# Patient Record
Sex: FEMALE | Race: WHITE | NOT HISPANIC OR LATINO | Employment: OTHER | URBAN - METROPOLITAN AREA
[De-identification: names, ages, dates, MRNs, and addresses within clinical notes are randomized per-mention and may not be internally consistent; named-entity substitution may affect disease eponyms.]

---

## 2017-03-01 ENCOUNTER — MEDICAL CORRESPONDENCE (OUTPATIENT)
Dept: HEALTH INFORMATION MANAGEMENT | Facility: CLINIC | Age: 77
End: 2017-03-01

## 2017-03-01 ENCOUNTER — TRANSFERRED RECORDS (OUTPATIENT)
Dept: HEALTH INFORMATION MANAGEMENT | Facility: CLINIC | Age: 77
End: 2017-03-01

## 2017-03-06 ENCOUNTER — TELEPHONE (OUTPATIENT)
Dept: GASTROENTEROLOGY | Facility: CLINIC | Age: 77
End: 2017-03-06

## 2017-03-06 NOTE — TELEPHONE ENCOUNTER
Referral received from [facility] for patient to see Dr. Saini for C Diff.  One office note from 3/1/17 received with no C Diff test result included.  Office note states that multiple positive tests have been obtained through [facility].  Call placed to [facility] department and requested tests, office notes and any abdominal imaging reports to be faxed to me at [phone].  Will watch for these records.     Carol Westholter

## 2017-03-08 NOTE — TELEPHONE ENCOUNTER
Records received from [facility].  Positive C Diff tests from 4/1/16 and 10/10/16.  Called [facility] back to request imaging reports and office/ER notes in addition.     Carol Westholter

## 2017-03-08 NOTE — TELEPHONE ENCOUNTER
Additional records received.  Contacted patient and scheduled for 5/17/17 at 3:00pm.  Also placed on waitlist.  Records placed in file in specialty clinic.     Carol Westholter

## 2017-12-21 ENCOUNTER — NURSE TRIAGE (OUTPATIENT)
Dept: NURSING | Facility: CLINIC | Age: 77
End: 2017-12-21

## 2017-12-22 NOTE — TELEPHONE ENCOUNTER
"Daughter Farheen calling concerned about Virginia's current \"fever\" and symptoms. She reports \"she started feeling bad on Sunday and had a fever on Monday of 102 F which she has had all week\" and \" she is taking acetaminophen and Nyquil, drinking lots of fluids, is eating, and is urinating regularly.\" Farheen also reports \"her temperature tonight was 103.4 F down to 102.9 F and back up to 103 F\" and asked \"she just took a cold shower and now her temperature is 102.5 F, can she take more acetaminophen?\" Virginia reports she is taking her temperature rectally due to \"drinking lots of fluids\" and denies vomiting or diarrhea. She reports her urine is light in color and is also c/o \"mild sore throat, headache, muscle aches, and mild nasal congestion\" which she reports \"all started on Sunday and have remained the same\" since then. Farheen reports Virginia \"took 650 mg of acetaminophen (in Nyquil) a couple hours ago and also took 1000 mg of acetaminophen an hour ago.\" Farheen stated \"she called her clinic and they got her an appointment tomorrow at noon\". Educated on proper home fever cares and monitoring, appropriate use of OTC fever meds, and potential causes of fever in adults. Care advice given per guideline protocol; advised Farheen to have Virginia seen within 4 hours by a provider; at this time of day, ER only option for care. Farheen and Virginia verbalized understanding of care advice given and plan on waiting until the clinic appointment tomorrow. Encouraged Farheen and Virginia to call [facility] back or seek care right away if her symptoms worsen before her scheduled clinic appointment.     Opal Parker RN  [facility]    Reason for Disposition    [1] Fever > 101 F (38.3 C) AND [2] age > 60    Additional Information    Negative: Shock suspected (e.g., cold/pale/clammy skin, too weak to stand, low BP, rapid pulse)    Negative: Difficult to awaken or acting confused  (e.g., disoriented, slurred speech)    Negative: [1] Difficulty " "breathing AND [2] bluish lips, tongue or face    Negative: New onset rash with multiple purple (or blood-colored) spots or dots    Negative: Sounds like a life-threatening emergency to the triager    Negative: Fever in a cancer patient who is currently is receiving chemotherapy or radiation therapy, or cancer patient who has metastatic or end-stage cancer and is receiving palliative care    Negative: Pregnant    Negative: Fever onset within 24 hours of receiving vaccine    Negative: [1] Fever AND [2] within 21 days of Ebola EXPOSURE    Negative: Other symptom is present, see that guideline  (e.g., symptoms of cough, runny nose, sore throat, earache, abdominal pain, diarrhea, vomiting)    Negative: [1] Headache AND [2] stiff neck (can't touch chin to chest)    Negative: Difficulty breathing    Negative: IV drug abuse    Negative: [1] Drinking very little AND [2] dehydration suspected (e.g., no urine > 12 hours, very dry mouth, very lightheaded)    Negative: Patient sounds very sick or weak to the triager  (Exception: mild weakness and hasn't taken fever medicine)    Negative: Fever > 104 F (40 C)    Answer Assessment - Initial Assessment Questions  1. TEMPERATURE: \"What is the most recent temperature?\"  \"How was it measured?\"       102.5 F rectally  2. ONSET: \"When did the fever start?\"       Monday  3. SYMPTOMS: \"Do you have any other symptoms besides the fever?\"  (e.g., colds, headache, sore throat, earache, cough, rash, diarrhea, vomiting, abdominal pain)      Mild sore throat, mild nasal congestion, muscle aches, headache  4. CAUSE: If there are no symptoms, ask: \"What do you think is causing the fever?\"       dont know  5. CONTACTS: \"Does anyone else in the family have an infection?\"      Didn't report  6. TREATMENT: \"What have you done so far to treat this fever?\" (e.g., medications)      Cold shower and OTC meds  7. IMMUNOCOMPROMISE: \"Do you have of the following: diabetes, HIV positive, splenectomy, cancer " "chemotherapy, chronic steroid treatment, transplant patient, etc.\"      denies  8. PREGNANCY: \"Is there any chance you are pregnant?\" \"When was your last menstrual period?\"      n/a  9. TRAVEL: \"Have you traveled out of the country in the last month?\" (e.g., travel history, exposures)      denies    Protocols used: FEVER-ADULT-AH    "

## 2018-01-13 ENCOUNTER — RECORDS - HEALTHEAST (OUTPATIENT)
Dept: LAB | Facility: CLINIC | Age: 78
End: 2018-01-13

## 2018-01-14 LAB — C DIFF TOX B STL QL: NEGATIVE

## 2018-01-15 ENCOUNTER — RECORDS - HEALTHEAST (OUTPATIENT)
Dept: LAB | Facility: CLINIC | Age: 78
End: 2018-01-15

## 2018-01-15 LAB
ANION GAP SERPL CALCULATED.3IONS-SCNC: 6 MMOL/L (ref 5–18)
BUN SERPL-MCNC: 20 MG/DL (ref 8–28)
CALCIUM SERPL-MCNC: 8 MG/DL (ref 8.5–10.5)
CHLORIDE BLD-SCNC: 110 MMOL/L (ref 98–107)
CO2 SERPL-SCNC: 27 MMOL/L (ref 22–31)
CREAT SERPL-MCNC: 0.77 MG/DL (ref 0.6–1.1)
ERYTHROCYTE [DISTWIDTH] IN BLOOD BY AUTOMATED COUNT: 16.4 % (ref 11–14.5)
GFR SERPL CREATININE-BSD FRML MDRD: >60 ML/MIN/1.73M2
GLUCOSE BLD-MCNC: 70 MG/DL (ref 70–125)
HCT VFR BLD AUTO: 30.9 % (ref 35–47)
HGB BLD-MCNC: 9.6 G/DL (ref 12–16)
HGB BLD-MCNC: 9.7 G/DL (ref 12–16)
MCH RBC QN AUTO: 30.6 PG (ref 27–34)
MCHC RBC AUTO-ENTMCNC: 31.1 G/DL (ref 32–36)
MCV RBC AUTO: 98 FL (ref 80–100)
PLATELET # BLD AUTO: 334 THOU/UL (ref 140–440)
PLATELET # BLD AUTO: 338 THOU/UL (ref 140–440)
PMV BLD AUTO: 10.8 FL (ref 8.5–12.5)
POTASSIUM BLD-SCNC: 4.8 MMOL/L (ref 3.5–5)
RBC # BLD AUTO: 3.14 MILL/UL (ref 3.8–5.4)
SODIUM SERPL-SCNC: 142 MMOL/L (ref 136–145)
SODIUM SERPL-SCNC: 143 MMOL/L (ref 136–145)
WBC: 7.5 THOU/UL (ref 4–11)

## 2018-06-20 ENCOUNTER — APPOINTMENT (OUTPATIENT)
Age: 78
Setting detail: DERMATOLOGY
End: 2018-06-24

## 2018-06-20 DIAGNOSIS — L40.8 OTHER PSORIASIS: ICD-10-CM

## 2018-06-20 PROBLEM — L30.9 DERMATITIS, UNSPECIFIED: Status: ACTIVE | Noted: 2018-06-20

## 2018-06-20 PROCEDURE — OTHER MIPS QUALITY: OTHER

## 2018-06-20 PROCEDURE — 11100: CPT

## 2018-06-20 PROCEDURE — OTHER PRESCRIPTION: OTHER

## 2018-06-20 PROCEDURE — 99202 OFFICE O/P NEW SF 15 MIN: CPT | Mod: 25

## 2018-06-20 PROCEDURE — OTHER COUNSELING: OTHER

## 2018-06-20 PROCEDURE — OTHER BIOPSY BY PUNCH METHOD: OTHER

## 2018-06-20 RX ORDER — TRIAMCINOLONE ACETONIDE 1 MG/G
CREAM TOPICAL BID
Qty: 80 | Refills: 1 | Status: ERX | COMMUNITY
Start: 2018-06-20

## 2018-06-20 ASSESSMENT — LOCATION DETAILED DESCRIPTION DERM: LOCATION DETAILED: LEFT RIB CAGE

## 2018-06-20 ASSESSMENT — LOCATION SIMPLE DESCRIPTION DERM: LOCATION SIMPLE: ABDOMEN

## 2018-06-20 ASSESSMENT — LOCATION ZONE DERM: LOCATION ZONE: TRUNK

## 2018-06-20 NOTE — PROCEDURE: MIPS QUALITY
Quality 431: Preventive Care And Screening: Unhealthy Alcohol Use - Screening: Patient screened for unhealthy alcohol use using a single question and scores less than 2 times per year
Quality 226: Preventive Care And Screening: Tobacco Use: Screening And Cessation Intervention: Patient screened for tobacco and never smoked
Quality 131: Pain Assessment And Follow-Up: Pain assessment documented as positive using a standardized tool AND a follow-up plan is documented
Quality 265: Biopsy Follow-Up: Biopsy results reviewed, communicated, tracked, and documented
Detail Level: Detailed
Quality 110: Preventive Care And Screening: Influenza Immunization: Influenza Immunization Administered during Influenza season
Quality 130: Documentation Of Current Medications In The Medical Record: Current Medications Documented

## 2018-06-20 NOTE — PROCEDURE: BIOPSY BY PUNCH METHOD
Bill 67350 For Specimen Handling/Conveyance To Laboratory?: no
X Size Of Lesion In Cm (Optional): 0
Billing Type: Third-Party Bill
Home Suture Removal Text: Patient was provided a home suture removal kit and will remove their sutures at home.  If they have any questions or difficulties they will call the office.
Was A Bandage Applied: Yes
Detail Level: Detailed
Epidermal Sutures: 5-0 Polysorb
Consent: Written consent was obtained and risks were reviewed including but not limited to scarring, infection, bleeding, scabbing, incomplete removal, nerve damage and allergy to anesthesia.
Hemostasis: Pressure
Punch Size In Mm: 3
Post-Care Instructions: I reviewed with the patient in detail post-care instructions. Patient is to keep the biopsy site dry overnight, and then apply H2O2 and Vaseline daily until healed.
Notification Instructions: Patient will be notified of biopsy results. However, patient instructed to call the office if not contacted within 2 weeks.
Dressing: pressure dressing with telfa
Anesthesia Type: 1% lidocaine with epinephrine and a 1:10 solution of 8.4% sodium bicarbonate
Anesthesia Volume In Cc (Will Not Render If 0): 1.5
Biopsy Type: H and E
Body Location Override (Optional - Billing Will Still Be Based On Selected Body Map Location If Applicable): left inframammary fold
Wound Care: Vaseline

## 2022-08-21 ENCOUNTER — APPOINTMENT (OUTPATIENT)
Dept: MRI IMAGING | Facility: CLINIC | Age: 82
DRG: 068 | End: 2022-08-21
Payer: COMMERCIAL

## 2022-08-21 ENCOUNTER — APPOINTMENT (OUTPATIENT)
Dept: CT IMAGING | Facility: CLINIC | Age: 82
DRG: 068 | End: 2022-08-21
Attending: EMERGENCY MEDICINE
Payer: COMMERCIAL

## 2022-08-21 ENCOUNTER — HOSPITAL ENCOUNTER (INPATIENT)
Facility: CLINIC | Age: 82
LOS: 1 days | Discharge: HOME | DRG: 068 | End: 2022-08-24
Attending: EMERGENCY MEDICINE | Admitting: HOSPITALIST
Payer: COMMERCIAL

## 2022-08-21 DIAGNOSIS — I63.9 CEREBROVASCULAR ACCIDENT (CVA), UNSPECIFIED MECHANISM (H): Primary | ICD-10-CM

## 2022-08-21 DIAGNOSIS — R29.898 WEAKNESS OF LEFT LOWER EXTREMITY: ICD-10-CM

## 2022-08-21 DIAGNOSIS — R20.0 LEFT SIDED NUMBNESS: ICD-10-CM

## 2022-08-21 DIAGNOSIS — G45.9 TIA (TRANSIENT ISCHEMIC ATTACK): ICD-10-CM

## 2022-08-21 LAB
ANION GAP SERPL CALCULATED.3IONS-SCNC: 8 MMOL/L (ref 3–14)
APTT PPP: 30 SECONDS (ref 22–38)
ATRIAL RATE - MUSE: 66 BPM
BASOPHILS # BLD AUTO: 0.1 10E3/UL (ref 0–0.2)
BASOPHILS NFR BLD AUTO: 1 %
BUN SERPL-MCNC: 37 MG/DL (ref 7–30)
CALCIUM SERPL-MCNC: 9.2 MG/DL (ref 8.5–10.1)
CHLORIDE BLD-SCNC: 108 MMOL/L (ref 94–109)
CO2 SERPL-SCNC: 25 MMOL/L (ref 20–32)
CREAT SERPL-MCNC: 0.96 MG/DL (ref 0.52–1.04)
DIASTOLIC BLOOD PRESSURE - MUSE: NORMAL MMHG
EOSINOPHIL # BLD AUTO: 0.3 10E3/UL (ref 0–0.7)
EOSINOPHIL NFR BLD AUTO: 4 %
ERYTHROCYTE [DISTWIDTH] IN BLOOD BY AUTOMATED COUNT: 13.9 % (ref 10–15)
GFR SERPL CREATININE-BSD FRML MDRD: 58 ML/MIN/1.73M2
GLUCOSE BLD-MCNC: 101 MG/DL (ref 70–99)
HCT VFR BLD AUTO: 38.6 % (ref 35–47)
HGB BLD-MCNC: 13.2 G/DL (ref 11.7–15.7)
IMM GRANULOCYTES # BLD: 0 10E3/UL
IMM GRANULOCYTES NFR BLD: 1 %
INR PPP: 1.12 (ref 0.85–1.15)
INTERPRETATION ECG - MUSE: NORMAL
LYMPHOCYTES # BLD AUTO: 1.8 10E3/UL (ref 0.8–5.3)
LYMPHOCYTES NFR BLD AUTO: 28 %
MCH RBC QN AUTO: 31.1 PG (ref 26.5–33)
MCHC RBC AUTO-ENTMCNC: 34.2 G/DL (ref 31.5–36.5)
MCV RBC AUTO: 91 FL (ref 78–100)
MONOCYTES # BLD AUTO: 0.7 10E3/UL (ref 0–1.3)
MONOCYTES NFR BLD AUTO: 10 %
NEUTROPHILS # BLD AUTO: 3.8 10E3/UL (ref 1.6–8.3)
NEUTROPHILS NFR BLD AUTO: 56 %
NRBC # BLD AUTO: 0 10E3/UL
NRBC BLD AUTO-RTO: 0 /100
P AXIS - MUSE: 71 DEGREES
PLATELET # BLD AUTO: 217 10E3/UL (ref 150–450)
POTASSIUM BLD-SCNC: 3.8 MMOL/L (ref 3.4–5.3)
PR INTERVAL - MUSE: 164 MS
QRS DURATION - MUSE: 78 MS
QT - MUSE: 414 MS
QTC - MUSE: 434 MS
R AXIS - MUSE: 55 DEGREES
RBC # BLD AUTO: 4.24 10E6/UL (ref 3.8–5.2)
SODIUM SERPL-SCNC: 141 MMOL/L (ref 133–144)
SYSTOLIC BLOOD PRESSURE - MUSE: NORMAL MMHG
T AXIS - MUSE: 55 DEGREES
TROPONIN I SERPL HS-MCNC: 4 NG/L
VENTRICULAR RATE- MUSE: 66 BPM
WBC # BLD AUTO: 6.6 10E3/UL (ref 4–11)

## 2022-08-21 PROCEDURE — 85730 THROMBOPLASTIN TIME PARTIAL: CPT

## 2022-08-21 PROCEDURE — 85025 COMPLETE CBC W/AUTO DIFF WBC: CPT

## 2022-08-21 PROCEDURE — 83036 HEMOGLOBIN GLYCOSYLATED A1C: CPT | Performed by: INTERNAL MEDICINE

## 2022-08-21 PROCEDURE — 80048 BASIC METABOLIC PNL TOTAL CA: CPT

## 2022-08-21 PROCEDURE — 70498 CT ANGIOGRAPHY NECK: CPT

## 2022-08-21 PROCEDURE — A9585 GADOBUTROL INJECTION: HCPCS | Performed by: EMERGENCY MEDICINE

## 2022-08-21 PROCEDURE — 93005 ELECTROCARDIOGRAM TRACING: CPT

## 2022-08-21 PROCEDURE — 99285 EMERGENCY DEPT VISIT HI MDM: CPT | Mod: 25

## 2022-08-21 PROCEDURE — 70553 MRI BRAIN STEM W/O & W/DYE: CPT

## 2022-08-21 PROCEDURE — 255N000002 HC RX 255 OP 636: Performed by: EMERGENCY MEDICINE

## 2022-08-21 PROCEDURE — G0378 HOSPITAL OBSERVATION PER HR: HCPCS

## 2022-08-21 PROCEDURE — 70450 CT HEAD/BRAIN W/O DYE: CPT

## 2022-08-21 PROCEDURE — 250N000011 HC RX IP 250 OP 636: Performed by: EMERGENCY MEDICINE

## 2022-08-21 PROCEDURE — 84484 ASSAY OF TROPONIN QUANT: CPT

## 2022-08-21 PROCEDURE — 85610 PROTHROMBIN TIME: CPT

## 2022-08-21 PROCEDURE — 250N000009 HC RX 250: Performed by: EMERGENCY MEDICINE

## 2022-08-21 PROCEDURE — 70496 CT ANGIOGRAPHY HEAD: CPT

## 2022-08-21 PROCEDURE — 36415 COLL VENOUS BLD VENIPUNCTURE: CPT

## 2022-08-21 PROCEDURE — 99220 PR INITIAL OBSERVATION CARE,LEVEL III: CPT | Performed by: INTERNAL MEDICINE

## 2022-08-21 RX ORDER — ATORVASTATIN CALCIUM 20 MG/1
20 TABLET, FILM COATED ORAL DAILY
COMMUNITY

## 2022-08-21 RX ORDER — ASPIRIN 81 MG/1
81 TABLET ORAL DAILY
Status: ON HOLD | COMMUNITY
End: 2022-08-24

## 2022-08-21 RX ORDER — GADOBUTROL 604.72 MG/ML
4 INJECTION INTRAVENOUS ONCE
Status: COMPLETED | OUTPATIENT
Start: 2022-08-21 | End: 2022-08-21

## 2022-08-21 RX ORDER — CHOLECALCIFEROL (VITAMIN D3) 50 MCG
1 TABLET ORAL EVERY EVENING
COMMUNITY

## 2022-08-21 RX ORDER — LEVOTHYROXINE SODIUM 50 UG/1
50 TABLET ORAL EVERY EVENING
COMMUNITY

## 2022-08-21 RX ORDER — GABAPENTIN 100 MG/1
300 CAPSULE ORAL AT BEDTIME
COMMUNITY

## 2022-08-21 RX ORDER — MULTIPLE VITAMINS W/ MINERALS TAB 9MG-400MCG
1 TAB ORAL DAILY
COMMUNITY

## 2022-08-21 RX ORDER — IOPAMIDOL 755 MG/ML
120 INJECTION, SOLUTION INTRAVASCULAR ONCE
Status: COMPLETED | OUTPATIENT
Start: 2022-08-21 | End: 2022-08-21

## 2022-08-21 RX ORDER — POLYETHYLENE GLYCOL 400 AND PROPYLENE GLYCOL 4; 3 MG/ML; MG/ML
1 SOLUTION/ DROPS OPHTHALMIC 4 TIMES DAILY PRN
COMMUNITY

## 2022-08-21 RX ADMIN — GADOBUTROL 4 ML: 604.72 INJECTION INTRAVENOUS at 21:58

## 2022-08-21 RX ADMIN — IOPAMIDOL 75 ML: 755 INJECTION, SOLUTION INTRAVENOUS at 19:42

## 2022-08-21 RX ADMIN — SODIUM CHLORIDE 80 ML: 9 INJECTION, SOLUTION INTRAVENOUS at 19:43

## 2022-08-21 ASSESSMENT — ENCOUNTER SYMPTOMS
SHORTNESS OF BREATH: 0
WEAKNESS: 1
DYSURIA: 0
NUMBNESS: 1
FACIAL ASYMMETRY: 0
ABDOMINAL PAIN: 0
ARTHRALGIAS: 1
FEVER: 0
LIGHT-HEADEDNESS: 0
NAUSEA: 0
SPEECH DIFFICULTY: 0
HEADACHES: 0
VOMITING: 0
NECK PAIN: 1
PALPITATIONS: 0

## 2022-08-21 ASSESSMENT — ACTIVITIES OF DAILY LIVING (ADL)
ADLS_ACUITY_SCORE: 35

## 2022-08-21 NOTE — ED TRIAGE NOTES
Pt reporting intermittent loss of sensation in LLE. Pt reports 1 hour ago unable to feel LLE, currently has sensation in this leg. Pt also reporting L sided shoulder and neck pain. Hx of previous stroke.      Triage Assessment     Row Name 08/21/22 3044       Triage Assessment (Adult)    Airway WDL WDL       Respiratory WDL    Respiratory WDL WDL       Skin Circulation/Temperature WDL    Skin Circulation/Temperature WDL WDL       Cardiac WDL    Cardiac WDL WDL       Peripheral/Neurovascular WDL    Peripheral Neurovascular WDL WDL       Cognitive/Neuro/Behavioral WDL    Cognitive/Neuro/Behavioral WDL WDL

## 2022-08-22 ENCOUNTER — APPOINTMENT (OUTPATIENT)
Dept: CT IMAGING | Facility: CLINIC | Age: 82
DRG: 068 | End: 2022-08-22
Payer: COMMERCIAL

## 2022-08-22 ENCOUNTER — APPOINTMENT (OUTPATIENT)
Dept: CARDIOLOGY | Facility: CLINIC | Age: 82
DRG: 068 | End: 2022-08-22
Attending: INTERNAL MEDICINE
Payer: COMMERCIAL

## 2022-08-22 PROBLEM — G45.9 TIA (TRANSIENT ISCHEMIC ATTACK): Status: ACTIVE | Noted: 2022-08-22

## 2022-08-22 PROBLEM — R25.1 EPISODE OF SHAKING: Status: ACTIVE | Noted: 2022-08-22

## 2022-08-22 LAB
ANION GAP SERPL CALCULATED.3IONS-SCNC: 5 MMOL/L (ref 3–14)
BUN SERPL-MCNC: 27 MG/DL (ref 7–30)
CALCIUM SERPL-MCNC: 8.4 MG/DL (ref 8.5–10.1)
CHLORIDE BLD-SCNC: 111 MMOL/L (ref 94–109)
CHOLEST SERPL-MCNC: 139 MG/DL
CO2 SERPL-SCNC: 24 MMOL/L (ref 20–32)
CREAT SERPL-MCNC: 0.9 MG/DL (ref 0.52–1.04)
ERYTHROCYTE [DISTWIDTH] IN BLOOD BY AUTOMATED COUNT: 14 % (ref 10–15)
FASTING STATUS PATIENT QL REPORTED: YES
GFR SERPL CREATININE-BSD FRML MDRD: 63 ML/MIN/1.73M2
GLUCOSE BLD-MCNC: 85 MG/DL (ref 70–99)
GLUCOSE BLDC GLUCOMTR-MCNC: 86 MG/DL (ref 70–99)
GLUCOSE BLDC GLUCOMTR-MCNC: 91 MG/DL (ref 70–99)
GLUCOSE BLDC GLUCOMTR-MCNC: 92 MG/DL (ref 70–99)
GLUCOSE BLDC GLUCOMTR-MCNC: 92 MG/DL (ref 70–99)
HBA1C MFR BLD: 5.3 % (ref 0–5.6)
HCT VFR BLD AUTO: 36.8 % (ref 35–47)
HDLC SERPL-MCNC: 74 MG/DL
HGB BLD-MCNC: 12.2 G/DL (ref 11.7–15.7)
LDLC SERPL CALC-MCNC: 57 MG/DL
LVEF ECHO: NORMAL
MCH RBC QN AUTO: 30.7 PG (ref 26.5–33)
MCHC RBC AUTO-ENTMCNC: 33.2 G/DL (ref 31.5–36.5)
MCV RBC AUTO: 93 FL (ref 78–100)
NONHDLC SERPL-MCNC: 65 MG/DL
PLATELET # BLD AUTO: 210 10E3/UL (ref 150–450)
POTASSIUM BLD-SCNC: 4.1 MMOL/L (ref 3.4–5.3)
RBC # BLD AUTO: 3.98 10E6/UL (ref 3.8–5.2)
SODIUM SERPL-SCNC: 140 MMOL/L (ref 133–144)
TRIGL SERPL-MCNC: 40 MG/DL
WBC # BLD AUTO: 6.1 10E3/UL (ref 4–11)

## 2022-08-22 PROCEDURE — 999N000226 HC STATISTIC SLP IP EVAL DEFER: Performed by: SPEECH-LANGUAGE PATHOLOGIST

## 2022-08-22 PROCEDURE — G0378 HOSPITAL OBSERVATION PER HR: HCPCS

## 2022-08-22 PROCEDURE — 93306 TTE W/DOPPLER COMPLETE: CPT

## 2022-08-22 PROCEDURE — 36415 COLL VENOUS BLD VENIPUNCTURE: CPT | Performed by: INTERNAL MEDICINE

## 2022-08-22 PROCEDURE — 99225 PR SUBSEQUENT OBSERVATION CARE,LEVEL II: CPT | Performed by: HOSPITALIST

## 2022-08-22 PROCEDURE — 83718 ASSAY OF LIPOPROTEIN: CPT | Performed by: INTERNAL MEDICINE

## 2022-08-22 PROCEDURE — 93306 TTE W/DOPPLER COMPLETE: CPT | Mod: 26 | Performed by: INTERNAL MEDICINE

## 2022-08-22 PROCEDURE — 250N000013 HC RX MED GY IP 250 OP 250 PS 637: Performed by: PHYSICIAN ASSISTANT

## 2022-08-22 PROCEDURE — 250N000009 HC RX 250: Performed by: HOSPITALIST

## 2022-08-22 PROCEDURE — 250N000011 HC RX IP 250 OP 636: Performed by: HOSPITALIST

## 2022-08-22 PROCEDURE — 250N000013 HC RX MED GY IP 250 OP 250 PS 637: Performed by: INTERNAL MEDICINE

## 2022-08-22 PROCEDURE — 82962 GLUCOSE BLOOD TEST: CPT

## 2022-08-22 PROCEDURE — 99223 1ST HOSP IP/OBS HIGH 75: CPT | Mod: FS | Performed by: PSYCHIATRY & NEUROLOGY

## 2022-08-22 PROCEDURE — 0042T CT HEAD PERFUSION W CONTRAST: CPT

## 2022-08-22 PROCEDURE — 85027 COMPLETE CBC AUTOMATED: CPT | Performed by: INTERNAL MEDICINE

## 2022-08-22 PROCEDURE — 80048 BASIC METABOLIC PNL TOTAL CA: CPT | Performed by: INTERNAL MEDICINE

## 2022-08-22 RX ORDER — HYDRALAZINE HYDROCHLORIDE 20 MG/ML
10-20 INJECTION INTRAMUSCULAR; INTRAVENOUS
Status: DISCONTINUED | OUTPATIENT
Start: 2022-08-22 | End: 2022-08-24 | Stop reason: HOSPADM

## 2022-08-22 RX ORDER — LEVOTHYROXINE SODIUM 50 UG/1
50 TABLET ORAL EVERY EVENING
Status: DISCONTINUED | OUTPATIENT
Start: 2022-08-22 | End: 2022-08-24 | Stop reason: HOSPADM

## 2022-08-22 RX ORDER — ASPIRIN 81 MG/1
81 TABLET ORAL EVERY EVENING
Status: DISCONTINUED | OUTPATIENT
Start: 2022-08-22 | End: 2022-08-24

## 2022-08-22 RX ORDER — ATORVASTATIN CALCIUM 20 MG/1
20 TABLET, FILM COATED ORAL DAILY
Status: DISCONTINUED | OUTPATIENT
Start: 2022-08-22 | End: 2022-08-24 | Stop reason: HOSPADM

## 2022-08-22 RX ORDER — PROCHLORPERAZINE MALEATE 5 MG
5 TABLET ORAL EVERY 6 HOURS PRN
Status: DISCONTINUED | OUTPATIENT
Start: 2022-08-22 | End: 2022-08-24 | Stop reason: HOSPADM

## 2022-08-22 RX ORDER — ACETAMINOPHEN 325 MG/1
650 TABLET ORAL EVERY 4 HOURS PRN
Status: DISCONTINUED | OUTPATIENT
Start: 2022-08-22 | End: 2022-08-24 | Stop reason: HOSPADM

## 2022-08-22 RX ORDER — LIDOCAINE 40 MG/G
CREAM TOPICAL
Status: DISCONTINUED | OUTPATIENT
Start: 2022-08-22 | End: 2022-08-24 | Stop reason: HOSPADM

## 2022-08-22 RX ORDER — LABETALOL HYDROCHLORIDE 5 MG/ML
10-20 INJECTION, SOLUTION INTRAVENOUS EVERY 10 MIN PRN
Status: DISCONTINUED | OUTPATIENT
Start: 2022-08-22 | End: 2022-08-24 | Stop reason: HOSPADM

## 2022-08-22 RX ORDER — PROCHLORPERAZINE 25 MG
12.5 SUPPOSITORY, RECTAL RECTAL EVERY 12 HOURS PRN
Status: DISCONTINUED | OUTPATIENT
Start: 2022-08-22 | End: 2022-08-24 | Stop reason: HOSPADM

## 2022-08-22 RX ORDER — ONDANSETRON 4 MG/1
4 TABLET, ORALLY DISINTEGRATING ORAL EVERY 6 HOURS PRN
Status: DISCONTINUED | OUTPATIENT
Start: 2022-08-22 | End: 2022-08-24 | Stop reason: HOSPADM

## 2022-08-22 RX ORDER — IOPAMIDOL 755 MG/ML
50 INJECTION, SOLUTION INTRAVASCULAR ONCE
Status: COMPLETED | OUTPATIENT
Start: 2022-08-22 | End: 2022-08-22

## 2022-08-22 RX ORDER — LANOLIN ALCOHOL/MO/W.PET/CERES
3 CREAM (GRAM) TOPICAL
Status: DISCONTINUED | OUTPATIENT
Start: 2022-08-22 | End: 2022-08-24 | Stop reason: HOSPADM

## 2022-08-22 RX ORDER — ONDANSETRON 2 MG/ML
4 INJECTION INTRAMUSCULAR; INTRAVENOUS EVERY 6 HOURS PRN
Status: DISCONTINUED | OUTPATIENT
Start: 2022-08-22 | End: 2022-08-24 | Stop reason: HOSPADM

## 2022-08-22 RX ORDER — GABAPENTIN 300 MG/1
300 CAPSULE ORAL AT BEDTIME
Status: DISCONTINUED | OUTPATIENT
Start: 2022-08-22 | End: 2022-08-24 | Stop reason: HOSPADM

## 2022-08-22 RX ADMIN — MELATONIN TAB 3 MG 3 MG: 3 TAB at 21:22

## 2022-08-22 RX ADMIN — APIXABAN 2.5 MG: 2.5 TABLET, FILM COATED ORAL at 20:44

## 2022-08-22 RX ADMIN — LEVOTHYROXINE SODIUM 50 MCG: 50 TABLET ORAL at 20:44

## 2022-08-22 RX ADMIN — ASPIRIN 81 MG: 81 TABLET, COATED ORAL at 20:43

## 2022-08-22 RX ADMIN — GABAPENTIN 300 MG: 300 CAPSULE ORAL at 21:22

## 2022-08-22 RX ADMIN — SODIUM CHLORIDE 100 ML: 900 INJECTION INTRAVENOUS at 21:31

## 2022-08-22 RX ADMIN — ACETAMINOPHEN 650 MG: 325 TABLET ORAL at 13:29

## 2022-08-22 RX ADMIN — FLUOXETINE 40 MG: 20 CAPSULE ORAL at 20:45

## 2022-08-22 RX ADMIN — ATORVASTATIN CALCIUM 20 MG: 20 TABLET, FILM COATED ORAL at 08:07

## 2022-08-22 RX ADMIN — IOPAMIDOL 50 ML: 755 INJECTION, SOLUTION INTRAVENOUS at 21:31

## 2022-08-22 ASSESSMENT — ACTIVITIES OF DAILY LIVING (ADL)
ADLS_ACUITY_SCORE: 36
ADLS_ACUITY_SCORE: 38
ADLS_ACUITY_SCORE: 36
ADLS_ACUITY_SCORE: 36
ADLS_ACUITY_SCORE: 33
ADLS_ACUITY_SCORE: 38
ADLS_ACUITY_SCORE: 35
ADLS_ACUITY_SCORE: 38
ADLS_ACUITY_SCORE: 36
ADLS_ACUITY_SCORE: 36
DEPENDENT_IADLS:: TRANSPORTATION
ADLS_ACUITY_SCORE: 36
ADLS_ACUITY_SCORE: 38

## 2022-08-22 NOTE — CONSULTS
"    [facility]    Stroke Telephone Note    I was called by Hang Farias on 08/21/22 regarding patient Leesa Jacinto. The patient is a 82 year old female presented with LLE weakness and numbness. Symptom started at 1630 08/21/22 when she notice LLE numbness and it buckled. On her way into hospital she also noticed LUE weakness and numbness. On ED provider exam there is L LUE weakness only. She has a hx of Afib on eliquis    Stroke Code Data (for stroke code without tele)  Stroke code activated 08/21/22 1918   Stroke provider first response  08/21/22 1919 08/21/22 1919   Last known normal 08/21/22   1630        Time of discovery   (or onset of symptoms) 08/21/22 1630   Head CT read by Stroke Neuro Dr/Provider 08/21/22 1945   Was stroke code de-escalated? Yes 08/21/22 1958          Imaging Findings   Ct head neg for acute abnormalities. Show evidence of old infarct in L PRISCA territory  CTA shows R  Distal PRISCA occlusion / stenosis. L PRISCA A2 segment shows stenosis as well.     Intravenous Thrombolysis  Not given due to:   - DOAC dose within 48 hours or INR > 1.7    Endovascular Treatment  Not initiated due to absence of proximal vessel occlusion    Impression  Acute ischemic stroke    Recommendations   - Use orderset: \"Ischemic Stroke Routine Admission\" or \"Ischemic Stroke No Thrombolytics/No Thrombectomy ICU Admission\"  - Neurochecks and Vital Signs every 4 hours   - Permissive HTN; goal SBP < 220 mmHg  - Statin: PTA statin  - MRI Brain with and without contrast  - TTE (with Bubble Study if age 60 yrs or less)  - Telemetry, EKG  - Bedside Glucose Monitoring  - A1c, Lipid Panel, Troponin x 3  - PT/OT/SLP  - Stroke Education  - Euthermia, Euglycemia   - Hold anticoagulation for now. Obtain MRI brain to determine ischemic stroke burden  - Timing of AC based on ischemic stroke burden.     My recommendations are based on the information provided over the phone by [place] " "M Ophelia's in-person providers. They are not intended to replace the clinical judgment of her in-person providers. I was not requested to personally see or examine the patient at this time.    Marie Del Cid MD  Vascular Neurology  To page me or covering stroke neurology team member, click here: AMCOM   Choose \"On Call\" tab at top, then search dropdown box for \"Neurology Adult\", select location, press Enter, then look for stroke/neuro ICU/telestroke.         "

## 2022-08-22 NOTE — PROGRESS NOTES
[facility]    Medicine Progress Note - Hospitalist Service    Date of Admission:  8/21/2022    Assessment & Plan        Ms. Jacinto is an 82-year-old female with a past medical history significant for paroxysmal atrial fibrillation, stroke, hypothyroidism, who presents to the Emergency Department with concerns for left sided arm and leg weakness.    Left-sided arm pain and leg weakness  The patient does have a history of stroke and states her symptoms previously were also on the left side and her symptoms today are very reminiscent of her stroke prior.  CT does show left frontal lobe, age indeterminate infarcts, but she does have an MRI from prior that shows left sided infarcts as well.  This would not necessarily fit with her symptoms today.  Her symptoms now have improved, though she still has some left leg weakness.  - Neurology following, awaiting final recs  - PT/OT awaited  - Await neuro official plan with Apixaban and aspirin - note is pended as of now  - A1c 5.3%  - LDL 57  - echo with normal EF, fairly unremarkable  -  Final dispo plans after neuro stroke completes eval and confirms recs, as well as therapy evaluations    Paroxysmal atrial fibrillation  She is currently in sinus rhythm.  Defer plans for anticoagulation to Neurology.    Depression  Continue with Prozac.    Idiopathic peripheral neuropathy  Continue with Neurontin.    Hypothyroidism  Continue with levothyroxine.        Diet: Combination Diet Regular Diet    DVT Prophylaxis: defer to neuro - previously on apixaban PTA  Limon Catheter: Not present  Central Lines: None  Cardiac Monitoring: ACTIVE order. Indication: Stroke, acute (48 hours)  Code Status: No CPR- Do NOT Intubate      Disposition Plan    pending therapies and neuro recs     The patient's care was discussed with the Bedside Nurse, Care Coordinator/ and Patient.    Michele Santos MD  Hospitalist Service  [facility]  Hospital  Securely message with the Vocera Web Console (learn more here)  Text page via AMCSmashChart Paging/Directory         Clinically Significant Risk Factors Present on Admission               # Coagulation Defect: home medication list includes an anticoagulant medication            ______________________________________________________________________    Interval History   Care assumed today, seen and examined midday. Continued intermittent  L arm pain, circumferential, no chest pain, no new sob.  L leg remains weak.      Data reviewed today: I reviewed all medications, new labs and imaging results over the last 24 hours. I personally reviewed no images or EKG's today.    Physical Exam   Vital Signs: Temp: 97.7  F (36.5  C) Temp src: Oral BP: 132/71 Pulse: 77   Resp: 16 SpO2: 98 % O2 Device: None (Room air)    Weight: 101 lbs 12.8 oz    Gen: NAD, pleasant  HEENT: EOMI, MMM  Resp: no crackles,  no wheezes, no increased work of resp  CV: S1S2 heard, reg rhythm, reg rate  Abdo: soft, nontender, nondistended, bowel sounds present  Ext: calves nontender, well perfused  Neuro: aa, conversing unremarkably, BUE rom and strength fairly sym, L leg weakness noted, sensory grossly intact, CN grossly intact, no facial asymmetry      Data   Recent Labs   Lab 08/22/22  1155 08/22/22  0737 08/22/22  0722 08/22/22  0200 08/21/22  1929   WBC  --   --  6.1  --  6.6   HGB  --   --  12.2  --  13.2   MCV  --   --  93  --  91   PLT  --   --  210  --  217   INR  --   --   --   --  1.12   NA  --   --  140  --  141   POTASSIUM  --   --  4.1  --  3.8   CHLORIDE  --   --  111*  --  108   CO2  --   --  24  --  25   BUN  --   --  27  --  37*   CR  --   --  0.90  --  0.96   ANIONGAP  --   --  5  --  8   DEBI  --   --  8.4*  --  9.2   GLC 92 92 85   < > 101*    < > = values in this interval not displayed.     Recent Results (from the past 24 hour(s))   CT Head w/o Contrast    Narrative    EXAM: CT HEAD W/O CONTRAST  LOCATION: [facility]  [facility]  DATE/TIME: 8/21/2022 7:47 PM    INDICATION: code stroke, confusion, altered mental status, numbness  COMPARISON: None.  TECHNIQUE: Routine CT Head without IV contrast. Multiplanar reformats. Dose reduction techniques were used.    FINDINGS:  INTRACRANIAL CONTENTS: No intracranial hemorrhage, extraaxial collection, or mass effect.  Left frontal lobe white matter demonstrates multiple small patchy age-indeterminate infarcts. Otherwise, no CT evidence of acute infarct. Mild presumed chronic   small vessel ischemic changes. Mild generalized volume loss. No hydrocephalus.     VISUALIZED ORBITS/SINUSES/MASTOIDS: No intraorbital abnormality. No paranasal sinus mucosal disease. Right mastoid cells essentially clear. Left mastoid cells mild to moderate patchy opacification.    BONES/SOFT TISSUES: No acute abnormality. Left greater then right TMJ degenerative changes. Anterior to the left maxillary alveolar ridge is a partially visualized punctate radiopaque foreign body.      Impression    IMPRESSION:  1.  No acute intracranial hemorrhage.  2.  Left frontal lobe white matter demonstrates multiple small patchy age-indeterminate infarcts.   3.  Otherwise, no CT evidence of acute infarct.   4.  Age related changes described above.  5.  Anterior to the left maxillary alveolar ridge is a partially visualized punctate radiopaque foreign body.      Dr Hang Farias was notified by Dr Tor Ramírez at  8:40 PM 08/21/2022.    There was a delay in notifying radiology regarding the code stroke.   CTA Head Neck w Contrast    Narrative    EXAM: CTA HEAD NECK W CONTRAST  LOCATION: [facility]  DATE/TIME: 8/21/2022 7:48 PM    INDICATION: Altered mental status/confusion. Possible stroke. Numbness.  COMPARISON: None.  CONTRAST: 75mL Isovue 370  TECHNIQUE: Head and neck CT angiogram with IV contrast. Axial helical CT images of the head and neck vessels obtained during the arterial phase of  intravenous contrast administration. Axial 2D reconstructed images and multiplanar 3D MIP reconstructed   images of the head and neck vessels were performed by the technologist. Dose reduction techniques were used. All stenosis measurements made according to NASCET criteria unless otherwise specified.    FINDINGS:   HEAD CTA:  Bilateral carotid siphons demonstrate mild stenoses secondary to calcified plaque.    Right M2 segments demonstrate multiple stenoses ranging from mild to severe.    Bilateral A2/A3 segments with history multiple stenoses ranging from mild to severe.    Left M2 segments demonstrate multiple stenoses ranging from mild to severe.    No additional significant stenosis/occlusion.      Right supraclinoid ICA demonstrates a small 2 mm inferior outpouching may reflect infundibulum with poorly visualized apical artery or small aneurysm.     Otherwise, no brain aneurysm. No AVM/AVF.    Standard [place] of [place] anatomy.     Dominant right and smaller left vertebral artery contribute to a normal basilar artery.     DURAL VENOUS SINUSES: Not well evaluated on a technical basis.      NECK CTA:  RIGHT CAROTID: No significant stenosis/occlusion. No dissection.    LEFT CAROTID: No significant stenosis/occlusion. No dissection.    VERTEBRAL ARTERIES:  No significant stenosis/occlusion. No dissection.      Balanced vertebral arteries.    AORTIC ARCH: Classic aortic arch anatomy with no significant stenosis at the origin of the great vessels.    ARTERIAL PLAQUE: Calcified/noncalcified plaque aorta, left subclavian artery, bilateral carotid bifurcations/bulbs, bilateral carotid siphons, bilateral V4 segments.    NONVASCULAR STRUCTURES:   Scattered foci of air within the venous system nonspecific likely iatrogenic. Thyroid nodules largest measuring 0.8 cm. Diffuse bony demineralization. Thoracic spine noted multiple mild compression deformities. Degenerative changes noted within the   spine.        Impression     IMPRESSION:   CTA BRAIN:  1.  No intracranial arterial large vessel occlusion.    2.  Bilateral carotid siphons demonstrate mild stenoses secondary to calcified plaque.    3.  Bilateral M2 segments demonstrate multiple stenoses ranging from mild to severe.    4.  Bilateral A2/A3 segments with history multiple stenoses ranging from mild to severe.    5.  Right supraclinoid ICA demonstrates a small 2 mm inferior outpouching may reflect infundibulum with poorly visualized apical artery or small aneurysm.     CTA NECK:  No significant stenosis, occlusion, dissection.      Dr Hang Farias was notified by Dr Tor Ramírez at  9:00 PM 08/21/2022.   MR Brain w/o & w Contrast    Narrative    EXAM: MR BRAIN W/O and W CONTRAST  LOCATION: [facility]  DATE/TIME: 8/21/2022 10:59 PM    INDICATION: Stroke, negative CT, left leg and arm weakness.  COMPARISON: CT same day.  CONTRAST: 4 mL Gadavist   TECHNIQUE: Routine multiplanar multisequence head MRI without and with intravenous contrast.    FINDINGS:  INTRACRANIAL CONTENTS: No acute or subacute infarct. No mass, acute hemorrhage, or extra-axial fluid collections. Patchy nonspecific T2/FLAIR hyperintensities within the cerebral white matter most consistent with mild to moderate chronic microvascular   ischemic change. Mild to moderate generalized cerebral atrophy. No hydrocephalus. Multiple chronic small white matter infarcts in the left frontal lobe. No pathologic contrast enhancement.    SELLA: No abnormality accounting for technique.    OSSEOUS STRUCTURES/SOFT TISSUES: 10 mm sclerotic focus within the C2 vertebral body. Remainder of the bone marrow pattern is normal. The major intracranial vascular flow voids are maintained.     ORBITS: No abnormality accounting for technique.     SINUSES/MASTOIDS: No paranasal sinus mucosal disease. Scattered fluid/membrane thickening in the left mastoid air cells. No apparent mass in the posterior nasopharynx or  skull base.       Impression    IMPRESSION:  1.  No recent infarct, intracranial mass, abnormal enhancement or evidence of intracranial hemorrhage.  2.  Mild to moderate presumed chronic small vessel ischemic changes and generalized volume loss.  3.  Small chronic white matter infarcts in the left frontal lobe.  4.  Indeterminate 10 mm sclerotic focus within the C2 vertebral body.   Echocardiogram Complete - For age > 60 yrs   Result Value    LVEF  55%    Narrative    [id number]  [id number]  [id number]  [id number]^MIQUEL^AMOS     [facility]  Echocardiography Laboratory  [facility] [id number]     Name: PILAR MANDEL  MRN: [id number]  : 1940  Study Date: 2022 10:28 AM  Age: 82 yrs  Gender: Female  Patient Location: [facility]  Reason For Study: Cerebrovascular Incident  Ordering Physician: AMOS LAFLEUR  Referring Physician: AMOS LAFLEUR  Performed By: Micah Lamb RDCS     BSA: 1.3 m2  Height: 56 in  Weight: 101 lb  HR: 77  BP: 138/56 mmHg  ______________________________________________________________________________  Procedure  Complete Portable Echo Adult.  ______________________________________________________________________________  Interpretation Summary     1. The left ventricle is normal in structure, function and size. The visual  ejection fraction is estimated at 55%.  2. The right ventricle is normal in structure, function and size.  3. No valve disease.     No previous echo for comparison.  ______________________________________________________________________________  Left Ventricle  The left ventricle is normal in structure, function and size. There is normal  left ventricular wall thickness. The visual ejection fraction is estimated at  55%. Grade I or early diastolic dysfunction. Normal left ventricular wall  motion.     Right Ventricle  The right ventricle is normal in structure, function and size.     Atria  The left atrium is mildly dilated.  Right atrial size is normal. There is no  atrial shunt seen.     Mitral Valve  The mitral valve is normal in structure and function.     Tricuspid Valve  There is mild (1+) tricuspid regurgitation. The right ventricular systolic  pressure is approximated at 28.9 mmHg plus the right atrial pressure.     Aortic Valve  There is mild trileaflet aortic sclerosis.     Pulmonic Valve  The pulmonic valve is normal in structure and function.     Vessels  Normal ascending, transverse (arch), and descending aorta. The inferior vena  cava was normal in size with preserved respiratory variability.     Pericardium  There is no pericardial effusion.     Rhythm  Sinus rhythm was noted.  ______________________________________________________________________________  MMode/2D Measurements & Calculations  IVSd: 1.00 cm     LVIDd: 4.1 cm  LVIDs: 2.9 cm  LVPWd: 0.92 cm  FS: 29.9 %  LV mass(C)d: 124.3 grams  LV mass(C)dI: 93.6 grams/m2  Ao root diam: 3.0 cm  LA dimension: 3.1 cm  LA/Ao: 1.0  LVOT diam: 1.8 cm  LVOT area: 2.5 cm2  LA Volume (BP): 57.0 ml  LA Volume Index (BP): 42.9 ml/m2  RWT: 0.45     Doppler Measurements & Calculations  MV E max leeanne: 64.5 cm/sec  MV A max leeanne: 46.8 cm/sec  MV E/A: 1.4  MV dec time: 0.23 sec  PA acc time: 0.11 sec  TR max leeanne: 268.8 cm/sec  TR max P.9 mmHg  E/E' avg: 10.3  Lateral E/e': 8.6  Medial E/e': 12.1     ______________________________________________________________________________  Report approved by: Mini Ortiz 2022 02:26 PM

## 2022-08-22 NOTE — PROGRESS NOTES
PRIMARY DIAGNOSIS: SYNCOPE/TIA  OUTPATIENT/OBSERVATION GOALS TO BE MET BEFORE DISCHARGE:  1. Orthostatic performed: N/A    2. Diagnostic testing complete & at baseline neurologic testing: No    3. Cleared by consultants (if involved): No    4. Interpretation of cardiac rhythm per telemetry tech: NSR    5. Tolerating adequate PO diet and medications: Yes    6. Return to near baseline physical activity or neurologic status: No    Discharge Planner Nurse   Safe discharge environment identified: No  Barriers to discharge: Yes       Entered by: Liza Veras RN 08/22/2022 9:38 AM     Please review provider order for any additional goals.   Nurse to notify provider when observation goals have been met and patient is ready for discharge.

## 2022-08-22 NOTE — PLAN OF CARE
"Orientation/Cognitive: A&Ox4  Observation Goals (Met/ Not Met): Not Met  Mobility Level/Assist Equipment: Assist of 1 w/ gait belt and walker  Fall Risk (Y/N): Y  Behavior Concerns: none  Pain Management: C/o pain in L arm similar to yesterday, given tylenol, resolved  Tele/VS/O2: VSS, NSR on tele  ABNL Lab/BG: WDL  Diet: tolerating a regular diet, passed dysphagia screen  Bowel/Bladder: WDL, small BM today  Skin Concerns: none  Drains/Devices: PIV SL  Tests/Procedures for next shift: tx to [address] for 24 hr EEG, CT scan  Anticipated DC date & active delays: 1-2 days  Patient Stated Goal for Today: get some rest    /63 (BP Location: Right arm)   Pulse 70   Temp 98.1  F (36.7  C) (Oral)   Resp 16   Ht 1.422 m (4' 8\")   Wt 46.2 kg (101 lb 12.8 oz)   SpO2 94%   BMI 22.82 kg/m          "

## 2022-08-22 NOTE — PROGRESS NOTES
PRIMARY DIAGNOSIS: SYNCOPE/TIA  OUTPATIENT/OBSERVATION GOALS TO BE MET BEFORE DISCHARGE:  1. Orthostatic performed: No    2. Diagnostic testing complete & at baseline neurologic testing: No    3. Cleared by consultants (if involved): No    4. Interpretation of cardiac rhythm per telemetry tech: NSR    5. Tolerating adequate PO diet and medications: Yes    6. Return to near baseline physical activity or neurologic status: Yes    Discharge Planner Nurse   Safe discharge environment identified: No  Barriers to discharge: Yes       Entered by: Liza Veras RN 08/22/2022 4:06 PM     Please review provider order for any additional goals.   Nurse to notify provider when observation goals have been met and patient is ready for discharge.

## 2022-08-22 NOTE — PROGRESS NOTES
PRIMARY DIAGNOSIS: SYNCOPE/TIA  OUTPATIENT/OBSERVATION GOALS TO BE MET BEFORE DISCHARGE:  1. Orthostatic performed: N/A    2. Diagnostic testing complete & at baseline neurologic testing: No    3. Cleared by consultants (if involved): No    4. Interpretation of cardiac rhythm per telemetry tech: NSR    5. Tolerating adequate PO diet and medications: Yes    6. Return to near baseline physical activity or neurologic status: Yes    Discharge Planner Nurse   Safe discharge environment identified: No  Barriers to discharge: Yes       Entered by: Liza Veras RN 08/22/2022 2:06 PM     Please review provider order for any additional goals.   Nurse to notify provider when observation goals have been met and patient is ready for discharge.

## 2022-08-22 NOTE — PHARMACY-ADMISSION MEDICATION HISTORY
Pharmacy Medication History  Admission medication history interview status for the 8/21/2022  admission is complete. See EPIC admission navigator for prior to admission medications     Location of Interview: Patient room  Medication history sources: Patient    Significant changes made to the medication list:  All medications were added    In the past week, patient estimated taking medication this percent of the time: greater than 90%    Additional medication history information:   [place] had all of her evening medications today.  She took them prior to leaving for the hospital.    Medication reconciliation completed by provider prior to medication history? No    Time spent in this activity: 20 minutes    Prior to Admission medications    Medication Sig Last Dose Taking? Auth Provider Long Term End Date   apixaban ANTICOAGULANT (ELIQUIS) 2.5 MG tablet Take 2.5 mg by mouth 2 times daily 8/21/2022 at pm Yes Unknown, Entered By History     aspirin 81 MG EC tablet Take 81 mg by mouth daily 8/21/2022 at pm Yes Unknown, Entered By History     atorvastatin (LIPITOR) 20 MG tablet Take 20 mg by mouth daily 8/21/2022 at am Yes Unknown, Entered By History Yes    calcium carbonate 600 mg-vitamin D 400 units (CALTRATE) 600-400 MG-UNIT per tablet Take 1 tablet by mouth 2 times daily 8/21/2022 at pm Yes Unknown, Entered By History     FLUoxetine (PROZAC) 20 MG capsule Take 40 mg by mouth every evening 8/21/2022 at pm Yes Unknown, Entered By History Yes    gabapentin (NEURONTIN) 100 MG capsule Take 300 mg by mouth At Bedtime 8/21/2022 at pm Yes Unknown, Entered By History Yes    levothyroxine (SYNTHROID/LEVOTHROID) 50 MCG tablet Take 50 mcg by mouth every evening 8/21/2022 at pm Yes Unknown, Entered By History Yes    multivitamin w/minerals (THERA-VIT-M) tablet Take 1 tablet by mouth daily 8/21/2022 at Unknown time Yes Unknown, Entered By History     polyethylene glycol-propylene glycol PF (SYSTANE ULTRA PF) 0.4-0.3 % SOLN  opthalmic solution Place 1 drop into both eyes 4 times daily as needed for dry eyes  at prn Yes Unknown, Entered By History     vitamin D3 (CHOLECALCIFEROL) 50 mcg (2000 units) tablet Take 1 tablet by mouth every evening 8/21/2022 at pm Yes Unknown, Entered By History         The information provided in this note is only as accurate as the sources available at the time of update(s)

## 2022-08-22 NOTE — ED NOTES
Ed   [facility]  ED Nurse Handoff Report    ED Chief complaint: Numbness      ED Diagnosis: numbness weakness, R/o stroke  Final diagnoses:   Weakness of left lower extremity   Left sided numbness       Code Status: discuss with admit md    Allergies:   Allergies   Allergen Reactions     Sodium Hypochlorite Shortness Of Breath     Alendronic Acid Diarrhea     Raloxifene Other (See Comments)     RIND on 11/8/2019     Sulfa Drugs Rash     Sulfasalazine Rash       Patient Story: Pt has Pt having new L weakness On L arm L leg.  uneven. Pt speech clear. DONI 3. No drift, no droop. GCS 15. Denies Cp no blurred vision, no dizziness. 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  Focused Assessment:  GCS 15 no headache dizziness or blurred vision. Doni L arm L leg weaker. No drift/ droop. Speech clear.  NO cp on eloquis for afib.  abdo soft non tender no NVD. Has digestive issues for 30 years and gets gas pain if npo.  Walker to ambulate. Independent.    Treatments and/or interventions provided: cta neg stroke, at MRI  Results for orders placed or performed during the hospital encounter of 08/21/22   CTA Head Neck w Contrast     Status: None    Narrative    EXAM: CTA HEAD NECK W CONTRAST  LOCATION: [facility]  DATE/TIME: 8/21/2022 7:48 PM    INDICATION: Altered mental status/confusion. Possible stroke. Numbness.  COMPARISON: None.  CONTRAST: 75mL Isovue 370  TECHNIQUE: Head and neck CT angiogram with IV contrast. Axial helical CT images of the head and neck vessels obtained during the arterial phase of intravenous contrast administration. Axial 2D reconstructed images and multiplanar 3D MIP reconstructed   images of the head and neck vessels were performed by the technologist. Dose reduction techniques were used. All stenosis measurements made according to NASCET criteria unless otherwise specified.    FINDINGS:   HEAD CTA:  Bilateral carotid siphons demonstrate mild  stenoses secondary to calcified plaque.    Right M2 segments demonstrate multiple stenoses ranging from mild to severe.    Bilateral A2/A3 segments with history multiple stenoses ranging from mild to severe.    Left M2 segments demonstrate multiple stenoses ranging from mild to severe.    No additional significant stenosis/occlusion.      Right supraclinoid ICA demonstrates a small 2 mm inferior outpouching may reflect infundibulum with poorly visualized apical artery or small aneurysm.     Otherwise, no brain aneurysm. No AVM/AVF.    Standard [place] anatomy.     Dominant right and smaller left vertebral artery contribute to a normal basilar artery.     DURAL VENOUS SINUSES: Not well evaluated on a technical basis.      NECK CTA:  RIGHT CAROTID: No significant stenosis/occlusion. No dissection.    LEFT CAROTID: No significant stenosis/occlusion. No dissection.    VERTEBRAL ARTERIES:  No significant stenosis/occlusion. No dissection.      Balanced vertebral arteries.    AORTIC ARCH: Classic aortic arch anatomy with no significant stenosis at the origin of the great vessels.    ARTERIAL PLAQUE: Calcified/noncalcified plaque aorta, left subclavian artery, bilateral carotid bifurcations/bulbs, bilateral carotid siphons, bilateral V4 segments.    NONVASCULAR STRUCTURES:   Scattered foci of air within the venous system nonspecific likely iatrogenic. Thyroid nodules largest measuring 0.8 cm. Diffuse bony demineralization. Thoracic spine noted multiple mild compression deformities. Degenerative changes noted within the   spine.        Impression    IMPRESSION:   CTA BRAIN:  1.  No intracranial arterial large vessel occlusion.    2.  Bilateral carotid siphons demonstrate mild stenoses secondary to calcified plaque.    3.  Bilateral M2 segments demonstrate multiple stenoses ranging from mild to severe.    4.  Bilateral A2/A3 segments with history multiple stenoses ranging from mild to severe.    5.  Right  supraclinoid ICA demonstrates a small 2 mm inferior outpouching may reflect infundibulum with poorly visualized apical artery or small aneurysm.     CTA NECK:  No significant stenosis, occlusion, dissection.      Dr Hang Farias was notified by Dr Tor Ramírez at  9:00 PM 08/21/2022.   CT Head w/o Contrast     Status: None    Narrative    EXAM: CT HEAD W/O CONTRAST  LOCATION: [facility]  DATE/TIME: 8/21/2022 7:47 PM    INDICATION: code stroke, confusion, altered mental status, numbness  COMPARISON: None.  TECHNIQUE: Routine CT Head without IV contrast. Multiplanar reformats. Dose reduction techniques were used.    FINDINGS:  INTRACRANIAL CONTENTS: No intracranial hemorrhage, extraaxial collection, or mass effect.  Left frontal lobe white matter demonstrates multiple small patchy age-indeterminate infarcts. Otherwise, no CT evidence of acute infarct. Mild presumed chronic   small vessel ischemic changes. Mild generalized volume loss. No hydrocephalus.     VISUALIZED ORBITS/SINUSES/MASTOIDS: No intraorbital abnormality. No paranasal sinus mucosal disease. Right mastoid cells essentially clear. Left mastoid cells mild to moderate patchy opacification.    BONES/SOFT TISSUES: No acute abnormality. Left greater then right TMJ degenerative changes. Anterior to the left maxillary alveolar ridge is a partially visualized punctate radiopaque foreign body.      Impression    IMPRESSION:  1.  No acute intracranial hemorrhage.  2.  Left frontal lobe white matter demonstrates multiple small patchy age-indeterminate infarcts.   3.  Otherwise, no CT evidence of acute infarct.   4.  Age related changes described above.  5.  Anterior to the left maxillary alveolar ridge is a partially visualized punctate radiopaque foreign body.      Dr Hang Farias was notified by Dr Tor Ramírez at  8:40 PM 08/21/2022.    There was a delay in notifying radiology regarding the code stroke.   PTT     Status: Normal    Result Value Ref Range    aPTT 30 22 - 38 Seconds   INR     Status: Normal   Result Value Ref Range    INR 1.12 0.85 - 1.15   Basic metabolic panel     Status: Abnormal   Result Value Ref Range    Sodium 141 133 - 144 mmol/L    Potassium 3.8 3.4 - 5.3 mmol/L    Chloride 108 94 - 109 mmol/L    Carbon Dioxide (CO2) 25 20 - 32 mmol/L    Anion Gap 8 3 - 14 mmol/L    Urea Nitrogen 37 (H) 7 - 30 mg/dL    Creatinine 0.96 0.52 - 1.04 mg/dL    Calcium 9.2 8.5 - 10.1 mg/dL    Glucose 101 (H) 70 - 99 mg/dL    GFR Estimate 58 (L) >60 mL/min/1.73m2   Troponin I (now)     Status: Normal   Result Value Ref Range    Troponin I High Sensitivity 4 <54 ng/L   CBC with platelets and differential     Status: None   Result Value Ref Range    WBC Count 6.6 4.0 - 11.0 10e3/uL    RBC Count 4.24 3.80 - 5.20 10e6/uL    Hemoglobin 13.2 11.7 - 15.7 g/dL    Hematocrit 38.6 35.0 - 47.0 %    MCV 91 78 - 100 fL    MCH 31.1 26.5 - 33.0 pg    MCHC 34.2 31.5 - 36.5 g/dL    RDW 13.9 10.0 - 15.0 %    Platelet Count 217 150 - 450 10e3/uL    % Neutrophils 56 %    % Lymphocytes 28 %    % Monocytes 10 %    % Eosinophils 4 %    % Basophils 1 %    % Immature Granulocytes 1 %    NRBCs per 100 WBC 0 <1 /100    Absolute Neutrophils 3.8 1.6 - 8.3 10e3/uL    Absolute Lymphocytes 1.8 0.8 - 5.3 10e3/uL    Absolute Monocytes 0.7 0.0 - 1.3 10e3/uL    Absolute Eosinophils 0.3 0.0 - 0.7 10e3/uL    Absolute Basophils 0.1 0.0 - 0.2 10e3/uL    Absolute Immature Granulocytes 0.0 <=0.4 10e3/uL    Absolute NRBCs 0.0 10e3/uL   EKG 12 lead     Status: None   Result Value Ref Range    Systolic Blood Pressure  mmHg    Diastolic Blood Pressure  mmHg    Ventricular Rate 66 BPM    Atrial Rate 66 BPM    MD Interval 164 ms    QRS Duration 78 ms     ms    QTc 434 ms    P Axis 71 degrees    R AXIS 55 degrees    T Axis 55 degrees    Interpretation ECG       Sinus rhythm  Nonspecific ST abnormality  Abnormal ECG  No previous ECGs available  Confirmed by GENERATED REPORT, COMPUTER  "([id number]),  Keshia Heath ([id number]) on 8/21/2022 8:09:24 PM     CBC with platelets + differential     Status: None    Narrative    The following orders were created for panel order CBC with platelets + differential.  Procedure                               Abnormality         Status                     ---------                               -----------         ------                     CBC with platelets and d...[[id number]]                      Final result                 Please view results for these tests on the individual orders.     Patient's response to treatments and/or interventions: Monitoring stroke assessment.     To be done/followed up on inpatient unit:  stroke/ NVS assessment    Does this patient have any cognitive concerns?: none    Activity level - Baseline/Home:  Independent  Activity Level - Current:   Walker    Patient's Preferred language: English   Needed?: No    Isolation: None  Infection: Not Applicable  Patient tested for COVID 19 prior to admission: YES  Bariatric?: No    Vital Signs:   Vitals:    08/21/22 1853   BP: (!) 162/59   Pulse: 86   Resp: 18   Temp: 97.6  F (36.4  C)   TempSrc: Oral   SpO2: 99%   Weight: 45.8 kg (101 lb)   Height: 1.422 m (4' 8\")       Cardiac Rhythm:     Was the PSS-3 completed:   Yes  What interventions are required if any?  none             Family Comments: none  OBS brochure/video discussed/provided to patient/family: Yes              Name of person given brochure if not patient: nonke              Relationship to patient: none    For the majority of the shift this patient's behavior was Green.   Behavioral interventions performed were none.    ED NURSE PHONE NUMBER: *[phone]       "

## 2022-08-22 NOTE — CONSULTS
Care Management Initial Consult    General Information  Assessment completed with: Patient, Quiana  Type of CM/SW Visit: Initial Assessment    Primary Care Provider verified and updated as needed: Yes   Readmission within the last 30 days: no previous admission in last 30 days      Reason for Consult: discharge planning  Advance Care Planning:            Communication Assessment  Patient's communication style: spoken language (English or Bilingual)    Hearing Difficulty or Deaf: yes        Cognitive  Cognitive/Neuro/Behavioral: WDL  Level of Consciousness: alert  Arousal Level: opens eyes spontaneously  Orientation: oriented x 4  Mood/Behavior: calm, cooperative  Best Language: 0 - No aphasia  Speech: clear, spontaneous, logical    Living Environment:   People in home: facility resident     Current living Arrangements: independent living facility ([facility])      Able to return to prior arrangements: yes       Family/Social Support:  Care provided by: self  Provides care for: no one  Marital Status: Single             Description of Support System:           Current Resources:   Patient receiving home care services: No     Community Resources: Senior Exercise (for balance)  Equipment currently used at home: cane, straight, walker, standard  Supplies currently used at home: None    Employment/Financial:  Employment Status: retired        Financial Concerns: No concerns identified           Lifestyle & Psychosocial Needs:  Social Determinants of Health     Tobacco Use: Not on file   Alcohol Use: Not on file   Financial Resource Strain: Not on file   Food Insecurity: Not on file   Transportation Needs: Not on file   Physical Activity: Not on file   Stress: Not on file   Social Connections: Not on file   Intimate Partner Violence: Not on file   Depression: Not on file   Housing Stability: Not on file       Functional Status:  Prior to admission patient needed assistance:   Dependent ADLs:: Independent  Dependent  IADLs:: Transportation       Mental Health Status:  Mental Health Status: No Current Concerns       Chemical Dependency Status:                Values/Beliefs:  Spiritual, Cultural Beliefs, Caodaism Practices, Values that affect care: no               Additional Information:  Met with patient. She has had [facility] previous if needed. Will follow for discharge planning.    Mamie Bedolla RN

## 2022-08-22 NOTE — H&P
[facility]    History and Physical - Hospitalist Service       Date of Admission:  8/21/2022  Dictation #: [id number]  Brief Summary (see dictation for more details): 82F hx parox afib, previous CVA, hypothyroid who presents with left leg and arm weakness and concern for recurrent stroke.  Head CT shows patchy left frontal age indetermanent infarcts but also noted previous MRI with left sided infarcts.  Symptoms improved though leg still weak with raising off bed.  MRI brain pending.  Admit for further neuro work up via stroke pathway.      Clinically Significant Risk Factors Present on Admission               # Coagulation Defect: home medication list includes an anticoagulant medication              Michele Vargas, DO  Hospitalist Service  [facility]  Securely message with the Vocera Web Console (learn more here)  Text page via [employer] Paging/Directory

## 2022-08-22 NOTE — ED PROVIDER NOTES
History   Chief Complaint:  Left-sided weakness and numbness    HPI   Leesa Jacinto is a 82 year old female with history of atrial fibrillation on Eliquis, hyperlipidemia , CVA who presents to the emergency department for evaluation of unilateral weakness and numbness.  Patient states that at 4:30 PM, she noticed her left leg suddenly felt weak and buckled from under her.  She did not fall.  She complains of some numbness and tingling in that leg at the time which has resolved.  She does state the left leg still feels weak compared to the right.  On her way here, daughter noted that the patient complained of left upper extremity heaviness and neck discomfort.  The patient states she has had a stroke in the past and is concerned she is having another one.  She denies any confusion, headache, visual disturbance, speech changes, facial droop.  Patient states she is compliant with her Eliquis.    Review of Systems   Constitutional: Negative for fever.   HENT: Negative for congestion.    Respiratory: Negative for shortness of breath.    Cardiovascular: Negative for chest pain and palpitations.   Gastrointestinal: Negative for abdominal pain, nausea and vomiting.   Genitourinary: Negative for dysuria.   Musculoskeletal: Positive for arthralgias and neck pain.   Neurological: Positive for weakness and numbness. Negative for syncope, facial asymmetry, speech difficulty, light-headedness and headaches.   All other systems reviewed and are negative.    Allergies:  Sodium Hypochlorite  Alendronic Acid  Raloxifene  Sulfa Drugs  Sulfasalazine    Medications:  Eliquis  Gabapentin  Aspirin  Atorvastatin  Prozac  Synthroid    Past Medical History:     Atrial fibrillation  Depression  Osteoporosis   Hyperlipidemia  Hypothyroidism    Past Surgical History:    Cholecystectomy  Cataract surgery  JG tube  Colonoscopy    Family History:    Mother-dementia    Social History:  Presents to the ED with her daughter  Lives  "alone    Physical Exam     Patient Vitals for the past 24 hrs:   BP Temp Temp src Pulse Resp SpO2 Height Weight   08/21/22 1853 (!) 162/59 97.6  F (36.4  C) Oral 86 18 99 % 1.422 m (4' 8\") 45.8 kg (101 lb)       Physical Exam  General: Pleasant elderly female sitting up on [facility] with daughter at bedside.  HENT: Patient wearing face mask. When taken off, mucous membranes appear moist.  Eyes: Conjunctive and sclera clear.  PERRLA.  EOMs intact.  CV: Regular rate and rhythm. Normal S1, S2. No appreciable murmurs, gallops or rubs.  Resp: Lungs clear to auscultation bilaterally. Normal respiratory effort. Speaks in full sentences. No stridor or cough observed.  GI: Abdomen soft, non distended and nontender. No rebound or guarding.  MSK: No lower extremity edema.  Skin: Warm and dry.  No rashes.  Neuro: Awake, alert, oriented x3.  GCS 15.  Cranial nerves 2 through 12 intact.  No facial asymmetry.  Normal and fluid speech.  Normal and symmetric finger .  No drift.  Left leg considerably more weak than the right leg on straight leg raise.  Sensation intact in all extremities.  Psych: Cooperative. Normal affect.      Emergency Department Course   ECG  ECG results from 08/21/22   EKG 12 lead     Value    Systolic Blood Pressure     Diastolic Blood Pressure     Ventricular Rate 66    Atrial Rate 66    DC Interval 164    QRS Duration 78        QTc 434    P Axis 71    R AXIS 55    T Axis 55    Interpretation ECG      Sinus rhythm  Nonspecific ST abnormality  Abnormal ECG  No previous ECGs available  Confirmed by GENERATED REPORT, COMPUTER ([id number]),  Keshia Heath ([id number]) on 8/21/2022 8:09:24 PM         Imaging:  CTA Head Neck w Contrast    (Results Pending)   CT Head w/o Contrast    (Results Pending)   MR Brain w/o & w Contrast    (Results Pending)     Report per radiology    Laboratory:  Labs Ordered and Resulted from Time of ED Arrival to Time of ED Departure   BASIC METABOLIC PANEL - Abnormal       Result " Value    Sodium 141      Potassium 3.8      Chloride 108      Carbon Dioxide (CO2) 25      Anion Gap 8      Urea Nitrogen 37 (*)     Creatinine 0.96      Calcium 9.2      Glucose 101 (*)     GFR Estimate 58 (*)    PARTIAL THROMBOPLASTIN TIME - Normal    aPTT 30     INR - Normal    INR 1.12     TROPONIN I - Normal    Troponin I High Sensitivity 4     CBC WITH PLATELETS AND DIFFERENTIAL    WBC Count 6.6      RBC Count 4.24      Hemoglobin 13.2      Hematocrit 38.6      MCV 91      MCH 31.1      MCHC 34.2      RDW 13.9      Platelet Count 217      % Neutrophils 56      % Lymphocytes 28      % Monocytes 10      % Eosinophils 4      % Basophils 1      % Immature Granulocytes 1      NRBCs per 100 WBC 0      Absolute Neutrophils 3.8      Absolute Lymphocytes 1.8      Absolute Monocytes 0.7      Absolute Eosinophils 0.3      Absolute Basophils 0.1      Absolute Immature Granulocytes 0.0      Absolute NRBCs 0.0          Emergency Department Course:  Reviewed:  I reviewed nursing notes, vitals and past medical history    Assessments:  1912    I obtained history and examined the patient as noted above.     1916   I alerted Dr. Farias and called a Tier 1 code stroke.    2010  I rechecked the patient and explained findings.     Consults:  1920  I spoke to Dr. Del Cid in Stroke Neurology.    1959 Dr. Farias spoke to Stroke Neuro. Stroke code was de-escalated. Patient will need MRI imaging and admission for stroke rule out.    2116 I spoke with the hospitalist service Dr. Vargas.    Interventions:  Medications   Saline (80 mLs Intravenous Given 8/21/22 1943)   iopamidol (ISOVUE-370) solution 120 mL (75 mLs Intravenous Given 8/21/22 1942)       Disposition:  The patient was admitted to the hospital under the care of Dr. Vargas    Impression & Plan     Medical Decision Making:  Virginia is a pleasant 82-year-old female with a history of hyperlipidemia, A. fib on Eliquis and prior CVA who presented to the emergency department  this afternoon in the care of her daughter for evaluation of sudden onset left-sided weakness and numbness and tingling at 4:30 PM per detailed HPI above.  Immediately on examination a tier 1 code stroke was called.  CT CTA imaging was obtained per above.  Stroke neurology then de-escalated the stroke and advised us to get an MRI and admit the patient for further consultation.  They did mention some narrowing in the anterior communicating artery that looked suspicious.  MRI was pending at the time of admission.  Additionally, ECG and lab work out was obtained.  ECG showed normal sinus rhythm and troponin was not elevated.  CBC showed no leukocytosis concerning for infection.  Also, no anemia.  Coag studies were normal.  Metabolic panel showed no hypoglycemia or electrolyte derangement.  The patient and her daughter were comfortable with the plan for admission and further evaluation by stroke neurology.  I spoke to the hospitalist Dr. Vargas about the patient's case and he excepted her for observation admission and stroke rule out.    Diagnosis:    ICD-10-CM    1. Weakness of left lower extremity  R29.898    2. Left sided numbness  R20.0          Felicia CALHOUN APC-T  I saw and evaluated this patient under attending provider Dr. Farias.             Felicia Bravo PA-C  08/21/22 3957

## 2022-08-22 NOTE — ED NOTES
Pt passed dysphagia screen, MD ok with her eating. No coughing post,  Awaits MRI. No new stroke deficits. Speech clear

## 2022-08-22 NOTE — CONSULTS
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     She presented to [facility] ED 8/21/22 with L arm and leg weakness and numbness beginning that day. Improved to LUE weakness only in ED. /59. CT/CTA showed old L frontal infarcts and multiple areas of intracranial atherosclerotic stenoses mild to severe (specifically b/l MCA and PRISCA). Her Anticoagulation was held pending MRI for evaluation of stroke size and she was admitted for stroke work-up.     MRI was negative for acute stroke, showed indeterminate 10 mm sclerotic focus C2 vertebral body. She is still having symptoms today although improved. On exam there is significant weakness to LLE, she says L leg has been \"buckling\" for about a week. There is no sensory change anymore and no speech change. She has slight preference to R arm with arm roll. No recent injury or associated pain reported this morning on first visit.     Later in the day I received a page from RN stating that patient had recurrent sharp pain to her L arm and some worsening of the weakness. It resolved after a few minutes and patient reported it was \"the same as yesterday\". She was examined at bedside by this provider and attending Dr. Rojo.  We had her get up to stand and as she stood up she had recurrent event of left lower extremity \"buckling\" but shaking. Lasted for few seconds and then resolved.   No associated pain and pain to her left upper extremity remained resolved. This was felt " suspicious for limb shaking TIA possibly due to hypoperfusion in setting of severe intracranial stenosis.  Discussed other alternative etiologies and plan as described below.      TIA Evaluation Summarized    MRI and/or Head CT  CT perfusion: Pending  MRI: negative for acute stroke, showed indeterminate 10 mm sclerotic focus C2 vertebral body, mild chronic SVID, chronic L frontal lobe infarcts   CTH: L frontal lobe multiple patchy age-indeterminate infarcts, L maxillary alveolar ridge partially visualized punctate radiopaque foreign body   Intracranial Vasculature CTA brain: b/l carotid siphons mild stenosis from calcified plaque, b/l M2 multiple stenoses ranging from mild to severe, b/l A2/3 multiple stenosises mild to severe, R supraclinoid ICA 2 mm infundibulum versus small aneurysm   Cervical Vasculature CTA neck: unremarkable     Echocardiogram TTE: EF 55%. Grade I or early diastolic dysfunction. no wma,LA mildly dilated. Right atrial normal. no  atrial shunt seen, SR   EKG/Telemetry SR, nonspecific ST abnormality   Other Testing  cEEG: Pending     LDL 8/22/22: 57   A1C 8/21/2022: 5.3 %       ABCD2 Patients Score   Age ? 60 years 1 point 1   Blood Pressure     -SBP ? 140 or DBP ?  90    1 point 1   Clinical Features    -Unilateral weakness   -Speech disturbance w/o weakness    -Other    2 points  1 point    0 points 2   Duration of symptoms    ?60 minutes    10-59 minutes    <10 minutes   2 points  1 point  0 points 2   Diabetes  1 point 0   Patient s ABCD2 Score (0-7) = 6         Impression  L arm/leg weakness/numbness/shaking, persistent although improved (denies history of Left sided weakness prior to last week), recurrent LLE buckling/shaking, has significant intracranial atherosclerosis which includes b/l MCA/PRISCA mild to severe stenoses, however also has atrial fibrillation but therapeutic anticoagulation with Eliquis, possible MRI negative stroke or Limb shaking TIA (transient ischemic attack), ABCD2  score 6 versus possible focal seizure versus other movement disorder    L arm recurrent pain of unclear etiology reported as far back as 2019, history of painful idiopathic peripheral neuropathy    Indeterminate 10 mm sclerotic focus C2 vertebral body    B/l carotid siphons mild stenosis from calcified plaque    R supraclinoid ICA 2 mm infundibulum versus small aneurysm    Recommendations  -Discussed with vascular neurology attending, Dr. Rojo  -Neuro checks and vitals every 4 hours  -Recommend work-up for left arm recurrent pain reported from painful peripheral neuropathy, consider vascular imaging to rule out alternative etiology  - CT perfusion ordered and pending  - Recommend orthostatic blood pressures  - needs continuous EEG x 24 hours, should transfer to the 7th floor if bed available as unable to be performed on 5th floor, once EEG on she should be challenged with physical activity/PT to see if it can provoke her left lower extremity shaking to rule out seizures as etiology  - BP goal 120-180, avoid hypotension, if recurrent symptoms then she should lay flat and check blood pressure  -PTA Eliquis held on admission, takes 2.5 mg BID due to age and weight, okay to restart given MRI negative for stroke, continue PTA ASA 81 mg daily given intracranial atherosclerosis  -LDL 57, continue PTA Lipitor 20 mg daily, follow-up with PCP for titration to goal LDL 40-70, <40 increases risk of Intracranial hemorrhage  - Okay to continue gabapentin for her peripheral neuropathy  -Blood glucose monitoring, Hgb A1c 5.3%, (goal <7% for secondary stroke prevention), follow-up with PCP  -Mediterranean diet can be beneficial for overall decreased cardiovascular risk, please print hand out for patient at discharge  -telemetry, no need for heart monitor at discharge  -PT/OT/SPT   -Smoking screen: Never  -Sleep Apnea screen: Denies snoring or excessive daytime sleepiness  -Euthermia, euglycemia, eunatremia   -Stroke  "Education  -Stroke Class per [facility] ([facility])    Patient Follow-up    -Final recommendation pending work-up  -f/u with PCP in 1-2 weeks  -f/u with neurosurgery regarding small 2mm infundibulum v aneurysm to determine need for ongoing monitoring (ordered)    Thank you for this consult. We will continue to follow.     Allyssa Peralta PA-C  Vascular Neurology  To page me or covering stroke neurology team member, click here: [facility]   Choose \"On Call\" tab at top, then search dropdown box for \"Neurology Adult\", select location, press Enter, then look for stroke/neuro ICU/telestroke.    _____________________________________________________    Clinically Significant Risk Factors Present on Admission              # Coagulation Defect: home medication list includes an anticoagulant medication        Past Medical History   No past medical history on file.  Past Surgical History   No past surgical history on file.  Medications   Home Meds  Prior to Admission medications    Medication Sig Start Date End Date Taking? Authorizing Provider   apixaban ANTICOAGULANT (ELIQUIS) 2.5 MG tablet Take 2.5 mg by mouth 2 times daily   Yes Unknown, Entered By History   aspirin 81 MG EC tablet Take 81 mg by mouth daily   Yes Unknown, Entered By History   atorvastatin (LIPITOR) 20 MG tablet Take 20 mg by mouth daily   Yes Unknown, Entered By History   calcium carbonate 600 mg-vitamin D 400 units (CALTRATE) 600-400 MG-UNIT per tablet Take 1 tablet by mouth 2 times daily   Yes Unknown, Entered By History   FLUoxetine (PROZAC) 20 MG capsule Take 40 mg by mouth every evening   Yes Unknown, Entered By History   gabapentin (NEURONTIN) 100 MG capsule Take 300 mg by mouth At Bedtime   Yes Unknown, Entered By History   levothyroxine (SYNTHROID/LEVOTHROID) 50 MCG tablet Take 50 mcg by mouth every evening   Yes Unknown, Entered By History   multivitamin w/minerals (THERA-VIT-M) tablet Take 1 tablet by mouth daily   Yes Unknown, Entered By " History   polyethylene glycol-propylene glycol PF (SYSTANE ULTRA PF) 0.4-0.3 % SOLN opthalmic solution Place 1 drop into both eyes 4 times daily as needed for dry eyes   Yes Unknown, Entered By History   vitamin D3 (CHOLECALCIFEROL) 50 mcg (2000 units) tablet Take 1 tablet by mouth every evening   Yes Unknown, Entered By History       Scheduled Meds    aspirin  81 mg Oral QPM     atorvastatin  20 mg Oral Daily     FLUoxetine  40 mg Oral QPM     gabapentin  300 mg Oral At Bedtime     levothyroxine  50 mcg Oral QPM     sodium chloride (PF)  3 mL Intracatheter Q8H       Infusion Meds    - MEDICATION INSTRUCTIONS -       - MEDICATION INSTRUCTIONS -       - MEDICATION INSTRUCTIONS -         PRN Meds  acetaminophen, labetalol **OR** hydrALAZINE, lidocaine 4%, lidocaine (buffered or not buffered), - MEDICATION INSTRUCTIONS -, - MEDICATION INSTRUCTIONS -, melatonin, ondansetron **OR** ondansetron, - MEDICATION INSTRUCTIONS -, polyethylene glycol-propylene glycol PF, prochlorperazine **OR** prochlorperazine **OR** prochlorperazine, sodium chloride (PF)    Allergies   Allergies   Allergen Reactions     Sodium Hypochlorite Shortness Of Breath     Alendronic Acid Diarrhea     Raloxifene Other (See Comments)     RIND on 11/8/2019     Sulfa Drugs Rash     Sulfasalazine Rash     Family History   No family history on file.  Social History        Review of Systems   The 10 point Review of Systems is negative other than noted in the HPI or here.        PHYSICAL EXAMINATION   Temp:  [97.6  F (36.4  C)-98.1  F (36.7  C)] 97.6  F (36.4  C)  Pulse:  [62-86] 68  Resp:  [10-25] 16  BP: (103-162)/() 103/56  SpO2:  [96 %-99 %] 98 %    General Exam  General:  patient lying in bed without any acute distress    HEENT:  normocephalic/atraumatic  Pulmonary:  no respiratory distress    Neuro Exam  Mental Status:  alert, oriented x 3, follows commands, speech clear and fluent, naming and repetition normal  Cranial Nerves:  visual fields  intact, PERRL, EOMI with normal smooth pursuit, facial sensation intact and symmetric, facial movements symmetric, hearing not formally tested but intact to conversation, no dysarthria, tongue protrusion midline  Motor:  no drift to R arm or leg, there is slight preference to R arm with arm roll, symmetric finger tapping, and slight decrease in  strength on L, L leg with significant weakness nearly hitting bed within 5 seconds, no associated pain or injury  Reflexes:  toes down-going  Sensory:  light touch sensation intact and symmetric throughout upper and lower extremities, no extinction on double simultaneous stimulation   Coordination:  rapid alternating movements symmetric, normal finger to nose b/l, no LE dysmetria out of proportion to weakness  Station/Gait:  deferred    Dysphagia Screen  Per RN    Stroke Scales    NIHSS  1a. Level of Consciousness 0-->Alert, keenly responsive   1b. LOC Questions 0-->Answers both questions correctly   1c. LOC Commands 0-->Performs both tasks correctly   2.   Best Gaze 0-->Normal   3.   Visual 0-->No visual loss   4.   Facial Palsy 0-->Normal symmetrical movements   5a. Motor Arm, Left (S) 0-->No drift, limb holds 90 (or 45) degrees for full 10 secs (but slight preference to R with arm roll)   5b. Motor Arm, Right 0-->No drift, limb holds 90 (or 45) degrees for full 10 secs   6a. Motor Leg, Left 1-->Drift, leg falls by the end of the 5-sec period but does not hit bed   6b. Motor Leg, right 0-->No drift, leg holds 30 degree position for full 5 secs   7.   Limb Ataxia (S) 0-->Absent (not out of proportion to weakness)   8.   Sensory 0-->Normal, no sensory loss   9.   Best Language 0-->No aphasia, normal   10. Dysarthria 0-->Normal   11. Extinction and Inattention  0-->No abnormality   Total 1 (08/22/22 0927)       Modified Premier Score (Pre-morbid)  (S) 3 (3-4, uses walker outside the home, no cane within the home) - (S) Moderate disability.  Requires some help, but able to  walk unassisted. (3-4, uses walker outside the home, no cane within the home)    Imaging  I personally reviewed all imaging; relevant findings per HPI.    Labs Data   CBC  Recent Labs   Lab 08/22/22  0722 08/21/22 1929   WBC 6.1 6.6   RBC 3.98 4.24   HGB 12.2 13.2   HCT 36.8 38.6    217     Basic Metabolic Panel   Recent Labs   Lab 08/22/22  0200 08/21/22 1929   NA  --  141   POTASSIUM  --  3.8   CHLORIDE  --  108   CO2  --  25   BUN  --  37*   CR  --  0.96   GLC 91 101*   DEBI  --  9.2     Liver Panel  No results for input(s): PROTTOTAL, ALBUMIN, BILITOTAL, ALKPHOS, AST, ALT, BILIDIRECT in the last 168 hours.  INR    Recent Labs   Lab Test 08/21/22 1929   INR 1.12      Lipid Profile  No lab results found.  A1C    Recent Labs   Lab Test 08/21/22 1929   A1C 5.3     Troponin I    Recent Labs   Lab 08/21/22 1929   TROPONINIS 4          Stroke Consult Data Data   This was a non-emergent, non-telestroke consult.  Billing: I have personally spent a total of 70 minutes providing care today, time spent in reviewing medical records and reviewing tests, examining the patient and obtaining history, coordination of care, and discussion with the patient and/or family regarding diagnostic results, prognosis, symptom management, risks and benefits of management options, and development of plan of care. Greater than 50% was spent in counseling and coordination of care.

## 2022-08-22 NOTE — ED NOTES
Pt back from MRI was still hungry, eating sandwich again. Required IV US IV at MRI. Awaits results sitting up in bed. Independently eating.No new stroke findings Same L side arm and L leg. Speech clear. SR up x 2

## 2022-08-22 NOTE — ED NOTES
Pt ate dinner tray with no concerns, walked to br well with  1p standby. Ready for MRI, pt being seen by hospitalist at this time

## 2022-08-22 NOTE — PLAN OF CARE
Goal Outcome Evaluation:    Orientation/Cognitive: WDL; neuro's intact except pt continues to have left lower leg weakness (3/5) and weak dorsi/plantar flextion; unable to complete left heel to shin neuro  Observation Goals (Met/ Not Met): Not met  Mobility Level/Assist Equipment: Assist x 1 with walker/gait belt  Fall Risk (Y/N): Yes  Behavior Concerns: None  Pain Management: Denies pain  Tele/VS/O2: Tele SR; AVSS; RA  ABNL Lab/BG: None  Diet: Regular; pt passed dysphagia swallow exam  Bowel/Bladder: Voiding; no BM overnight  Skin Concerns: None  Drains/Devices: None  Tests/Procedures for next shift: Neurology,  PT,  Speech,  and CM/SW consults ordered; Echo  Anticipated DC date & active delays: To be decided  Patient Stated Goal for Today: Get some sleep

## 2022-08-22 NOTE — ED NOTES
"Received pot into care. NO IV access. @0G Iv statred. MD at bedside. Pt having new L weakness On L arm L leg.  uneven. Pt speech clear. DONI 3. No drift, no droop. GCS 15. Denies Cp no blurred vision, no dizziness. Cahrge at bedside attempting US IV. Ready for CT. Pt supine on cart, awaits tests. Pt also reports\" buckling\" on L side for 2 weeks, today went weak at 1600.  "

## 2022-08-22 NOTE — PROGRESS NOTES
PRIMARY DIAGNOSIS: TIA  OUTPATIENT/OBSERVATION GOALS TO BE MET BEFORE DISCHARGE:  1. Orthostatic performed: N/A    2. Diagnostic testing complete & at baseline neurologic testing: No    3. Cleared by consultants (if involved): No    4. Interpretation of cardiac rhythm per telemetry tech: SR    5. Tolerating adequate PO diet and medications: Yes    6. Return to near baseline physical activity or neurologic status: No    Discharge Planner Nurse   Safe discharge environment identified: No  Barriers to discharge: Yes       Entered by: Valeri Curry RN 08/22/2022 5:09 AM     Please review provider order for any additional goals.   Nurse to notify provider when observation goals have been met and patient is ready for discharge.

## 2022-08-22 NOTE — PROGRESS NOTES
RECEIVING UNIT ED HANDOFF REVIEW    ED Nurse Handoff Report was reviewed by: Valeri Curry RN on August 22, 2022 at 12:53 AM

## 2022-08-22 NOTE — PROGRESS NOTES
PRIMARY DIAGNOSIS: TIA  OUTPATIENT/OBSERVATION GOALS TO BE MET BEFORE DISCHARGE:  1. Orthostatic performed: N/A    2. Diagnostic testing complete & at baseline neurologic testing: No    3. Cleared by consultants (if involved): No    4. Interpretation of cardiac rhythm per telemetry tech: SR    5. Tolerating adequate PO diet and medications: Yes    6. Return to near baseline physical activity or neurologic status: No    Discharge Planner Nurse   Safe discharge environment identified: No  Barriers to discharge: Yes       Entered by: Valeri Curry RN 08/22/2022 3:17 AM     Please review provider order for any additional goals.   Nurse to notify provider when observation goals have been met and patient is ready for discharge.

## 2022-08-22 NOTE — H&P
Admitted: 08/21/2022    PRIMARY CARE PHYSICIAN:  Dr. Carlos Kim    CODE STATUS:  DNR/DNI, discussed with the patient and her daughter at bedside.    CHIEF COMPLAINT:  Left leg weakness and left arm weakness.    HISTORY OF PRESENT ILLNESS:  Ms. Jacinto is an 82-year-old female with a past medical history significant for paroxysmal atrial fibrillation, maintained on apixaban, previous stroke, hypothyroidism, peripheral neuropathy, who presents to the Emergency Department with the above concerns.  History is obtained through discussion with the ED physician, the patient and her daughter at bedside.  The patient states that she had just gone for a walk earlier today and this afternoon was having feelings like her left leg was buckling.  She did not actually fall, but she had weakness in her left leg associated with tingling and numbness.  The tingling and numbness has now resolved, but when she got here, she had a lot of difficulty raising her leg up off the bed.  She subsequently has been able to get up with her daughter and use a walker to walk to the bathroom and feels like her left leg is stronger.  About an hour after the left leg issue, she started developing left arm, achiness, which she describes going from her wrist up to her shoulder.  She had a little bit of tingling there, but this has now subsequently resolved.  She did not necessarily have any discoordination of the left arm and was able to use it without too much difficulty.  The daughter is at bedside and notes that all of these symptoms are very similar to when she had her prior stroke.  The daughter was in tune and with her speaking and thought that there might have been some subtle abnormalities, but they are no longer present if they were there.  No facial droop.  The patient denies any headache, vision changes or any feelings like her speech was off herself.    In the Emergency Department, the patient had a head CT showing evidence of a  left frontal lobe, age indeterminate infarcts, but in reviewing her MRI in the past she has had left-sided strokes in the past.  The patient denies any right sided symptoms.  She denies any chest pain, shortness of breath, abdominal pain, nausea, recent fevers or chills.  She has been walking regularly and has not had any symptoms leading up into today.  She is diligent about taking all of her medications including apixaban and has not missed any doses.  A code stroke was initiated and MRI has been ordered.  The patient left for the MRI shortly after I saw her in the Emergency Department.    The patient does tell me that this left sided leg buckling is not necessarily new.  She states that after she has been sitting for a while and gets up, she occasionally feels like her left leg was going to buckle over the past couple of weeks.  Nothing on the right side.  She denies any trauma to the leg and has not fallen.  She does not have any pain in her hip or knee.    PAST MEDICAL HISTORY:    1.  Paroxysmal atrial fibrillation, maintained on apixaban.  2.  Stroke.  3.  Hypothyroidism.  4.  Idiopathic peripheral neuropathy.  5.  Clostridium difficile colitis, recurrent.  6.  Stress cardiomyopathy.  7.  GERD.  8.  Osteoarthritis.  9.  Depression.  10.  BOOP.    MEDICATIONS:    Medications Prior to Admission   Medication Sig Dispense Refill Last Dose     apixaban ANTICOAGULANT (ELIQUIS) 2.5 MG tablet Take 2.5 mg by mouth 2 times daily   8/21/2022 at pm     aspirin 81 MG EC tablet Take 81 mg by mouth daily   8/21/2022 at pm     atorvastatin (LIPITOR) 20 MG tablet Take 20 mg by mouth daily   8/21/2022 at am     calcium carbonate 600 mg-vitamin D 400 units (CALTRATE) 600-400 MG-UNIT per tablet Take 1 tablet by mouth 2 times daily   8/21/2022 at pm     FLUoxetine (PROZAC) 20 MG capsule Take 40 mg by mouth every evening   8/21/2022 at pm     gabapentin (NEURONTIN) 100 MG capsule Take 300 mg by mouth At Bedtime   8/21/2022 at pm      levothyroxine (SYNTHROID/LEVOTHROID) 50 MCG tablet Take 50 mcg by mouth every evening   8/21/2022 at pm     multivitamin w/minerals (THERA-VIT-M) tablet Take 1 tablet by mouth daily   8/21/2022 at Unknown time     polyethylene glycol-propylene glycol PF (SYSTANE ULTRA PF) 0.4-0.3 % SOLN opthalmic solution Place 1 drop into both eyes 4 times daily as needed for dry eyes    at prn     vitamin D3 (CHOLECALCIFEROL) 50 mcg (2000 units) tablet Take 1 tablet by mouth every evening   8/21/2022 at pm         SOCIAL HISTORY:  The patient denies any use of tobacco or alcohol.    FAMILY HISTORY:  Mother had dementia.    ALLERGIES:       Allergies   Allergen Reactions     Sodium Hypochlorite Shortness Of Breath     Alendronic Acid Diarrhea     Raloxifene Other (See Comments)     RIND on 11/8/2019     Sulfa Drugs Rash     Sulfasalazine Rash         REVIEW OF SYSTEMS:  A complete review of systems reviewed and negative, save for the pertinent positives recorded in HPI.    PHYSICAL EXAMINATION:    VITAL SIGNS:  Show blood pressure of 162/59, heart rate 86, respirations 18, satting 99% on room air, temperature 97.6 degrees Fahrenheit.  GENERAL:  The patient is lying in bed and resting comfortably.  HEENT:  Pupils equal, round, reactive to light.  Extraocular muscle function intact.  No scleral icterus.  Oropharynx is clear.  NECK:  No lymphadenopathy or thyromegaly.  CARDIOVASCULAR:  Heart is regular rate and rhythm without any murmur, rub or gallop.  PULMONARY:  Lungs are clear to auscultation bilaterally without wheeze or rhonchi.  GASTROINTESTINAL:  Positive bowel sounds, soft, nontender and nondistended.  SKIN:  No new rashes or lesions.  LYMPHATICS:  No peripheral edema.  PSYCHIATRIC:  Alert and oriented x 3, normal affect.  NEUROLOGIC:  Cranial nerves II through XII are grossly intact.  Muscle strength is intact and symmetric in the upper extremities.  She has a good hand  strength bilaterally asymmetrically, though it  is limited by osteoarthritis.  She does have difficulty lifting her left leg up off the bed and it is definitely weaker as compared to the right.  This also persists with dorsi plantar flexion, which is weaker in the left.  Sensation is intact bilaterally.    LABORATORY DATA:  CBC, BMP unremarkable.  Glucose 101.  A CTA of the head and neck shows multiple areas of stenosis, ranging from mild to severe in the bilateral M2 segments, bilateral A2, A3 segment and there is a right supraclinoid ICA 2 mm outpouching that could represent a small aneurysm.  Head CT shows no acute intracranial current hemorrhage, but left frontal lobe white matter demonstrates multiple patchy age indeterminate infarcts.    EKG shows normal sinus rhythm.    IMPRESSION AND PLAN:  Ms. Jacinto is an 82-year-old female with a past medical history significant for paroxysmal atrial fibrillation, stroke, hypothyroidism, who presents to the Emergency Department with concerns for left sided arm and leg weakness.  1.  Left-sided arm and leg weakness:  The patient does have a history of stroke and states her symptoms previously were also on the left side and her symptoms today are very reminiscent of her stroke prior.  CT does show left frontal lobe, age indeterminate infarcts, but she does have an MRI from prior that shows left sided infarcts as well.  This would not necessarily fit with her symptoms today.  Her symptoms now have improved, though she still has some left leg weakness.  Neurology has recommended MRI, which is being done currently.  We will admit via the stroke pathway for further workup including therapies, echo, telemetry and a stroke consult.  We will continue with aspirin and defer to Neurology in the morning about resuming her PTA apixaban as she has already taken her evening doses and there is a hold to full anticoagulation for now.  Of note, her next scheduled dose of aspirin as tomorrow evening.  2.  Paroxysmal atrial  fibrillation:  She is currently in sinus rhythm.  Defer plans for anticoagulation to Neurology.  3.  Depression:  Continue with Prozac.  4.  Idiopathic peripheral neuropathy:  Continue with Neurontin.  5.  Hypothyroidism:  Continue with levothyroxine.  6.  Deep venous thrombosis prophylaxis:  She took her last dose of apixaban this evening, so is still fully anticoagulated.  7.  Disposition:  Needs additional stroke workup, so admitted via observation for now with low threshold to advance to inpatient if she is unable to go home tomorrow if ongoing.    Michele Vargas DO        D: 2022   T: 2022   MT: LJ    Name:     PILAR MANDEL  MRN:      [id number]        Account:     [id number]   :      1940           Admitted:    2022       Document: [id number]

## 2022-08-22 NOTE — PLAN OF CARE
SLP: Orders received. Chart reviewed and discussed with care team.  SLP not indicated due to passing the swallow screen and tolerating a regula diet and thin liquids per nurse and patient. Her speech/language is intact during a short conversation and MRI was negative. Defer discharge recommendations to medical team. Will complete orders.

## 2022-08-22 NOTE — ED PROVIDER NOTES
Emergency Department Attending Supervision Note  8/21/2022  7:25 PM      I evaluated this patient in conjunction with JEANINE Yusuf      Briefly, the patient presented with weakness in her left arm and left leg that started about 4:30 PM today.  Initially the weakness was not identified in triage, patient reported A tingly pain sensation that came and went.  However when we assessed her in person she was still having some focal weakness of particularly of her left leg which seemed much more weak than her right leg with hip flexion.  At this point tier 1 code stroke was called.      On my exam,   Gen: well appearing, in no acute distress  Oral : Mucous membranes moist,   Nose: No rhinorhea  Ears: External near normal, without drainage  Eyes: periorbital tissues and sclera normal   Neck: supple, no abnormal swelling  Lungs: Clear bilaterally, no tachypnea or distress, speaks full sentences  CV: Regular rate, regular rhythm  Abd: soft, nontender, nondistended, no rebound/guarding  Ext: no lower extremity edema  Skin: warm, dry, well perfused, no rashes/bruising/lesions on exposed skin  Neuro: Weakness in the left thigh with hip flexion compared to right, subjective weakness of left arm compared to right, no aphasia, no dysarthria.   Psych: pleasant mood, normal affect        ED course:    Tier 1 code stroke called out.  Patient routine CT CTA.  Questionable occlusion versus stenosis of the right PRISCA, spoke to neurology no intervention recommended at this time.  Not a systemic tPA candidate given her Eliquis use.  She had 2 doses of that today.  Neurology recommended MRI and inpatient management for acute stroke.  Contacted hospitalist is in agreement.    My impression is acute ischemic stroke        Diagnosis    ICD-10-CM    1. Weakness of left lower extremity  R29.898    2. Left sided numbness  R20.0          Hang Farias,*          Hang Farias MD  08/21/22 2040

## 2022-08-23 ENCOUNTER — APPOINTMENT (OUTPATIENT)
Dept: PHYSICAL THERAPY | Facility: CLINIC | Age: 82
DRG: 068 | End: 2022-08-23
Attending: INTERNAL MEDICINE
Payer: COMMERCIAL

## 2022-08-23 PROBLEM — I63.9 CEREBROVASCULAR ACCIDENT (CVA), UNSPECIFIED MECHANISM (H): Status: ACTIVE | Noted: 2022-08-23

## 2022-08-23 PROBLEM — G45.9 TIA (TRANSIENT ISCHEMIC ATTACK): Status: ACTIVE | Noted: 2022-08-23

## 2022-08-23 LAB
GLUCOSE BLDC GLUCOMTR-MCNC: 96 MG/DL (ref 70–99)
SARS-COV-2 RNA RESP QL NAA+PROBE: NEGATIVE

## 2022-08-23 PROCEDURE — 250N000013 HC RX MED GY IP 250 OP 250 PS 637: Performed by: PHYSICIAN ASSISTANT

## 2022-08-23 PROCEDURE — 120N000001 HC R&B MED SURG/OB

## 2022-08-23 PROCEDURE — 99233 SBSQ HOSP IP/OBS HIGH 50: CPT | Mod: FS | Performed by: PSYCHIATRY & NEUROLOGY

## 2022-08-23 PROCEDURE — 82962 GLUCOSE BLOOD TEST: CPT

## 2022-08-23 PROCEDURE — 250N000013 HC RX MED GY IP 250 OP 250 PS 637: Performed by: INTERNAL MEDICINE

## 2022-08-23 PROCEDURE — 99356 PR PROLONGED SERV,INPATIENT,1ST HR: CPT | Mod: FS | Performed by: PSYCHIATRY & NEUROLOGY

## 2022-08-23 PROCEDURE — U0005 INFEC AGEN DETEC AMPLI PROBE: HCPCS | Performed by: HOSPITALIST

## 2022-08-23 PROCEDURE — G0378 HOSPITAL OBSERVATION PER HR: HCPCS

## 2022-08-23 PROCEDURE — 97161 PT EVAL LOW COMPLEX 20 MIN: CPT | Mod: GP

## 2022-08-23 PROCEDURE — 99232 SBSQ HOSP IP/OBS MODERATE 35: CPT | Performed by: HOSPITALIST

## 2022-08-23 RX ADMIN — APIXABAN 2.5 MG: 2.5 TABLET, FILM COATED ORAL at 19:50

## 2022-08-23 RX ADMIN — LEVOTHYROXINE SODIUM 50 MCG: 50 TABLET ORAL at 19:50

## 2022-08-23 RX ADMIN — FLUOXETINE 40 MG: 20 CAPSULE ORAL at 19:50

## 2022-08-23 RX ADMIN — GABAPENTIN 300 MG: 300 CAPSULE ORAL at 23:09

## 2022-08-23 RX ADMIN — ATORVASTATIN CALCIUM 20 MG: 20 TABLET, FILM COATED ORAL at 08:52

## 2022-08-23 RX ADMIN — ACETAMINOPHEN 650 MG: 325 TABLET ORAL at 12:15

## 2022-08-23 RX ADMIN — ASPIRIN 81 MG: 81 TABLET, COATED ORAL at 19:50

## 2022-08-23 RX ADMIN — APIXABAN 2.5 MG: 2.5 TABLET, FILM COATED ORAL at 08:52

## 2022-08-23 RX ADMIN — MELATONIN TAB 3 MG 3 MG: 3 TAB at 23:09

## 2022-08-23 ASSESSMENT — ACTIVITIES OF DAILY LIVING (ADL)
WALKING_OR_CLIMBING_STAIRS_DIFFICULTY: NO
ADLS_ACUITY_SCORE: 33
FALL_HISTORY_WITHIN_LAST_SIX_MONTHS: NO
DOING_ERRANDS_INDEPENDENTLY_DIFFICULTY: NO
TOILETING_ISSUES: NO
ADLS_ACUITY_SCORE: 33
ADLS_ACUITY_SCORE: 36
ADLS_ACUITY_SCORE: 35
CHANGE_IN_FUNCTIONAL_STATUS_SINCE_ONSET_OF_CURRENT_ILLNESS/INJURY: NO
ADLS_ACUITY_SCORE: 24
ADLS_ACUITY_SCORE: 35
DRESSING/BATHING_DIFFICULTY: NO
WEAR_GLASSES_OR_BLIND: YES
ADLS_ACUITY_SCORE: 35
VISION_MANAGEMENT: GLASSES
ADLS_ACUITY_SCORE: 35
DIFFICULTY_COMMUNICATING: NO
DIFFICULTY_EATING/SWALLOWING: NO
CONCENTRATING,_REMEMBERING_OR_MAKING_DECISIONS_DIFFICULTY: NO
ADLS_ACUITY_SCORE: 35

## 2022-08-23 NOTE — PROGRESS NOTES
"      [facility]    Stroke Progress Note    Interval EventsDenies recurrent episodes of left leg shaking or \"giving out\" overnight. Reports that these episodes happened approximately 1.5 years ago, prior to her stroke and did not happen again until this past week. She continues to have mild left leg weakness this morning.     HPI Summary  82 year old female with hx of atrial fibrillation on Eliquis, osteoarthritis, elevated BP without diagnosis of hypertension, idiopathic peripheral neuropathy takes gabapentin, episodic transient tic-type left upper extremity movements, prior PRISCA territory stroke, hypothyroidism. She is also on PTA ASA 81 mg daily and Lipitor 20 mg daily.      She presented to [facility] ED 8/21/22 with L arm and leg weakness and numbness. She reports intermittent leg symptoms symptoms for approximately 1 week prior to arrival, the arm symptoms were new. CT/CTA showed old L frontal infarcts and multiple areas of intracranial atherosclerotic stenoses mild to severe (specifically b/l MCA and PRISCA). Her MRI was negative for acute stroke. On exam there is continued weakness to LLE, she says L leg has been \"buckling\" for about a week. While inpatient, she had an episode of recurrent sharp pain to her L arm and some worsening of the weakness. It resolved after a few minutes. We had her get up to stand and as she stood up she had recurrent event of left lower extremity \"buckling\"/shaking.    TIA Evaluation Summarized     MRI and/or Head CT  CT perfusion: CBF/CBV symmetric, elevated MTT and TTD in the R PRISCA and MCA distributions.   MRI: negative for acute stroke, showed indeterminate 10 mm sclerotic focus C2 vertebral body, mild chronic SVID, chronic L frontal lobe infarcts   CTH: L frontal lobe multiple patchy age-indeterminate infarcts, L maxillary alveolar ridge partially visualized punctate radiopaque foreign body   Intracranial Vasculature CTA brain: b/l carotid siphons mild stenosis " "from calcified plaque, b/l M2 multiple stenoses ranging from mild to severe, b/l A2/3 multiple stenosises mild to severe, R supraclinoid ICA 2 mm infundibulum versus small aneurysm   Cervical Vasculature CTA neck: unremarkable      Echocardiogram TTE: EF 55%. Grade I or early diastolic dysfunction. no wma,LA mildly dilated. Right atrial normal. no  atrial shunt seen, SR   EKG/Telemetry SR, nonspecific ST abnormality   Other Testing  cEEG: Pending      LDL 8/22/22: 57   A1C 8/21/2022: 5.3 %      Impression   Episodic MILLY/LLE weakness/numbness/shaking - Noted to have has significant intracranial atherosclerosis which includes b/l MCA/PRISCA mild to severe stenoses, as well as atrial fibrillation on therapeutic anticoagulation. Concern for possible MRI negative stroke vs limb shaking TIA vs possible focal seizure versus other movement disorder     L arm recurrent pain of unclear etiology reported as far back as 2019, history of painful idiopathic peripheral neuropathy     Indeterminate 10 mm sclerotic focus C2 vertebral body     B/l carotid siphons mild stenosis from calcified plaque     Plan  - Q4 hour neuro checks   - Continue PTA Eliquis + ASA 81 mg daily now, consideration for alternate antiplatelet agents pending remainder of workup  - Continue PTA Lipitor 20 mg daily with goal LDL 40-70, LDL <40 increases risk of intracranial hemorrhage  - BP goal 120-180, avoid hypotension, if recurrent symptoms then she should lay flat and check blood pressure  - Blood glucose monitoring, Hgb A1c 5.3%, (goal <7% for secondary stroke prevention)  - Therapies   - Awaiting EEG     Patient Follow-up    - final recommendation pending work-up    We will continue to follow.     TANYA Jones, CNP  Neurology  To page me or covering stroke neurology team member, click here: [facility]   Choose \"On Call\" tab at top, then search dropdown box for \"Neurology Adult\", select location, press Enter, then look for stroke/neuro " ICU/telestroke.    ______________________________________________________    Clinically Significant Risk Factors Present on Admission              # Coagulation Defect: home medication list includes an anticoagulant medication       Medications   Scheduled Meds    apixaban ANTICOAGULANT  2.5 mg Oral BID     aspirin  81 mg Oral QPM     atorvastatin  20 mg Oral Daily     FLUoxetine  40 mg Oral QPM     gabapentin  300 mg Oral At Bedtime     levothyroxine  50 mcg Oral QPM     sodium chloride (PF)  3 mL Intracatheter Q8H       Infusion Meds    - MEDICATION INSTRUCTIONS -       - MEDICATION INSTRUCTIONS -       - MEDICATION INSTRUCTIONS -         PRN Meds  acetaminophen, labetalol **OR** hydrALAZINE, lidocaine 4%, lidocaine (buffered or not buffered), - MEDICATION INSTRUCTIONS -, - MEDICATION INSTRUCTIONS -, melatonin, ondansetron **OR** ondansetron, - MEDICATION INSTRUCTIONS -, polyethylene glycol-propylene glycol PF, prochlorperazine **OR** prochlorperazine **OR** prochlorperazine, sodium chloride (PF)       PHYSICAL EXAMINATION  Temp:  [97.5  F (36.4  C)-98.3  F (36.8  C)] 98.3  F (36.8  C)  Pulse:  [70-87] 87  Resp:  [16] 16  BP: (116-139)/(55-66) 132/66  SpO2:  [94 %-99 %] 97 %      Neurologic  Mental Status:  alert, oriented x 3, follows commands, speech clear and fluent, naming and repetition normal  Cranial Nerves:  visual fields intact, PERRL, EOMI with normal smooth pursuit, facial movements symmetric, hearing not formally tested but intact to conversation, no dysarthria, shoulder shrug strong bilaterally, tongue protrusion midline  Motor:  normal muscle tone and bulk, no abnormal movements, able to move all limbs spontaneously, no pronator drift, LLE drift (doesn't hit bed)  Reflexes:  toes down-going  Sensory:  light touch sensation intact and symmetric throughout upper and lower extremities, no extinction on double simultaneous stimulation   Coordination:  FTN without dysmetria  Station/Gait:   deferred    Stroke Scales    NIHSS  1a. Level of Consciousness 0-->Alert, keenly responsive   1b. LOC Questions 0-->Answers both questions correctly   1c. LOC Commands 0-->Performs both tasks correctly   2.   Best Gaze 0-->Normal   3.   Visual 0-->No visual loss   4.   Facial Palsy 0-->Normal symmetrical movements   5a. Motor Arm, Left 0-->No drift, limb holds 90 (or 45) degrees for full 10 secs   5b. Motor Arm, Right 0-->No drift, limb holds 90 (or 45) degrees for full 10 secs   6a. Motor Leg, Left 1-->Drift, leg falls by the end of the 5-sec period but does not hit bed   6b. Motor Leg, right 0-->No drift, leg holds 30 degree position for full 5 secs   7.   Limb Ataxia 0-->Absent   8.   Sensory 0-->Normal, no sensory loss   9.   Best Language 0-->No aphasia, normal   10. Dysarthria 0-->Normal   11. Extinction and Inattention  0-->No abnormality   Total 1 (08/23/22 1550)       Modified Summit Score (Pre-morbid)  0 - No symptoms.    Imaging  I personally reviewed all imaging; relevant findings per HPI.     Lab Results Data   CBC  Recent Labs   Lab 08/22/22  0722 08/21/22 1929   WBC 6.1 6.6   RBC 3.98 4.24   HGB 12.2 13.2   HCT 36.8 38.6    217     Basic Metabolic Panel    Recent Labs   Lab 08/23/22  0817 08/22/22  2213 08/22/22  1155 08/22/22  0737 08/22/22  0722 08/22/22  0200 08/21/22 1929   NA  --   --   --   --  140  --  141   POTASSIUM  --   --   --   --  4.1  --  3.8   CHLORIDE  --   --   --   --  111*  --  108   CO2  --   --   --   --  24  --  25   BUN  --   --   --   --  27  --  37*   CR  --   --   --   --  0.90  --  0.96   GLC 96 86 92   < > 85   < > 101*   DEBI  --   --   --   --  8.4*  --  9.2    < > = values in this interval not displayed.     Liver Panel  No results for input(s): PROTTOTAL, ALBUMIN, BILITOTAL, ALKPHOS, AST, ALT, BILIDIRECT in the last 168 hours.  INR    Recent Labs   Lab Test 08/21/22  1929   INR 1.12      Lipid Profile    Recent Labs   Lab Test 08/22/22  0722   CHOL 139   HDL 74    LDL 57   TRIG 40     A1C    Recent Labs   Lab Test 08/21/22 1929   A1C 5.3     Troponin I    Recent Labs   Lab 08/21/22 1929   TROPONINIS 4        Billing: I have personally spent a total of 30 minutes providing care today, time spent in reviewing medical records and reviewing tests, examining the patient and obtaining history, coordination of care, and discussion with the patient and/or family regarding diagnostic results, prognosis, symptom management, risks and benefits of management options, and development of plan of care. Greater than 50% was spent in counseling and coordination of care.

## 2022-08-23 NOTE — PLAN OF CARE
Reason for Admission: TIA    Cognitive/Mentation: A/Ox 4  Neuros/CMS: Intact ex LLE slightly weaer  VS: stable.   Tele: NSR.  GI: BS active x4, passing flatus, last BM 8/22/22. Continent.  : Voiding adequate amounts. Continent.  Pulmonary: LS clar throughout.  Pain: reported 6/10, PRN tylenol given, improvement in pain.     Drains/Lines: PIV SL  Skin: Intact  Activity: Assist x 1 with GB/W.  Diet: Regular with thin liquids. Takes pills whole with water.     Therapies recs: pending  Discharge: pending, getting 24 hour EEG    Aggression Stoplight Tool: Green    End of shift summary: PT getting 24 hour EEG. Pt weakness appears to be improving. Pt has not had any L arm pain or LLE spasm during shift.

## 2022-08-23 NOTE — UTILIZATION REVIEW
Admission Status; Secondary Review Determination    Under the authority of the Utilization Management Committee, the utilization review process indicated a secondary review on the above patient. The review outcome is based on review of the medical records, discussions with staff, and applying clinical experience noted on the date of the review.    (x) Inpatient Status Appropriate - This patient's medical care is consistent with medical management for inpatient care and reasonable inpatient medical practice.    RATIONALE FOR DETERMINATION: Complex 82-year-old female with known paroxysmal atrial fibrillation on anticoagulation, previous stroke, peripheral neuropathy who presented with new onset of left leg buckling after doing a successful walk.  Along with this left leg weakness patient had paresthesias and numbness.  CT of the brain revealed old left frontal lobe infarcts MRI of the brain did not reveal any acute infarcts and CT angiogram revealed significant distal PRISCA occlusion/stenosis along with significant left PRISCA A2 segment stenosis.  Patient continued to have significant recurrent left lower extremity weakness concerning for a MRI negative stroke versus significant symptomatic TIA with patient's severe intracranial stenosis.  Patient needed significant work-up over several days including EEG, maintain blood pressure greater than 120 systolic and serial neurological exams appropriate for inpatient care.    At the time of admission with the information available to the attending physician more than 2 nights Hospital complex care was anticipated, based on patient risk of adverse outcome if treated as outpatient and complex care required. Inpatient admission is appropriate based on the Medicare guidelines.    This document was produced using voice recognition software    The information on this document is developed by the utilization review team in order for the business office to ensure compliance. This  only denotes the appropriateness of proper admission status and does not reflect the quality of care rendered.    The definitions of Inpatient Status and Observation Status used in making the determination above are those provided in the CMS Coverage Manual, Chapter 1 and Chapter 6, section 70.4.    Sincerely,    Franco Jolly MD  Utilization Review  Physician Advisor  [facility].

## 2022-08-23 NOTE — PROGRESS NOTES
PRIMARY DIAGNOSIS: SYNCOPE/TIA  OUTPATIENT/OBSERVATION GOALS TO BE MET BEFORE DISCHARGE:  1. Orthostatic performed: Yes:          Lying Orthostatic BP: 138/58         Sitting Orthostatic BP: 120/66     2. Diagnostic testing complete & at baseline neurologic testing: No    3. Cleared by consultants (if involved): No    4. Interpretation of cardiac rhythm per telemetry tech: Sinus bradycardia    5. Tolerating adequate PO diet and medications: Yes    6. Return to near baseline physical activity or neurologic status: Yes    Discharge Planner Nurse   Safe discharge environment identified: No  Barriers to discharge: PT/OT CONSULT, 24 HR EEG       Entered by: Saba Melton RN 08/23/2022 5:34 AM     Please review provider order for any additional goals.   Nurse to notify provider when observation goals have been met and patient is ready for discharge.

## 2022-08-23 NOTE — PROGRESS NOTES
PRIMARY DIAGNOSIS: SYNCOPE/TIA  OUTPATIENT/OBSERVATION GOALS TO BE MET BEFORE DISCHARGE:  1. Orthostatic performed: Yes:          Lying Orthostatic BP: 122/67         Sitting Orthostatic BP: 136/66         Standing Orthostatic BP: 129/80     2. Diagnostic testing complete & at baseline neurologic testing: No    3. Cleared by consultants (if involved): No    4. Interpretation of cardiac rhythm per telemetry tech: Sinus bradycardia    5. Tolerating adequate PO diet and medications: Yes    6. Return to near baseline physical activity or neurologic status: No    Discharge Planner Nurse   Safe discharge environment identified: No  Barriers to discharge: Diagnostic testing pending       Entered by: Saba Melton RN 08/22/2022 10:23 PM     Please review provider order for any additional goals.   Nurse to notify provider when observation goals have been met and patient is ready for discharge.

## 2022-08-23 NOTE — CONSULTS
Care Management Initial Consult    General Information  Assessment completed with: PatientQuiana  Type of CM/SW Visit: Initial Assessment    Primary Care Provider verified and updated as needed: No   Readmission within the last 30 days: no previous admission in last 30 days      Reason for Consult: discharge planning  Advance Care Planning: Advance Care Planning Reviewed: no concerns identified          Communication Assessment  Patient's communication style: spoken language (English or Bilingual)    Hearing Difficulty or Deaf: yes        Cognitive  Cognitive/Neuro/Behavioral: WDL  Level of Consciousness: alert  Arousal Level: opens eyes spontaneously  Orientation: oriented x 4  Mood/Behavior: calm, cooperative  Best Language: 0 - No aphasia  Speech: clear, spontaneous, logical    Living Environment:   People in home: alone     Current living Arrangements: independent living facility  Name of Facility: [facility]   Able to return to prior arrangements: yes       Family/Social Support:  Care provided by: self  Provides care for: no one  Marital Status: Single  Children          Description of Support System:      Support Assessment: Adequate family and caregiver support    Current Resources:   Patient receiving home care services: No     Community Resources: Senior Exercise (for balance)  Equipment currently used at home: walker, rolling  Supplies currently used at home: None    Employment/Financial:  Employment Status: retired        Financial Concerns: No concerns identified           Lifestyle & Psychosocial Needs:  Social Determinants of Health     Tobacco Use: Not on file   Alcohol Use: Not on file   Financial Resource Strain: Not on file   Food Insecurity: Not on file   Transportation Needs: Not on file   Physical Activity: Not on file   Stress: Not on file   Social Connections: Not on file   Intimate Partner Violence: Not on file   Depression: Not on file   Housing Stability: Not on file       Functional  Status:  Prior to admission patient needed assistance:   Dependent ADLs:: Independent  Dependent IADLs:: Transportation       Mental Health Status:  Mental Health Status: No Current Concerns       Chemical Dependency Status:                Values/Beliefs:  Spiritual, Cultural Beliefs, Judaism Practices, Values that affect care: no               Additional Information:  CM consult for home care/discharge planning.  Met with patient.  Explained outpatient observation and provided her with [facility].  Patient lives alone in [place].  She does have a cane and walker she uses when outside of her home.  She states she has 3 children that live in the [place] area.  Discussed C and she is in agreement.  She would like referral sent to [facility].  She has no other concerns about discharge at this time.    Referral faxed to [facility] for PT and OT (fax [phone]).    Noa Fuentes RN, BSN, PHN  Inpatient Care Coordination  [facility]  Phone: [phone]

## 2022-08-23 NOTE — PLAN OF CARE
OT: Orders received. Chart reviewed and discussed with care team.  Per PT, OT not indicated due to at baseline with ADLS. PT to address mobility while inpatient.  Defer discharge recommendations to PT.  Will complete orders.

## 2022-08-23 NOTE — PLAN OF CARE
Reason for Admission: Left lower numbness/ weakness    Cognitive/Mentation: A/Ox 4  Neuros/CMS: Intact ex left lower weakness  VS: stable.   Tele: SR.  GI: BS active, Continent.  : Continent.  Pulmonary: LS clear.  Pain: denies.   Skin: intact  Activity: Assist x 1 with GB and walker.  Diet: regular with thin liquids. Takes pills whole.     Therapies recs: home with home care  Discharge: pending 24 hour EEG.    Aggression Stoplight Tool: green    End of shift summary: Patient is on 24 hour EEG for left leg uncontrolled twitching episodes.  Please time mary any leg shaking episodes in EEG.

## 2022-08-23 NOTE — PROGRESS NOTES
[facility]    Medicine Progress Note - Hospitalist Service    Date of Admission:  8/21/2022    Assessment & Plan        Ms. Jacinto is an 82-year-old female with a past medical history significant for paroxysmal atrial fibrillation, stroke, hypothyroidism, who presents to the Emergency Department with concerns for left sided arm and leg weakness.     Left-sided arm pain and leg weakness  The patient does have a history of stroke and states her symptoms previously were also on the left side and her symptoms today are very reminiscent of her stroke prior.  CT does show left frontal lobe, age indeterminate infarcts, but she does have an MRI from prior that shows left sided infarcts as well.  This would not necessarily fit with her symptoms today.  Her symptoms now have improved, though she still has some left leg weakness.  - Neurology following, awaiting final recs - appreciate assistance  - PT/OT   - Neuro rec continuing apixaban and aspirin  - A1c 5.3%  - LDL 57, atorvastatin 20 mg daily continued  - echo with normal EF, fairly unremarkable  - 8/22 - CT perfusion showed delayed perfusion of R PRISCA and R MCA distributions  -  Final dispo plans after neuro stroke completes eval and confirms recs, as well as therapy evaluations  --- 24 hr eeg today, plans per neuro subsequently.  Defer neuro checks to neurology as well  - 8/23 stable/slightly improved LLE weakness during my encounter midday     Paroxysmal atrial fibrillation  She is currently in sinus rhythm.  Defer plans for anticoagulation to Neurology.     Depression  Continue with Prozac.     Idiopathic peripheral neuropathy  Continue with Neurontin.     Hypothyroidism  Continue with levothyroxine.      Diet: Regular Diet Adult    DVT Prophylaxis: Pneumatic Compression Devices  Limon Catheter: Not present  Central Lines: None  Cardiac Monitoring: ACTIVE order. Indication: Stroke, acute (48 hours)  Code Status: No CPR- Do NOT Intubate   "    Disposition Plan      Expected Discharge Date: 08/25/2022                The patient's care was discussed with the Bedside Nurse and Patient.    Michele Santos MD  Hospitalist Service  [facility]  Securely message with the Vocera Web Console (learn more here)  Text page via Reflex Paging/Directory         Clinically Significant Risk Factors Present on Admission               # Coagulation Defect: home medication list includes an anticoagulant medication            ______________________________________________________________________    Interval History   Seen and examined midday. EEG leads in place. L arm pain/weakness improved - \"it hasn't happened today.\"  No sob, no chest pain, no vision changes. L leg now able to raise off bed, which is better than yesterday.  Now on neuro specialties floor.     Data reviewed today: I reviewed all medications, new labs and imaging results over the last 24 hours. I personally reviewed no images or EKG's today.    Physical Exam   Vital Signs: Temp: 98.3  F (36.8  C) Temp src: Oral BP: 128/64 Pulse: 85   Resp: 14 SpO2: 94 % O2 Device: None (Room air)    Weight: 101 lbs 12.8 oz    Gen: NAD, very pleasant  HEENT: EOMI, MMM, EEG leads in place  Resp: no crackles,  no wheezes, no increased work of resp  CV: S1S2 heard, reg rhythm, reg rate  Abdo: soft, nontender, nondistended, bowel sounds present  Ext: calves nontender, well perfused  Neuro: aa, conversant, BUE strength sym likely appropriate for age, LLE now able to raise off bed, weaker than R still, CN grossly intact, no facial asymmetry      Data   Recent Labs   Lab 08/23/22  0817 08/22/22  2213 08/22/22  1155 08/22/22  0737 08/22/22  0722 08/22/22  0200 08/21/22  1929   WBC  --   --   --   --  6.1  --  6.6   HGB  --   --   --   --  12.2  --  13.2   MCV  --   --   --   --  93  --  91   PLT  --   --   --   --  210  --  217   INR  --   --   --   --   --   --  1.12   NA  --   --   --   --  140  --  " 141   POTASSIUM  --   --   --   --  4.1  --  3.8   CHLORIDE  --   --   --   --  111*  --  108   CO2  --   --   --   --  24  --  25   BUN  --   --   --   --  27  --  37*   CR  --   --   --   --  0.90  --  0.96   ANIONGAP  --   --   --   --  5  --  8   DEBI  --   --   --   --  8.4*  --  9.2   GLC 96 86 92   < > 85   < > 101*    < > = values in this interval not displayed.     Recent Results (from the past 24 hour(s))   CT Head Perfusion w Contrast    Narrative    EXAM: CT HEAD PERFUSION W CONTRAST  LOCATION: [facility]  DATE/TIME: 8/22/2022 9:47 PM    INDICATION: Recurrent left-sided weakness, shaking, buckling; evaluate for hypoperfusion given intracranial stenoses.  COMPARISON: None.  TECHNIQUE: CT cerebral perfusion was performed. Perfusion data were post processed with generation of standard perfusion maps and estimation of ischemic/infarcted volumes utilizing standard threshold values. Dose reduction techniques were used.   CONTRAST: 50 mL Isovue 370  COMPARISON: None.    FINDINGS:     CT PERFUSION:  PERFUSION MAPS: Cerebral blood volume and cerebral blood flow maps are symmetric and unremarkable. Multifocal areas of increased mean transit time (MTT) and time-to-delay (TTD) are noted, most evident along the parasagittal posterior right frontal and   ventral parasagittal right parietal lobe with corresponding elevated time to maximum (Tmax). Elevated MTT and TTD are also seen along the superior lateral aspects of the right frontal lobe. No corresponding acute ischemia was seen in these locations on   prior MRI brain.      Impression    IMPRESSION:   1. Cerebral blood flow and cerebral blood volume maps are symmetric and otherwise unremarkable.  2. Multifocal areas of elevated TTD and TTD are seen. These areas of elevation are most evident along the parasagittal posterior right frontal and ventral right parietal lobes where there is also Tmax. These findings are compatible with delayed  perfusion   of the posterior right PRISCA distribution. Elevated TTD and MTT are also seen along the superior lateral aspect of the right frontal and parietal lobes (right MCA distribution).    Findings discussed with Nurse Practioner Ran Quiñones at approximately 10:10 PM.

## 2022-08-23 NOTE — PHARMACY-CONSULT NOTE
Pharmacy Consult to evaluate for medication related stroke core measures    Leesa Jacinto, 82 year old female admitted for L frontal infarcts and multiple areas of intracranial atherosclerotic stenoses mild to severe (specifically b/l MCA and PRISCA) on 8/21/2022.    Thrombolytic was not given because of Time from onset contraindications, DOAC use     VTE Prophylaxis SCDs /PCDs placed on 8/22, as appropriate prior to end of hospital day 2.    Antithrombotic: Aspirin from prior to admission and continued as appropriate by end of hospital day 2. Continue antithrombotic therapy on discharge to meet quality measures, unless contraindicated.    Anticoagulation if history of A-fib/flutter: Patient on apixaban (Eliquis); continue anticoagulation on discharge to meet quality measures, unless contraindicated.    LDL Cholesterol Calculated   Date Value Ref Range Status   08/22/2022 57 <=100 mg/dL Final       Patient currently receiving Lipitor (atorvastatin) continue statin on discharge to meet quality measures, unless contraindicated.    Recommendations: None at this time    Thank you for the consult.    Rachel Weller RPH 8/23/2022 12:42 PM

## 2022-08-23 NOTE — CONSULTS
Stroke Education Note    The following information has been reviewed with the patient and family:    1. Warning signs of stroke    2. Calling 911 if having warning signs of stroke    3. All modifiable risk factors: hypertension, CAD, atrial fib, diabetes, hypercholesterolemia, smoking, substance abuse, diet, physical inactivity, obesity, sleep apnea.    4. Patient's risk factors for stroke which include: Atrial fibrillation    5. Follow-up plan for after discharge    6. Discharge medications which include: apixaban, ASA, Lipitor    In addition, the above information was given to the patient and family in writing as a part of the [facility] Stroke Class Handout.    Learner's response to risk factors / lifestyle modification education: Taking steps     Susana Kolb RN

## 2022-08-23 NOTE — PROGRESS NOTES
08/23/22 1411   Quick Adds   Quick Adds Certification   Type of Visit Initial PT Evaluation   Living Environment   People in Home alone   Current Living Arrangements independent living facility   Home Accessibility no concerns   Living Environment Comments Handicap accessible, grab bar by toilet.   Self-Care   Usual Activity Tolerance fair   Current Activity Tolerance poor   Equipment Currently Used at Home walker, rolling   Activity/Exercise/Self-Care Comment Kayli no AD, 4WW longer distances. Has a cane too.   General Information   Onset of Illness/Injury or Date of Surgery 08/21/22   Referring Physician Michele Vargas, DO   Patient/Family Therapy Goals Statement (PT) To be better.   Pertinent History of Current Problem (include personal factors and/or comorbidities that impact the POC) TIA & orthostasis syncope (MRI negative, EEG in progress) with acute on chronic L numbness & weakness, shaking (buckling the last week or two). 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   Existing Precautions/Restrictions fall   General Observations SBPs all >= 120 today.   Cognition   Affect/Mental Status (Cognition) WFL   Follows Commands (Cognition) WFL   Pain Assessment   Patient Currently in Pain No   Posture    Posture Comments Impaired controlled mobility   Range of Motion (ROM)   ROM Comment WFL   Strength (Manual Muscle Testing)   Strength Comments MMT: shoulder abd/IR/ER 4+ to 5B, hip flx 4+L with cogwheel motor recruitment 5R, hip abd 4+B, add 5B, knee flx 5B, knee ext 4+L cogwheel engagement 5R, DF 5B.   Bed Mobility   Comment, (Bed Mobility) Kayli   Transfers   Comment, (Transfers) CGA no AD, Kayli RW - stands, pivots, toilet tx with grab bar.   Gait/Stairs (Locomotion)   Distance in Feet (Required for LE Total Joints) 100' in-room: CGA no AD high guard, unsteady, discontinuous, poor self-effiacy (good judgment).    Kayli RW.   Balance   Balance Comments Kayli sit balance, falls risk well managed by RW without signs of knee instabiltiy (UE WB)   Coordination   Coordination Comments Symmetrical/intact alt toe taps, alt heel taps, alt foot taps (wtih impvoement motor engagement with task).   Clinical Impression   Criteria for Skilled Therapeutic Intervention Yes, treatment indicated   PT Diagnosis (PT) Impaired mobility   Influenced by the following impairments Impaired strength, balance, self-efficacy, activity tolerance   Functional limitations due to impairments Impaired independent mobility without AD.   Clinical Presentation (PT Evaluation Complexity) Stable/Uncomplicated   Clinical Decision Making (Complexity) low complexity   Planned Therapy Interventions (PT) balance training;bed mobility training;gait training;home exercise program;motor coordination training;neuromuscular re-education;patient/family education;postural re-education;stair training;strengthening;transfer training;progressive activity/exercise;risk factor education;home program guidelines   Anticipated Equipment Needs at Discharge (PT) walker, rolling  (pt has AD)   Risk & Benefits of therapy have been explained evaluation/treatment results reviewed;risks/benefits reviewed;participants included;participants voiced agreement with care plan;current/potential barriers reviewed;care plan/treatment goals reviewed;patient   PT Discharge Planning   PT Discharge Recommendation (DC Rec) home;home with home care physical therapy   PT Rationale for DC Rec Pt & PT agree home with home PT is appropriate if she uses her walker. Rehab recommended given 4+ L MMT with motor dyscontrol with knee instability functionally.   PT Brief overview of current status CGA no AD but Kayli with RW.   Therapy Certification   Start of care date 08/23/22   Certification date from 08/23/22   Certification date to 08/30/22   Medical Diagnosis TIA, syncope   Total Evaluation Time   Total  Evaluation Time (Minutes) 15   Physical Therapy Goals   PT Frequency 5x/week   PT Predicted Duration/Target Date for Goal Attainment 08/30/22   PT Goals Gait;PT Goal 1   PT: Gait Modified independent;Greater than 200 feet  (least restrictive device without signs of uncontrolled knee buckling.)   PT: Goal 1 Pt will score >= 20 on the Tinetti to be a low falls risk, OR have AD/safe mobility recs for safe DC home.

## 2022-08-23 NOTE — PROGRESS NOTES
PRIMARY DIAGNOSIS: SYNCOPE/TIA  OUTPATIENT/OBSERVATION GOALS TO BE MET BEFORE DISCHARGE:  1. Orthostatic performed: Yes:          Lying Orthostatic BP: 138/58         Sitting Orthostatic BP: 120/66     2. Diagnostic testing complete & at baseline neurologic testing: No    3. Cleared by consultants (if involved): No    4. Interpretation of cardiac rhythm per telemetry tech: Sinus bradycardia    5. Tolerating adequate PO diet and medications: Yes    6. Return to near baseline physical activity or neurologic status: Yes    Discharge Planner Nurse   Safe discharge environment identified: No  Barriers to discharge: 24 hr EEG, CT, PT/OT consult       Entered by: Saba Melton RN 08/23/2022 1:51 AM     Please review provider order for any additional goals.   Nurse to notify provider when observation goals have been met and patient is ready for discharge.

## 2022-08-23 NOTE — PROGRESS NOTES
EEG CLINICAL NEUROPHYSIOLOGY PRELIMINARY REPORT    First four hours of video-EEG reviewed. Ten Hz posterior dominant rhythm with eyes closed. Desynchronized with active lambda with eyes open. No clear focal slowing. Thus far no epileptiform discharges or seizures, no spells marked.    Normal study thus far. No epileptiform discharges or seizures. Full report to follow.    Prakash Davila MD  Contact information for physicians covering Epilepsy and EEG is available on [facility].  Click search, enter neurology adult/[facility] in group name box, click on neurology adult/[facility], then click Staff Epilepsy and EEG.

## 2022-08-23 NOTE — PROGRESS NOTES
[facility]      OUTPATIENT PHYSICAL THERAPY EVALUATION  PLAN OF TREATMENT FOR OUTPATIENT REHABILITATION  (COMPLETE FOR INITIAL CLAIMS ONLY)  Patient's Last Name, First Name, M.I.  YOB: 1940  Leesa Jacinto                        Provider's Name  [facility] Medical Record No.  [id number]                               Onset Date:  08/21/22   Start of Care Date:  08/23/22      Type:     _X_PT   ___OT   ___SLP Medical Diagnosis:  TIA, syncope                        PT Diagnosis:  Impaired mobility   Visits from SOC:  1   _________________________________________________________________________________  Plan of Treatment/Functional Goals    Planned Interventions: balance training, bed mobility training, gait training, home exercise program, motor coordination training, neuromuscular re-education, patient/family education, postural re-education, stair training, strengthening, transfer training, progressive activity/exercise, risk factor education, home program guidelines     Goals: See Physical Therapy Goals on Care Plan in [facility] electronic health record.    Therapy Frequency: 5x/week  Predicted Duration of Therapy Intervention: 08/30/22  _________________________________________________________________________________    I CERTIFY THE NEED FOR THESE SERVICES FURNISHED UNDER        THIS PLAN OF TREATMENT AND WHILE UNDER MY CARE     (Physician co-signature of this document indicates review and certification of the therapy plan).              Certification date from: 08/23/22, Certification date to: 08/30/22    Referring Physician: Michele Vargas DO            Initial Assessment        See Physical Therapy evaluation dated 08/23/22 in Epic electronic health record.

## 2022-08-23 NOTE — PLAN OF CARE
Pt here with TIA as an observation patient. A&O. Neuros intact ex generalized weakness. Reports that L leg candace & shakes occasionally. No episodes of this tonight. VSS on RA. Positive for orthostatic hypotension from sit to stand. Tele sinus bradycardia. Regular diet, thin liquids. Takes pills whole. Up with Ax1 GB/W. Denies pain. Given melatonin for sleep. CT completed. Pt scoring green on the Aggression Stop Light Tool. Plan for PT/OT consults & initiating 24 hr EEG. Discharge pending workup.

## 2022-08-24 VITALS
WEIGHT: 101.8 LBS | DIASTOLIC BLOOD PRESSURE: 52 MMHG | HEART RATE: 74 BPM | HEIGHT: 56 IN | OXYGEN SATURATION: 94 % | TEMPERATURE: 97.6 F | SYSTOLIC BLOOD PRESSURE: 127 MMHG | BODY MASS INDEX: 22.9 KG/M2 | RESPIRATION RATE: 16 BRPM

## 2022-08-24 PROCEDURE — 250N000013 HC RX MED GY IP 250 OP 250 PS 637: Performed by: PHYSICIAN ASSISTANT

## 2022-08-24 PROCEDURE — 99233 SBSQ HOSP IP/OBS HIGH 50: CPT | Mod: FS | Performed by: PSYCHIATRY & NEUROLOGY

## 2022-08-24 PROCEDURE — 99356 PR PROLONGED SERV,INPATIENT,1ST HR: CPT | Mod: FS | Performed by: PSYCHIATRY & NEUROLOGY

## 2022-08-24 PROCEDURE — 99239 HOSP IP/OBS DSCHRG MGMT >30: CPT | Performed by: HOSPITALIST

## 2022-08-24 PROCEDURE — 250N000013 HC RX MED GY IP 250 OP 250 PS 637: Performed by: INTERNAL MEDICINE

## 2022-08-24 RX ORDER — CILOSTAZOL 50 MG/1
50 TABLET ORAL 2 TIMES DAILY
Qty: 60 TABLET | Refills: 0 | Status: SHIPPED | OUTPATIENT
Start: 2022-08-24 | End: 2022-08-24

## 2022-08-24 RX ORDER — CILOSTAZOL 100 MG/1
100 TABLET ORAL 2 TIMES DAILY
Status: DISCONTINUED | OUTPATIENT
Start: 2022-08-24 | End: 2022-08-24 | Stop reason: HOSPADM

## 2022-08-24 RX ORDER — CILOSTAZOL 100 MG/1
100 TABLET ORAL 2 TIMES DAILY
Qty: 60 TABLET | Refills: 0 | Status: SHIPPED | OUTPATIENT
Start: 2022-08-24 | End: 2023-07-14

## 2022-08-24 RX ORDER — CILOSTAZOL 50 MG/1
50 TABLET ORAL 2 TIMES DAILY
Status: DISCONTINUED | OUTPATIENT
Start: 2022-08-24 | End: 2022-08-24

## 2022-08-24 RX ADMIN — ATORVASTATIN CALCIUM 20 MG: 20 TABLET, FILM COATED ORAL at 10:41

## 2022-08-24 RX ADMIN — APIXABAN 2.5 MG: 2.5 TABLET, FILM COATED ORAL at 10:41

## 2022-08-24 ASSESSMENT — ACTIVITIES OF DAILY LIVING (ADL)
ADLS_ACUITY_SCORE: 24
ADLS_ACUITY_SCORE: 30
ADLS_ACUITY_SCORE: 24
ADLS_ACUITY_SCORE: 30
ADLS_ACUITY_SCORE: 30
ADLS_ACUITY_SCORE: 24
ADLS_ACUITY_SCORE: 30
ADLS_ACUITY_SCORE: 30
ADLS_ACUITY_SCORE: 24

## 2022-08-24 NOTE — PROGRESS NOTES
Care Management Follow Up    Length of Stay (days): 1    Expected Discharge Date: 08/25/2022     Concerns to be Addressed:       Patient plan of care discussed at interdisciplinary rounds: Yes    Anticipated Discharge Disposition: Home, Home Care     Anticipated Discharge Services: None  Anticipated Discharge DME: None    Patient/family educated on Medicare website which has current facility and service quality ratings:    Education Provided on the Discharge Plan:    Patient/Family in Agreement with the Plan: yes    Referrals Placed by CM/SW: Homecare  Private pay costs discussed: Not applicable    Additional Information:  Spoke to Amelia with [facility].  Referral was received and they accept patient for PT and OT.  Orders to be faxed at discharge.    Addendum @ 0318:  After discussion with RN, patient would benefit from HHC RN.  Contacted [facility] and they do not have nursing available.  Contacted [facility].  They have availability.  Referral faxed to [phone] for review of insurance.  Care Management Discharge Note    Discharge Date: 08/24/2022       Discharge Disposition: Home, Home Care    Discharge Services: None    Discharge DME: None    Discharge Transportation: family or friend will provide    Private pay costs discussed: no    PAS Confirmation Code:    Patient/family educated on Medicare website which has current facility and service quality ratings:      Education Provided on the Discharge Plan:    Persons Notified of Discharge Plans: RN, patient  Patient/Family in Agreement with the Plan: yes    Handoff Referral Completed: No    Additional Information:    Addendum @ 5731:  Had discussed if there was a need for C RN with Dr. Santos and that had C available for PT and OT.  Only PT and OT were ordered.  Spoke to patient.  She did not feel she will need HHC RN.  She even doubted if OT needed, but would consider after PT sees her for start of care.  Recommended she have her son help her set up meds  if anything new and she agreed to this.    [facility] orders faxed to [facility].    Noa Fuentes RN, BSN, PHN  Inpatient Care Coordination  [facility]  Phone: [phone]

## 2022-08-24 NOTE — PLAN OF CARE
Goal Outcome Evaluation:      Pt here with L side weakness and numbness. A&O x4. Neuros intact. VSS. Tele NS. reg diet, thin liquids. Takes pills whole with water. Up with A1 GB W. Denies pain. Pt scoring green on the Aggression Stop Light Tool. Discharge to home possibly later today after home care referral is in place.

## 2022-08-24 NOTE — PROGRESS NOTES
"      [facility]    Stroke Progress Note    Interval EventsDenies recurrent episodes of left leg shaking. or \"giving out\" overnight. EEG without evidence of seizure.     HPI Summary  82 year old female with hx of atrial fibrillation on Eliquis, osteoarthritis, elevated BP without diagnosis of hypertension, idiopathic peripheral neuropathy takes gabapentin, episodic transient tic-type left upper extremity movements, prior PRISCA territory stroke, hypothyroidism. She is also on PTA ASA 81 mg daily and Lipitor 20 mg daily.      She presented to [facility] ED 8/21/22 with L arm and leg weakness and numbness. She reports intermittent leg symptoms symptoms for approximately 1 week prior to arrival, the arm symptoms were new. CT/CTA showed old L frontal infarcts and multiple areas of intracranial atherosclerotic stenoses mild to severe (specifically b/l MCA and PRISCA). Her MRI was negative for acute stroke. On exam there is continued weakness to LLE, she says L leg has been \"buckling\" for about a week. While inpatient, she had an episode of recurrent sharp pain to her L arm and some worsening of the weakness. It resolved after a few minutes. We had her get up to stand and as she stood up she had recurrent event of left lower extremity \"buckling\"/shaking.    TIA Evaluation Summarized     MRI and/or Head CT  CT perfusion: CBF/CBV symmetric, elevated MTT and TTD in the R PRISCA and MCA distributions.   MRI: negative for acute stroke, showed indeterminate 10 mm sclerotic focus C2 vertebral body, mild chronic SVID, chronic L frontal lobe infarcts   CTH: L frontal lobe multiple patchy age-indeterminate infarcts, L maxillary alveolar ridge partially visualized punctate radiopaque foreign body   Intracranial Vasculature CTA brain: b/l carotid siphons mild stenosis from calcified plaque, b/l M2 multiple stenoses ranging from mild to severe, b/l A2/3 multiple stenosises mild to severe, R supraclinoid ICA 2 mm infundibulum " "versus small aneurysm   Cervical Vasculature CTA neck: unremarkable      Echocardiogram TTE: EF 55%. Grade I or early diastolic dysfunction. no wma,LA mildly dilated. Right atrial normal. no  atrial shunt seen, SR   EKG/Telemetry SR, nonspecific ST abnormality   Other Testing  cEEG: Pending      LDL 8/22/22: 57   A1C 8/21/2022: 5.3 %      Impression   Episodic MILLY/LLE weakness/numbness/shaking - Noted to have has significant intracranial atherosclerosis which includes b/l MCA/PRISCA mild to severe stenoses, as well as atrial fibrillation on therapeutic anticoagulation. Concern for possible MRI negative stroke vs limb shaking TIA      L arm recurrent pain of unclear etiology reported as far back as 2019, history of painful idiopathic peripheral neuropathy     Indeterminate 10 mm sclerotic focus C2 vertebral body     B/l carotid siphons mild stenosis from calcified plaque     Plan  - Continue PTA Eliquis. Recommend discontinuation of aspirin and initiation of cilastazol 100 mg BID in addition to Eliquis. Discussed that she continues to be at a slightly increased risk of bleeding on these two agents and she acknowledges understanding.  - Continue PTA Lipitor 20 mg daily with goal LDL 40-70, LDL <40 increases risk of intracranial hemorrhage  - BP goal 120-180, avoid hypotension, if recurrent symptoms then she should lay flat and check blood pressure. Discussed that she should take AM and PM home BP and keep a log for PCP  - Blood glucose monitoring, Hgb A1c 5.3%, (goal <7% for secondary stroke prevention)  - Therapies     Patient Follow-up    - in the next 1-2 week(s) with PCP  - in 6-8 weeks with [facility] (Dr. Branham, neurology referral order placed)     No further stroke evaluation is recommended, so we will sign off. Please contact us with any additional questions.    TANYA Jones, CNP  Neurology  To page me or covering stroke neurology team member, click here: [facility]   Choose \"On Call\" tab at " "top, then search dropdown box for \"Neurology Adult\", select location, press Enter, then look for stroke/neuro ICU/telestroke.    ______________________________________________________    Clinically Significant Risk Factors Present on Admission              # Coagulation Defect: home medication list includes an anticoagulant medication       Medications   Scheduled Meds    apixaban ANTICOAGULANT  2.5 mg Oral BID     atorvastatin  20 mg Oral Daily     cilostazol  100 mg Oral BID     FLUoxetine  40 mg Oral QPM     gabapentin  300 mg Oral At Bedtime     levothyroxine  50 mcg Oral QPM     sodium chloride (PF)  3 mL Intracatheter Q8H       Infusion Meds    - MEDICATION INSTRUCTIONS -       - MEDICATION INSTRUCTIONS -       - MEDICATION INSTRUCTIONS -         PRN Meds  acetaminophen, labetalol **OR** hydrALAZINE, lidocaine 4%, lidocaine (buffered or not buffered), - MEDICATION INSTRUCTIONS -, - MEDICATION INSTRUCTIONS -, melatonin, ondansetron **OR** ondansetron, - MEDICATION INSTRUCTIONS -, polyethylene glycol-propylene glycol PF, prochlorperazine **OR** prochlorperazine **OR** prochlorperazine, sodium chloride (PF)       PHYSICAL EXAMINATION  Temp:  [97.6  F (36.4  C)-98.2  F (36.8  C)] 97.6  F (36.4  C)  Pulse:  [64-74] 74  Resp:  [14-16] 16  BP: (127-142)/(52-71) 127/52  SpO2:  [94 %-96 %] 94 %      Neurologic  Mental Status:  alert, oriented x 3, follows commands, speech clear and fluent, naming and repetition normal  Cranial Nerves:  visual fields intact, PERRL, EOMI with normal smooth pursuit, facial movements symmetric, hearing not formally tested but intact to conversation, no dysarthria, shoulder shrug strong bilaterally, tongue protrusion midline  Motor:  normal muscle tone and bulk, no abnormal movements, able to move all limbs spontaneously, no pronator drift, mild LLE drift (doesn't hit bed)  Reflexes:  toes down-going  Sensory:  light touch sensation intact and symmetric throughout upper and lower " extremities, no extinction on double simultaneous stimulation   Coordination:  FTN without dysmetria  Station/Gait:  deferred    Stroke Scales    NIHSS  1a. Level of Consciousness 0-->Alert, keenly responsive   1b. LOC Questions 0-->Answers both questions correctly   1c. LOC Commands 0-->Performs both tasks correctly   2.   Best Gaze 0-->Normal   3.   Visual 0-->No visual loss   4.   Facial Palsy 0-->Normal symmetrical movements   5a. Motor Arm, Left 0-->No drift, limb holds 90 (or 45) degrees for full 10 secs   5b. Motor Arm, Right 0-->No drift, limb holds 90 (or 45) degrees for full 10 secs   6a. Motor Leg, Left 1-->Drift, leg falls by the end of the 5-sec period but does not hit bed   6b. Motor Leg, right 0-->No drift, leg holds 30 degree position for full 5 secs   7.   Limb Ataxia 0-->Absent   8.   Sensory 0-->Normal, no sensory loss   9.   Best Language 0-->No aphasia, normal   10. Dysarthria 0-->Normal   11. Extinction and Inattention  0-->No abnormality   Total 1 (08/23/22 1550)       Modified Birdie Score (Pre-morbid)  0 - No symptoms.    Imaging  I personally reviewed all imaging; relevant findings per HPI.     Lab Results Data   CBC  Recent Labs   Lab 08/22/22 0722 08/21/22 1929   WBC 6.1 6.6   RBC 3.98 4.24   HGB 12.2 13.2   HCT 36.8 38.6    217     Basic Metabolic Panel    Recent Labs   Lab 08/23/22  0817 08/22/22  2213 08/22/22  1155 08/22/22  0737 08/22/22  0722 08/22/22  0200 08/21/22 1929   NA  --   --   --   --  140  --  141   POTASSIUM  --   --   --   --  4.1  --  3.8   CHLORIDE  --   --   --   --  111*  --  108   CO2  --   --   --   --  24  --  25   BUN  --   --   --   --  27  --  37*   CR  --   --   --   --  0.90  --  0.96   GLC 96 86 92   < > 85   < > 101*   DEBI  --   --   --   --  8.4*  --  9.2    < > = values in this interval not displayed.     Liver Panel  No results for input(s): PROTTOTAL, ALBUMIN, BILITOTAL, ALKPHOS, AST, ALT, BILIDIRECT in the last 168 hours.  INR    Recent Labs    Lab Test 08/21/22 1929   INR 1.12      Lipid Profile    Recent Labs   Lab Test 08/22/22  0722   CHOL 139   HDL 74   LDL 57   TRIG 40     A1C    Recent Labs   Lab Test 08/21/22 1929   A1C 5.3     Troponin I    Recent Labs   Lab 08/21/22 1929   TROPONINIS 4        Billing: I have personally spent a total of 50 minutes providing care today, time spent in reviewing medical records and reviewing tests, examining the patient and obtaining history, coordination of care, and discussion with the patient and/or family regarding diagnostic results, prognosis, symptom management, risks and benefits of management options, and development of plan of care. Greater than 50% was spent in counseling and coordination of care.

## 2022-08-24 NOTE — PROGRESS NOTES
Pt is ready for discharge . Discharge education and follow-ups appointments explained to the patient. All patient belongings and medications are sent with the patient. Pt is leaving with the family. John Singer RN on 8/24/2022 at 5:03 PM

## 2022-08-24 NOTE — DISCHARGE SUMMARY
[facility]  Hospitalist Discharge Summary      Date of Admission:  8/21/2022  Date of Discharge:  8/24/2022  Discharging Provider: Micheel Santos MD  Discharge Service: Hospitalist Service    Discharge Diagnoses   Left-sided arm pain and leg weakness-improving  Intracranial atherosclerosis which includes b/l MCA/PRISCA mild to severe stenoses  Paroxysmal atrial fibrillation  Depression  Idiopathic peripheral neuropathy  Hypothyroidism  2 mm R ICA infundibulum v aneurysm   Indeterminate 10 mm sclerotic focus within the C2 vertebral body      Follow-ups Needed After Discharge   Follow-up Appointments     Follow-up and recommended labs and tests       PCP in 1-2 weeks for hospitalization follow up   Stroke Neurology  Neurosurgery             Unresulted Labs Ordered in the Past 30 Days of this Admission     No orders found from 7/22/2022 to 8/22/2022.      These results will be followed up by NA    Discharge Disposition   Discharged to home with home health care and family support  Condition at discharge: Stable      Hospital Course   Ms. Jacinto is an 82-year-old female with a past medical history significant for paroxysmal atrial fibrillation, stroke, hypothyroidism, who presents to the Emergency Department with concerns for left sided arm and leg weakness.     Left-sided arm pain and leg weakness  Intracranial atherosclerosis which includes b/l MCA/PRISCA mild to severe stenoses  Prior (nonacute) PRISCA territory stroke  Bilateral carotid siphons mild stenosis from calcified plaque  The patient does have a history of stroke and states her symptoms previously were also on the left side and her symptoms today are very reminiscent of her stroke prior.  CT does show left frontal lobe, age indeterminate infarcts, but she does have an MRI from prior that shows left sided infarcts as well.  This would not necessarily fit with her symptoms today.  Her symptoms now have improved, though she still has  some left leg weakness.  - Neurology following, awaiting final recs - appreciate assistance  - PT/OT   - Neuro rec continuing apixaban and aspirin (discharging with aspirin replaced with cilostazol   - A1c 5.3%  - LDL 57, atorvastatin 20 mg daily continued  - echo with normal EF, fairly unremarkable  - 8/22 - CT perfusion showed delayed perfusion of R PRISCA and R MCA distributions, neuro reports b/l MCA/PRISCA atherosclerosis -  Mild to severe stenoses. There is concern of possible MRI negative stroke vs limb shaking TIA per neuro notes. Neuro follow up as below.  --- 24 hr eeg yield no epileptiform waves, formal report awaited  - 8/23 stable/slightly improved LLE weakness during my encounter midday  - 8/24 continued improvement, neuro ok with discharge home with HH PT OT and family/friends support. EEG completed, neuro and neurosurgery follow up as outpatient as below. Patient feeling well and improved enough to discharge home with family and home health therapy.      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    Paroxysmal atrial fibrillation  She is currently in sinus rhythm.       Depression  Continue with Prozac.     Idiopathic peripheral neuropathy  Continue with Neurontin.     Hypothyroidism  Continue with levothyroxine.         Consultations This Hospital Stay   PATIENT LEARNING CENTER IP CONSULT  NEUROLOGY IP STROKE CONSULT  SPEECH LANGUAGE PATH ADULT IP CONSULT  PHARMACY IP CONSULT  PHARMACY IP CONSULT  PHARMACY IP CONSULT  PHYSICAL THERAPY ADULT IP CONSULT  OCCUPATIONAL THERAPY ADULT IP CONSULT  REHAB ADMISSIONS LIAISON IP CONSULT  CARE MANAGEMENT / SOCIAL WORK IP CONSULT  CARE MANAGEMENT / SOCIAL WORK IP CONSULT  SMOKING CESSATION PROGRAM IP CONSULT    Code Status   No CPR- Do NOT Intubate    Time Spent on this  Encounter   I, Michele Santos MD, personally saw the patient today and spent greater than 30 minutes discharging this patient.       Michele Santos MD  [facility] NEUROSCIENCE UNIT  [address] [place] [address]  Phone: [phone]  ______________________________________________________________________    Physical Exam   Vital Signs: Temp: 97.6  F (36.4  C) Temp src: Oral BP: 127/52 Pulse: 74   Resp: 16 SpO2: 94 % O2 Device: None (Room air)    Weight: 101 lbs 12.8 oz    Gen: NAD, pleasant  HEENT: EOMI, MMM  Resp: no crackles,  no wheezes, no increased work of resp  CV: S1S2 heard, reg rhythm, reg rate  Abdo: soft, nontender, nondistended, bowel sounds present  Ext: calves nontender, well perfused  Neuro: aa, conversing WNL, BUE strength/ROM WNL for age and arthritis, LLE still weak but improved daily since adm, now able to lift all the way off bed, CN grossly intact, no facial asymmetry         Primary Care Physician   Carlos Kim    Discharge Orders      Home Care Referral      Neurosurgery Referral      Follow-up and recommended labs and tests     PCP in 1-2 weeks for hospitalization follow up   Stroke Neurology  Neurosurgery     Activity    Your activity upon discharge: activity as tolerated with assist device     Reason for your hospital stay    Left side weakness/numbness     Diet    Follow this diet upon discharge: Orders Placed This Encounter      Regular Diet Adult     Stroke Hospital Follow Up    Schedule with Dr. Branham in resident clinic  [facility] will call you to coordinate care as prescribed by your provider. If you don t hear from a representative within 2 business days, please call [phone].         Significant Results and Procedures   Most Recent 3 CBC's:Recent Labs   Lab Test 08/22/22  0722 08/21/22  1929 01/15/18  1110   WBC 6.1 6.6 7.5   HGB 12.2 13.2 9.6*   MCV 93 91 98    217 338     Most Recent 3 BMP's:Recent Labs   Lab Test  08/23/22  0817 08/22/22  2213 08/22/22  1155 08/22/22  0737 08/22/22  0722 08/22/22  0200 08/21/22  1929 01/15/18  1110   NA  --   --   --   --  140  --  141 143   POTASSIUM  --   --   --   --  4.1  --  3.8 4.8   CHLORIDE  --   --   --   --  111*  --  108 110*   CO2  --   --   --   --  24  --  25 27   BUN  --   --   --   --  27  --  37* 20   CR  --   --   --   --  0.90  --  0.96 0.77   ANIONGAP  --   --   --   --  5  --  8 6   DEBI  --   --   --   --  8.4*  --  9.2 8.0*   GLC 96 86 92   < > 85   < > 101* 70    < > = values in this interval not displayed.     Most Recent 2 LFT's:No lab results found.  Most Recent 3 INR's:Recent Labs   Lab Test 08/21/22 1929   INR 1.12     Most Recent 3 Troponin's:No lab results found.  Most Recent 3 BNP's:No lab results found.  Most Recent Cholesterol Panel:Recent Labs   Lab Test 08/22/22 0722   CHOL 139   LDL 57   HDL 74   TRIG 40     Most Recent TSH and T4:No lab results found.  Most Recent Hemoglobin A1c:Recent Labs   Lab Test 08/21/22 1929   A1C 5.3     Most Recent Urinalysis:No lab results found.  Most Recent ESR & CRP:No lab results found.,   Results for orders placed or performed during the hospital encounter of 08/21/22   CTA Head Neck w Contrast    Narrative    EXAM: CTA HEAD NECK W CONTRAST  LOCATION: [facility]  DATE/TIME: 8/21/2022 7:48 PM    INDICATION: Altered mental status/confusion. Possible stroke. Numbness.  COMPARISON: None.  CONTRAST: 75mL Isovue 370  TECHNIQUE: Head and neck CT angiogram with IV contrast. Axial helical CT images of the head and neck vessels obtained during the arterial phase of intravenous contrast administration. Axial 2D reconstructed images and multiplanar 3D MIP reconstructed   images of the head and neck vessels were performed by the technologist. Dose reduction techniques were used. All stenosis measurements made according to NASCET criteria unless otherwise specified.    FINDINGS:   HEAD CTA:  Bilateral carotid  siphons demonstrate mild stenoses secondary to calcified plaque.    Right M2 segments demonstrate multiple stenoses ranging from mild to severe.    Bilateral A2/A3 segments with history multiple stenoses ranging from mild to severe.    Left M2 segments demonstrate multiple stenoses ranging from mild to severe.    No additional significant stenosis/occlusion.      Right supraclinoid ICA demonstrates a small 2 mm inferior outpouching may reflect infundibulum with poorly visualized apical artery or small aneurysm.     Otherwise, no brain aneurysm. No AVM/AVF.    Standard [place] of [place] anatomy.     Dominant right and smaller left vertebral artery contribute to a normal basilar artery.     DURAL VENOUS SINUSES: Not well evaluated on a technical basis.      NECK CTA:  RIGHT CAROTID: No significant stenosis/occlusion. No dissection.    LEFT CAROTID: No significant stenosis/occlusion. No dissection.    VERTEBRAL ARTERIES:  No significant stenosis/occlusion. No dissection.      Balanced vertebral arteries.    AORTIC ARCH: Classic aortic arch anatomy with no significant stenosis at the origin of the great vessels.    ARTERIAL PLAQUE: Calcified/noncalcified plaque aorta, left subclavian artery, bilateral carotid bifurcations/bulbs, bilateral carotid siphons, bilateral V4 segments.    NONVASCULAR STRUCTURES:   Scattered foci of air within the venous system nonspecific likely iatrogenic. Thyroid nodules largest measuring 0.8 cm. Diffuse bony demineralization. Thoracic spine noted multiple mild compression deformities. Degenerative changes noted within the   spine.        Impression    IMPRESSION:   CTA BRAIN:  1.  No intracranial arterial large vessel occlusion.    2.  Bilateral carotid siphons demonstrate mild stenoses secondary to calcified plaque.    3.  Bilateral M2 segments demonstrate multiple stenoses ranging from mild to severe.    4.  Bilateral A2/A3 segments with history multiple stenoses ranging from mild to  severe.    5.  Right supraclinoid ICA demonstrates a small 2 mm inferior outpouching may reflect infundibulum with poorly visualized apical artery or small aneurysm.     CTA NECK:  No significant stenosis, occlusion, dissection.      Dr Hang Farias was notified by Dr Tor Ramírez at  9:00 PM 08/21/2022.   CT Head w/o Contrast    Narrative    EXAM: CT HEAD W/O CONTRAST  LOCATION: [facility]  DATE/TIME: 8/21/2022 7:47 PM    INDICATION: code stroke, confusion, altered mental status, numbness  COMPARISON: None.  TECHNIQUE: Routine CT Head without IV contrast. Multiplanar reformats. Dose reduction techniques were used.    FINDINGS:  INTRACRANIAL CONTENTS: No intracranial hemorrhage, extraaxial collection, or mass effect.  Left frontal lobe white matter demonstrates multiple small patchy age-indeterminate infarcts. Otherwise, no CT evidence of acute infarct. Mild presumed chronic   small vessel ischemic changes. Mild generalized volume loss. No hydrocephalus.     VISUALIZED ORBITS/SINUSES/MASTOIDS: No intraorbital abnormality. No paranasal sinus mucosal disease. Right mastoid cells essentially clear. Left mastoid cells mild to moderate patchy opacification.    BONES/SOFT TISSUES: No acute abnormality. Left greater then right TMJ degenerative changes. Anterior to the left maxillary alveolar ridge is a partially visualized punctate radiopaque foreign body.      Impression    IMPRESSION:  1.  No acute intracranial hemorrhage.  2.  Left frontal lobe white matter demonstrates multiple small patchy age-indeterminate infarcts.   3.  Otherwise, no CT evidence of acute infarct.   4.  Age related changes described above.  5.  Anterior to the left maxillary alveolar ridge is a partially visualized punctate radiopaque foreign body.      Dr Hang Farias was notified by Dr Tor Ramírez at  8:40 PM 08/21/2022.    There was a delay in notifying radiology regarding the code stroke.   MR Brain w/o & w  Contrast    Narrative    EXAM: MR BRAIN W/O and W CONTRAST  LOCATION: [facility]  DATE/TIME: 8/21/2022 10:59 PM    INDICATION: Stroke, negative CT, left leg and arm weakness.  COMPARISON: CT same day.  CONTRAST: 4 mL Gadavist   TECHNIQUE: Routine multiplanar multisequence head MRI without and with intravenous contrast.    FINDINGS:  INTRACRANIAL CONTENTS: No acute or subacute infarct. No mass, acute hemorrhage, or extra-axial fluid collections. Patchy nonspecific T2/FLAIR hyperintensities within the cerebral white matter most consistent with mild to moderate chronic microvascular   ischemic change. Mild to moderate generalized cerebral atrophy. No hydrocephalus. Multiple chronic small white matter infarcts in the left frontal lobe. No pathologic contrast enhancement.    SELLA: No abnormality accounting for technique.    OSSEOUS STRUCTURES/SOFT TISSUES: 10 mm sclerotic focus within the C2 vertebral body. Remainder of the bone marrow pattern is normal. The major intracranial vascular flow voids are maintained.     ORBITS: No abnormality accounting for technique.     SINUSES/MASTOIDS: No paranasal sinus mucosal disease. Scattered fluid/membrane thickening in the left mastoid air cells. No apparent mass in the posterior nasopharynx or skull base.       Impression    IMPRESSION:  1.  No recent infarct, intracranial mass, abnormal enhancement or evidence of intracranial hemorrhage.  2.  Mild to moderate presumed chronic small vessel ischemic changes and generalized volume loss.  3.  Small chronic white matter infarcts in the left frontal lobe.  4.  Indeterminate 10 mm sclerotic focus within the C2 vertebral body.   CT Head Perfusion w Contrast    Narrative    EXAM: CT HEAD PERFUSION W CONTRAST  LOCATION: [facility]  DATE/TIME: 8/22/2022 9:47 PM    INDICATION: Recurrent left-sided weakness, shaking, buckling; evaluate for hypoperfusion given intracranial  stenoses.  COMPARISON: None.  TECHNIQUE: CT cerebral perfusion was performed. Perfusion data were post processed with generation of standard perfusion maps and estimation of ischemic/infarcted volumes utilizing standard threshold values. Dose reduction techniques were used.   CONTRAST: 50 mL Isovue 370  COMPARISON: None.    FINDINGS:     CT PERFUSION:  PERFUSION MAPS: Cerebral blood volume and cerebral blood flow maps are symmetric and unremarkable. Multifocal areas of increased mean transit time (MTT) and time-to-delay (TTD) are noted, most evident along the parasagittal posterior right frontal and   ventral parasagittal right parietal lobe with corresponding elevated time to maximum (Tmax). Elevated MTT and TTD are also seen along the superior lateral aspects of the right frontal lobe. No corresponding acute ischemia was seen in these locations on   prior MRI brain.      Impression    IMPRESSION:   1. Cerebral blood flow and cerebral blood volume maps are symmetric and otherwise unremarkable.  2. Multifocal areas of elevated TTD and TTD are seen. These areas of elevation are most evident along the parasagittal posterior right frontal and ventral right parietal lobes where there is also Tmax. These findings are compatible with delayed perfusion   of the posterior right PRISCA distribution. Elevated TTD and MTT are also seen along the superior lateral aspect of the right frontal and parietal lobes (right MCA distribution).    Findings discussed with Nurse Practioner Ran Quiñones at approximately 10:10 PM.   Echocardiogram Complete - For age > 60 yrs     Value    LVEF  55%    Narrative    [id number]  [id number]  [id number]  [id number]^MIQUEL^AMOS     [facility]  Echocardiography Laboratory  [address] [place] [id number]     Name: PILAR MANDEL  MRN: [id number]  : 1940  Study Date: 2022 10:28 AM  Age: 82 yrs  Gender: Female  Patient Location: [facility]  Reason For Study:  Cerebrovascular Incident  Ordering Physician: AMOS LAFLEUR  Referring Physician: AMOS LAFLEUR  Performed By: Micah Lamb MARI     BSA: 1.3 m2  Height: 56 in  Weight: 101 lb  HR: 77  BP: 138/56 mmHg  ______________________________________________________________________________  Procedure  Complete Portable Echo Adult.  ______________________________________________________________________________  Interpretation Summary     1. The left ventricle is normal in structure, function and size. The visual  ejection fraction is estimated at 55%.  2. The right ventricle is normal in structure, function and size.  3. No valve disease.     No previous echo for comparison.  ______________________________________________________________________________  Left Ventricle  The left ventricle is normal in structure, function and size. There is normal  left ventricular wall thickness. The visual ejection fraction is estimated at  55%. Grade I or early diastolic dysfunction. Normal left ventricular wall  motion.     Right Ventricle  The right ventricle is normal in structure, function and size.     Atria  The left atrium is mildly dilated. Right atrial size is normal. There is no  atrial shunt seen.     Mitral Valve  The mitral valve is normal in structure and function.     Tricuspid Valve  There is mild (1+) tricuspid regurgitation. The right ventricular systolic  pressure is approximated at 28.9 mmHg plus the right atrial pressure.     Aortic Valve  There is mild trileaflet aortic sclerosis.     Pulmonic Valve  The pulmonic valve is normal in structure and function.     Vessels  Normal ascending, transverse (arch), and descending aorta. The inferior vena  cava was normal in size with preserved respiratory variability.     Pericardium  There is no pericardial effusion.     Rhythm  Sinus rhythm was noted.  ______________________________________________________________________________  MMode/2D Measurements & Calculations  IVSd:  1.00 cm     LVIDd: 4.1 cm  LVIDs: 2.9 cm  LVPWd: 0.92 cm  FS: 29.9 %  LV mass(C)d: 124.3 grams  LV mass(C)dI: 93.6 grams/m2  Ao root diam: 3.0 cm  LA dimension: 3.1 cm  LA/Ao: 1.0  LVOT diam: 1.8 cm  LVOT area: 2.5 cm2  LA Volume (BP): 57.0 ml  LA Volume Index (BP): 42.9 ml/m2  RWT: 0.45     Doppler Measurements & Calculations  MV E max leeanne: 64.5 cm/sec  MV A max leeanne: 46.8 cm/sec  MV E/A: 1.4  MV dec time: 0.23 sec  PA acc time: 0.11 sec  TR max leeanne: 268.8 cm/sec  TR max P.9 mmHg  E/E' avg: 10.3  Lateral E/e': 8.6  Medial E/e': 12.1     ______________________________________________________________________________  Report approved by: Mini Ortiz 2022 02:26 PM               Discharge Medications   Current Discharge Medication List      START taking these medications    Details   cilostazol (PLETAL) 100 MG tablet Take 1 tablet (100 mg) by mouth 2 times daily for 30 days  Qty: 60 tablet, Refills: 0    Associated Diagnoses: Left sided numbness         CONTINUE these medications which have NOT CHANGED    Details   apixaban ANTICOAGULANT (ELIQUIS) 2.5 MG tablet Take 2.5 mg by mouth 2 times daily      atorvastatin (LIPITOR) 20 MG tablet Take 20 mg by mouth daily      calcium carbonate 600 mg-vitamin D 400 units (CALTRATE) 600-400 MG-UNIT per tablet Take 1 tablet by mouth 2 times daily      FLUoxetine (PROZAC) 20 MG capsule Take 40 mg by mouth every evening      gabapentin (NEURONTIN) 100 MG capsule Take 300 mg by mouth At Bedtime      levothyroxine (SYNTHROID/LEVOTHROID) 50 MCG tablet Take 50 mcg by mouth every evening      multivitamin w/minerals (THERA-VIT-M) tablet Take 1 tablet by mouth daily      polyethylene glycol-propylene glycol PF (SYSTANE ULTRA PF) 0.4-0.3 % SOLN opthalmic solution Place 1 drop into both eyes 4 times daily as needed for dry eyes      vitamin D3 (CHOLECALCIFEROL) 50 mcg (2000 units) tablet Take 1 tablet by mouth every evening         STOP taking these medications        aspirin 81 MG EC tablet Comments:   Reason for Stopping:             Allergies   Allergies   Allergen Reactions     Sodium Hypochlorite Shortness Of Breath     Alendronic Acid Diarrhea     Raloxifene Other (See Comments)     RIND on 11/8/2019     Sulfa Drugs Rash     Sulfasalazine Rash       Neuro clinic f/u at their discretion  PCP for hospital follow up    HH PT OT

## 2022-08-24 NOTE — PLAN OF CARE
Pt here with TIA. A&Ox4. Neuros intact ex slight LLE weakness. VSS. Tele NSR. Regular diet, thin liquids. Takes pills whole. Up with A1 GBW. Denies pain. Pt scoring green on the Aggression Stop Light Tool. Discharge pending 24 hr EEG.

## 2022-08-24 NOTE — DISCHARGE INSTRUCTIONS
HOMECARE NOTE:   Your doctor has ordered home care to help you after your hospital stay.  The staff will contact you to schedule your first visit.  This service will be provided by [facility].  If you have any question, or have not received a call within 48 hours of discharge, please call them at [phone].    *please see homecare quality ratings for all homecares in your area at www.medicare.gov

## 2022-08-24 NOTE — PROGRESS NOTES
EEG CLINICAL NEUROPHYSIOLOGY PRELIMINARY REPORT    EEG through approximately 8:30 AM today reviewed.  10 Hz posterior dominant rhythm while awake.  Long periods of and to sleep with vertex waves and sleep spindles.  No epileptiform discharges or seizures.    EEG continues normal.  No epileptiform discharges or seizures.  Full report to follow.    Prakash Davila MD

## 2022-08-25 ENCOUNTER — PATIENT OUTREACH (OUTPATIENT)
Dept: CARE COORDINATION | Facility: CLINIC | Age: 82
End: 2022-08-25

## 2022-08-25 NOTE — PROGRESS NOTES
Physical Therapy Discharge Summary    Reason for therapy discharge:    Discharged to home with PT    Progress towards therapy goal(s). See goals on Care Plan in [facility] electronic health record for goal details.  Goals partially met.  Barriers to achieving goals:   discharge from facility.    Therapy recommendation(s):    Continued therapy is recommended.  Rationale/Recommendations:  PT for weakness, knee dyscontrol, imbalance, amplitude recalibration (PD). .

## 2022-08-25 NOTE — PROGRESS NOTES
Clinic Care Coordination Contact  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     Left-sided arm pain and leg weakness  Intracranial atherosclerosis which includes b/l MCA/PRISCA mild to severe stenoses  Prior (nonacute) PRISCA territory stroke  Bilateral carotid siphons mild stenosis from calcified plaque  The patient does have a history of stroke and states her symptoms previously were also on the left side and her symptoms today are very reminiscent of her stroke prior.  CT does show left frontal lobe, age indeterminate infarcts, but she does have an MRI from prior that shows left sided infarcts as well.  This would not necessarily fit with her symptoms today.  Her symptoms now have improved, though she still has some left leg weakness.  - Neurology following, awaiting final recs - appreciate assistance  - PT/OT   - Neuro rec continuing apixaban and aspirin (discharging with aspirin replaced with cilostazol   - A1c 5.3%  - LDL 57, atorvastatin 20 mg daily continued  - echo with normal EF, fairly unremarkable  - 8/22 - CT perfusion showed delayed perfusion of R  PRISCA and R MCA distributions, neuro reports b/l MCA/PRISCA atherosclerosis -  Mild to severe stenoses. There is concern of possible MRI negative stroke vs limb shaking TIA per neuro notes. Neuro follow up as below.  --- 24 hr eeg yield no epileptiform waves, formal report awaited  - 8/23 stable/slightly improved LLE weakness during my encounter midday  - 8/24 continued improvement, neuro ok with discharge home with HH PT OT and family/friends support. EEG completed, neuro and neurosurgery follow up as outpatient as below. Patient feeling well and improved enough to discharge home with family and home health therapy.      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     Paroxysmal atrial fibrillation  She is currently in sinus rhythm.       Depression  Continue with Prozac.     Idiopathic peripheral neuropathy  Continue with Neurontin.     Hypothyroidism  Continue with levothyroxine.             ASSESSMENT        Discharge Assessment  How are you doing now that you are home?: Doing very well  How are your symptoms? (Red Flag symptoms escalate to triage hotline per guidelines): Improved  Do you feel your condition is stable enough to be safe at home until your provider visit?: Yes  Does the patient have their discharge instructions? : Yes  Does the patient have questions regarding their discharge instructions? : No  Were you started on any new medications or were there changes to any of your previous medications? : Yes  Does the patient have all of their medications?: Yes  Do you have questions regarding any of your medications? : No  Do you have all of your needed medical supplies or equipment (DME)?  (i.e. oxygen tank, CPAP, cane, etc.): Yes  Discharge follow-up appointment scheduled within 14 calendar days? : No  Is patient  agreeable to assistance with scheduling? : No (needs transportation- will do after speaking to children)      PLAN                                                      Outpatient Plan:   Follow-up and recommended labs and tests       PCP in 1-2 weeks for hospitalization follow up   Stroke Neurology  Neurosurgery       No future appointments.      For any urgent concerns, please contact our 24 hour nurse triage line: [phone] ([phone])         Yolanda Sarabia MA

## 2022-08-29 ENCOUNTER — TELEPHONE (OUTPATIENT)
Dept: NEUROLOGY | Facility: CLINIC | Age: 82
End: 2022-08-29

## 2022-08-29 NOTE — TELEPHONE ENCOUNTER
"Called son back, and advised to contact insurance company regarding what neurologist's are covered under pt's [employer].      Also gave him [facility] contact information.      Son had a question regarding discharge recommendations.  He stated that at discharge they recommended if pt had \"shaking\" again that she go back to the hospital.  He said that patient did not want to do that, so he is wondering how important it was to be seen.    Advised that if that was recommended at discharge, that if she is shaking that she should return to the ED.  Son verbalized understanding.    Tiffani DENISE RN, BSN  [facility]    "

## 2022-08-29 NOTE — TELEPHONE ENCOUNTER
[facility] Call Center    Phone Message    May a detailed message be left on voicemail: yes     Reason for Call: Other: Patients son Russell is requesting a call back to see where the nruology referral can be sent to schedule. Patient has [employer] insurance and we dont accept this insurance anymore. Please call Russell back to advise at # [phone]     Action Taken: Message routed to:  Other: [facility]     Travel Screening: Not Applicable

## 2022-09-01 ENCOUNTER — TRANSCRIBE ORDERS (OUTPATIENT)
Dept: OTHER | Age: 82
End: 2022-09-01

## 2022-09-01 DIAGNOSIS — G45.9 TIA (TRANSIENT ISCHEMIC ATTACK): Primary | ICD-10-CM

## 2023-07-14 ENCOUNTER — APPOINTMENT (OUTPATIENT)
Dept: CT IMAGING | Facility: CLINIC | Age: 83
DRG: 551 | End: 2023-07-14
Attending: EMERGENCY MEDICINE
Payer: COMMERCIAL

## 2023-07-14 ENCOUNTER — APPOINTMENT (OUTPATIENT)
Dept: MRI IMAGING | Facility: CLINIC | Age: 83
DRG: 551 | End: 2023-07-14
Attending: EMERGENCY MEDICINE
Payer: COMMERCIAL

## 2023-07-14 ENCOUNTER — HOSPITAL ENCOUNTER (INPATIENT)
Facility: CLINIC | Age: 83
LOS: 8 days | Discharge: HOME HEALTH | DRG: 551 | End: 2023-07-25
Attending: EMERGENCY MEDICINE | Admitting: INTERNAL MEDICINE
Payer: COMMERCIAL

## 2023-07-14 DIAGNOSIS — R91.8 PULMONARY NODULES: ICD-10-CM

## 2023-07-14 DIAGNOSIS — S22.039A CLOSED FRACTURE OF THIRD THORACIC VERTEBRA, UNSPECIFIED FRACTURE MORPHOLOGY, INITIAL ENCOUNTER (H): ICD-10-CM

## 2023-07-14 LAB
ALBUMIN SERPL BCG-MCNC: 3.9 G/DL (ref 3.5–5.2)
ALP SERPL-CCNC: 71 U/L (ref 35–104)
ALT SERPL W P-5'-P-CCNC: 29 U/L (ref 0–50)
ANION GAP SERPL CALCULATED.3IONS-SCNC: 15 MMOL/L (ref 7–15)
AST SERPL W P-5'-P-CCNC: 29 U/L (ref 0–45)
ATRIAL RATE - MUSE: 81 BPM
BASOPHILS # BLD AUTO: 0 10E3/UL (ref 0–0.2)
BASOPHILS NFR BLD AUTO: 0 %
BILIRUB DIRECT SERPL-MCNC: <0.2 MG/DL (ref 0–0.3)
BILIRUB SERPL-MCNC: 0.6 MG/DL
BUN SERPL-MCNC: 20.2 MG/DL (ref 8–23)
CALCIUM SERPL-MCNC: 8.8 MG/DL (ref 8.8–10.2)
CHLORIDE SERPL-SCNC: 108 MMOL/L (ref 98–107)
CREAT SERPL-MCNC: 0.85 MG/DL (ref 0.51–0.95)
DEPRECATED HCO3 PLAS-SCNC: 18 MMOL/L (ref 22–29)
DIASTOLIC BLOOD PRESSURE - MUSE: NORMAL MMHG
EOSINOPHIL # BLD AUTO: 0 10E3/UL (ref 0–0.7)
EOSINOPHIL NFR BLD AUTO: 0 %
ERYTHROCYTE [DISTWIDTH] IN BLOOD BY AUTOMATED COUNT: 14 % (ref 10–15)
GFR SERPL CREATININE-BSD FRML MDRD: 67 ML/MIN/1.73M2
GLUCOSE SERPL-MCNC: 98 MG/DL (ref 70–99)
HCT VFR BLD AUTO: 34.5 % (ref 35–47)
HGB BLD-MCNC: 11.9 G/DL (ref 11.7–15.7)
IMM GRANULOCYTES # BLD: 0.1 10E3/UL
IMM GRANULOCYTES NFR BLD: 1 %
INTERPRETATION ECG - MUSE: NORMAL
LYMPHOCYTES # BLD AUTO: 0.7 10E3/UL (ref 0.8–5.3)
LYMPHOCYTES NFR BLD AUTO: 7 %
MAGNESIUM SERPL-MCNC: 1.9 MG/DL (ref 1.7–2.3)
MCH RBC QN AUTO: 31.2 PG (ref 26.5–33)
MCHC RBC AUTO-ENTMCNC: 34.5 G/DL (ref 31.5–36.5)
MCV RBC AUTO: 91 FL (ref 78–100)
MONOCYTES # BLD AUTO: 0.7 10E3/UL (ref 0–1.3)
MONOCYTES NFR BLD AUTO: 7 %
NEUTROPHILS # BLD AUTO: 9.2 10E3/UL (ref 1.6–8.3)
NEUTROPHILS NFR BLD AUTO: 85 %
NRBC # BLD AUTO: 0 10E3/UL
NRBC BLD AUTO-RTO: 0 /100
P AXIS - MUSE: 78 DEGREES
PLATELET # BLD AUTO: 215 10E3/UL (ref 150–450)
POTASSIUM SERPL-SCNC: 3.5 MMOL/L (ref 3.4–5.3)
PR INTERVAL - MUSE: 152 MS
PROT SERPL-MCNC: 6 G/DL (ref 6.4–8.3)
QRS DURATION - MUSE: 72 MS
QT - MUSE: 398 MS
QTC - MUSE: 462 MS
R AXIS - MUSE: 83 DEGREES
RBC # BLD AUTO: 3.81 10E6/UL (ref 3.8–5.2)
SODIUM SERPL-SCNC: 141 MMOL/L (ref 136–145)
SYSTOLIC BLOOD PRESSURE - MUSE: NORMAL MMHG
T AXIS - MUSE: 81 DEGREES
VENTRICULAR RATE- MUSE: 81 BPM
WBC # BLD AUTO: 10.7 10E3/UL (ref 4–11)

## 2023-07-14 PROCEDURE — 83735 ASSAY OF MAGNESIUM: CPT | Performed by: EMERGENCY MEDICINE

## 2023-07-14 PROCEDURE — L0456 TLSO FLEX TRNK SJ-SS PRE CST: HCPCS

## 2023-07-14 PROCEDURE — G0378 HOSPITAL OBSERVATION PER HR: HCPCS

## 2023-07-14 PROCEDURE — 250N000013 HC RX MED GY IP 250 OP 250 PS 637: Performed by: INTERNAL MEDICINE

## 2023-07-14 PROCEDURE — 96376 TX/PRO/DX INJ SAME DRUG ADON: CPT

## 2023-07-14 PROCEDURE — 99285 EMERGENCY DEPT VISIT HI MDM: CPT | Mod: 25

## 2023-07-14 PROCEDURE — 71250 CT THORAX DX C-: CPT

## 2023-07-14 PROCEDURE — 96374 THER/PROPH/DIAG INJ IV PUSH: CPT

## 2023-07-14 PROCEDURE — 250N000011 HC RX IP 250 OP 636: Performed by: EMERGENCY MEDICINE

## 2023-07-14 PROCEDURE — 999N000104 CT THORACIC SPINE RECONSTRUCTED

## 2023-07-14 PROCEDURE — 250N000011 HC RX IP 250 OP 636: Performed by: INTERNAL MEDICINE

## 2023-07-14 PROCEDURE — 96375 TX/PRO/DX INJ NEW DRUG ADDON: CPT

## 2023-07-14 PROCEDURE — 82248 BILIRUBIN DIRECT: CPT | Performed by: INTERNAL MEDICINE

## 2023-07-14 PROCEDURE — 999N000104 CT LUMBAR SPINE RECONSTRUCTED

## 2023-07-14 PROCEDURE — 80053 COMPREHEN METABOLIC PANEL: CPT | Performed by: EMERGENCY MEDICINE

## 2023-07-14 PROCEDURE — 72146 MRI CHEST SPINE W/O DYE: CPT

## 2023-07-14 PROCEDURE — 36415 COLL VENOUS BLD VENIPUNCTURE: CPT | Performed by: EMERGENCY MEDICINE

## 2023-07-14 PROCEDURE — 72125 CT NECK SPINE W/O DYE: CPT

## 2023-07-14 PROCEDURE — 99223 1ST HOSP IP/OBS HIGH 75: CPT | Performed by: INTERNAL MEDICINE

## 2023-07-14 PROCEDURE — 70450 CT HEAD/BRAIN W/O DYE: CPT

## 2023-07-14 PROCEDURE — 85025 COMPLETE CBC W/AUTO DIFF WBC: CPT | Performed by: EMERGENCY MEDICINE

## 2023-07-14 PROCEDURE — 93005 ELECTROCARDIOGRAM TRACING: CPT

## 2023-07-14 RX ORDER — BIOTIN 10 MG
TABLET ORAL DAILY
Status: DISCONTINUED | OUTPATIENT
Start: 2023-07-14 | End: 2023-07-14

## 2023-07-14 RX ORDER — FENTANYL CITRATE 50 UG/ML
25 INJECTION, SOLUTION INTRAMUSCULAR; INTRAVENOUS ONCE
Status: COMPLETED | OUTPATIENT
Start: 2023-07-14 | End: 2023-07-14

## 2023-07-14 RX ORDER — POLYETHYLENE GLYCOL 3350 17 G/17G
17 POWDER, FOR SOLUTION ORAL DAILY
Status: DISCONTINUED | OUTPATIENT
Start: 2023-07-14 | End: 2023-07-21

## 2023-07-14 RX ORDER — MULTIPLE VITAMINS W/ MINERALS TAB 9MG-400MCG
1 TAB ORAL DAILY
Status: DISCONTINUED | OUTPATIENT
Start: 2023-07-15 | End: 2023-07-25 | Stop reason: HOSPADM

## 2023-07-14 RX ORDER — CHOLECALCIFEROL (VITAMIN D3) 50 MCG
50 TABLET ORAL EVERY EVENING
Status: DISCONTINUED | OUTPATIENT
Start: 2023-07-14 | End: 2023-07-25 | Stop reason: HOSPADM

## 2023-07-14 RX ORDER — LEVOTHYROXINE SODIUM 50 UG/1
50 TABLET ORAL EVERY EVENING
Status: DISCONTINUED | OUTPATIENT
Start: 2023-07-14 | End: 2023-07-22

## 2023-07-14 RX ORDER — GABAPENTIN 300 MG/1
300 CAPSULE ORAL AT BEDTIME
Status: DISCONTINUED | OUTPATIENT
Start: 2023-07-14 | End: 2023-07-25 | Stop reason: HOSPADM

## 2023-07-14 RX ORDER — NALOXONE HYDROCHLORIDE 0.4 MG/ML
0.2 INJECTION, SOLUTION INTRAMUSCULAR; INTRAVENOUS; SUBCUTANEOUS
Status: DISCONTINUED | OUTPATIENT
Start: 2023-07-14 | End: 2023-07-25 | Stop reason: HOSPADM

## 2023-07-14 RX ORDER — ONDANSETRON 2 MG/ML
4 INJECTION INTRAMUSCULAR; INTRAVENOUS EVERY 30 MIN PRN
Status: COMPLETED | OUTPATIENT
Start: 2023-07-14 | End: 2023-07-24

## 2023-07-14 RX ORDER — LIDOCAINE 40 MG/G
CREAM TOPICAL
Status: DISCONTINUED | OUTPATIENT
Start: 2023-07-14 | End: 2023-07-25 | Stop reason: HOSPADM

## 2023-07-14 RX ORDER — AMOXICILLIN 250 MG
2 CAPSULE ORAL 2 TIMES DAILY PRN
Status: DISCONTINUED | OUTPATIENT
Start: 2023-07-14 | End: 2023-07-25 | Stop reason: HOSPADM

## 2023-07-14 RX ORDER — LIDOCAINE 4 G/G
1 PATCH TOPICAL
Status: DISCONTINUED | OUTPATIENT
Start: 2023-07-14 | End: 2023-07-25 | Stop reason: HOSPADM

## 2023-07-14 RX ORDER — AMOXICILLIN 250 MG
1 CAPSULE ORAL 2 TIMES DAILY PRN
Status: DISCONTINUED | OUTPATIENT
Start: 2023-07-14 | End: 2023-07-25 | Stop reason: HOSPADM

## 2023-07-14 RX ORDER — HYDROMORPHONE HCL IN WATER/PF 6 MG/30 ML
.2-.3 PATIENT CONTROLLED ANALGESIA SYRINGE INTRAVENOUS EVERY 4 HOURS PRN
Status: DISCONTINUED | OUTPATIENT
Start: 2023-07-14 | End: 2023-07-21

## 2023-07-14 RX ORDER — HYDROMORPHONE HYDROCHLORIDE 1 MG/ML
0.3 INJECTION, SOLUTION INTRAMUSCULAR; INTRAVENOUS; SUBCUTANEOUS ONCE
Status: COMPLETED | OUTPATIENT
Start: 2023-07-14 | End: 2023-07-14

## 2023-07-14 RX ORDER — ATORVASTATIN CALCIUM 10 MG/1
20 TABLET, FILM COATED ORAL DAILY
Status: DISCONTINUED | OUTPATIENT
Start: 2023-07-14 | End: 2023-07-25 | Stop reason: HOSPADM

## 2023-07-14 RX ORDER — SENNOSIDES 8.6 MG
8.6 TABLET ORAL 2 TIMES DAILY
Status: DISCONTINUED | OUTPATIENT
Start: 2023-07-14 | End: 2023-07-21

## 2023-07-14 RX ORDER — ACETAMINOPHEN 325 MG/1
650 TABLET ORAL EVERY 6 HOURS
Status: DISCONTINUED | OUTPATIENT
Start: 2023-07-14 | End: 2023-07-25 | Stop reason: HOSPADM

## 2023-07-14 RX ORDER — LIDOCAINE 4 G/G
1 PATCH TOPICAL
Status: DISCONTINUED | OUTPATIENT
Start: 2023-07-15 | End: 2023-07-14

## 2023-07-14 RX ORDER — NALOXONE HYDROCHLORIDE 0.4 MG/ML
0.4 INJECTION, SOLUTION INTRAMUSCULAR; INTRAVENOUS; SUBCUTANEOUS
Status: DISCONTINUED | OUTPATIENT
Start: 2023-07-14 | End: 2023-07-25 | Stop reason: HOSPADM

## 2023-07-14 RX ORDER — HYDROMORPHONE HYDROCHLORIDE 1 MG/ML
0.3 INJECTION, SOLUTION INTRAMUSCULAR; INTRAVENOUS; SUBCUTANEOUS EVERY 6 HOURS PRN
Status: DISCONTINUED | OUTPATIENT
Start: 2023-07-14 | End: 2023-07-18

## 2023-07-14 RX ORDER — CILOSTAZOL 100 MG/1
100 TABLET ORAL 2 TIMES DAILY
Status: DISCONTINUED | OUTPATIENT
Start: 2023-07-14 | End: 2023-07-25 | Stop reason: HOSPADM

## 2023-07-14 RX ORDER — CILOSTAZOL 100 MG/1
100 TABLET ORAL 2 TIMES DAILY
COMMUNITY

## 2023-07-14 RX ADMIN — LIDOCAINE 1 PATCH: 560 PATCH PERCUTANEOUS; TOPICAL; TRANSDERMAL at 21:06

## 2023-07-14 RX ADMIN — LEVOTHYROXINE SODIUM 50 MCG: 50 TABLET ORAL at 19:11

## 2023-07-14 RX ADMIN — HYDROMORPHONE HYDROCHLORIDE 0.3 MG: 1 INJECTION, SOLUTION INTRAMUSCULAR; INTRAVENOUS; SUBCUTANEOUS at 15:35

## 2023-07-14 RX ADMIN — ONDANSETRON 4 MG: 2 INJECTION INTRAMUSCULAR; INTRAVENOUS at 10:51

## 2023-07-14 RX ADMIN — Medication 50 MCG: at 19:12

## 2023-07-14 RX ADMIN — ATORVASTATIN CALCIUM 20 MG: 20 TABLET, FILM COATED ORAL at 19:11

## 2023-07-14 RX ADMIN — CILOSTAZOL 100 MG: 100 TABLET ORAL at 19:11

## 2023-07-14 RX ADMIN — ACETAMINOPHEN 650 MG: 325 TABLET, FILM COATED ORAL at 18:36

## 2023-07-14 RX ADMIN — FENTANYL CITRATE 25 MCG: 50 INJECTION, SOLUTION INTRAMUSCULAR; INTRAVENOUS at 10:51

## 2023-07-14 RX ADMIN — HYDROMORPHONE HYDROCHLORIDE 0.3 MG: 1 INJECTION, SOLUTION INTRAMUSCULAR; INTRAVENOUS; SUBCUTANEOUS at 12:57

## 2023-07-14 RX ADMIN — FLUOXETINE 40 MG: 20 CAPSULE ORAL at 19:10

## 2023-07-14 RX ADMIN — OXYCODONE HYDROCHLORIDE 5 MG: 5 TABLET ORAL at 18:36

## 2023-07-14 RX ADMIN — GABAPENTIN 300 MG: 300 CAPSULE ORAL at 21:07

## 2023-07-14 ASSESSMENT — ACTIVITIES OF DAILY LIVING (ADL)
ADLS_ACUITY_SCORE: 35
ADLS_ACUITY_SCORE: 35
ADLS_ACUITY_SCORE: 39
ADLS_ACUITY_SCORE: 35

## 2023-07-14 NOTE — ED NOTES
Bed: ED01  Expected date:   Expected time:   Means of arrival:   Comments:  521  83 F fall/on thinners/no loc

## 2023-07-14 NOTE — PROGRESS NOTES
RECEIVING UNIT ED HANDOFF REVIEW    ED Nurse Handoff Report was reviewed by: Emily Lopez RN on July 14, 2023 at 5:09 PM

## 2023-07-14 NOTE — ED NOTES
[facility]  ED Nurse Handoff Report    ED Chief complaint: Fall (Patient fell from a standing position, she was going to sit on the toilet. Lost her balance did not hit her head or loss consciousness, she complains of mid thoracic pain.  Patient is on Eliquis.)      ED Diagnosis:   Final diagnoses:   Pulmonary nodules   Closed fracture of third thoracic vertebra, unspecified fracture morphology, initial encounter (H)       Code Status: To be addressed by admission staff    Allergies:   Allergies   Allergen Reactions     Sodium Hypochlorite Shortness Of Breath     Alendronate Diarrhea     Raloxifene Other (See Comments)     RIND on 11/8/2019     Sulfa Antibiotics Rash     Sulfasalazine Rash       Patient Story: Pt comes to ED BIBA for evaluation after a fall.  Focused Assessment:  Pt lives independently and was transferring at home. Pt felt weak, felt the room get dark and fell from a standing position. Pt c/o mid thoracic vertebral pain. No LOC, did not hit her head, pt is on eloquis.  PT is alert and oriented x4. Rating pain 7/10. Fentanyl x1 given for comfort, dilaudid x1 given for comfort.     Treatments and/or interventions provided: Pain medication  Patient's response to treatments and/or interventions: Pain reduced from 9/10 to 6/10. Continue to monitor pain closely    To be done/followed up on inpatient unit:      Does this patient have any cognitive concerns?: None    Activity level - Baseline/Home:  Independent  Activity Level - Current:   Stand with assist x2    Patient's Preferred language: English   Needed?: No    Isolation: None  Infection: Not Applicable  Patient tested for COVID 19 prior to admission: NO  Bariatric?: No    Vital Signs:   Vitals:    07/14/23 1035 07/14/23 1107 07/14/23 1258   BP: 133/64 124/60    Pulse: 85 90 89   Resp: 17 12 18   SpO2: 97% 96% 97%       Cardiac Rhythm:     Was the PSS-3 completed:   Yes  What interventions are required if any?                Family Comments: Son in law assisting at bedside  OBS brochure/video discussed/provided to patient/family: N/A              Name of person given brochure if not patient: N/A              Relationship to patient: N/A    For the majority of the shift this patient's behavior was Green.   Behavioral interventions performed were N/A.    ED NURSE PHONE NUMBER: [phone]

## 2023-07-14 NOTE — DISCHARGE INSTRUCTIONS
Your CT imaging today showed incidental pulmonary nodules.  Please follow-up with your primary care doctor regarding this finding.  Please see the radiologist's recommendations for follow-up below:    Recommendations for one or multiple incidental lung nodules < 6mm:    Low risk patients: No routine follow-up.    High risk patients: Optional follow-up CT at 12 months; if  unchanged, no further follow-up.     *Low Risk: Minimal or absent history of smoking or other known risk  factors.  *Nonsolid (ground glass) or partly solid nodules may require longer  follow-up to exclude indolent adenocarcinoma.  *Recommendations based on Guidelines for the Management of Incidental  Pulmonary Nodules Detected at CT: From the Fleischner Society 2017,  Radiology 2017.

## 2023-07-14 NOTE — CONSULTS
[facility]    Neurosurgery Consultation     Date of Admission:  7/14/2023  Date of Consult (When I saw the patient): 07/14/23    Assessment & Plan   Leesa Jacinto is a 83 year old female on Eliquis with a history of PAF, cerebral artery occlusion who was admitted on 7/14/2023 following a fall at home this morning. Neurosurgery was consulted for T3 compression fracture. Radiographic findings include     CT thoracic spine showing:   IMPRESSION:  There is a recent-appearing moderate burst compression  fracture deformity of the T3 vertebral body with mild retropulsion of  bony fragments into the spinal canal. There is chronic-appearing  moderate compression fracture deformity of the T12 vertebral body.  Vertebral body heights and contours of the thoracic spine are  otherwise normal. No other recent fractures identified. No spinal  canal stenosis.    CT lumbar spine  IMPRESSION:  There is grade 1-2 degenerative anterolisthesis of L4  upon L5. There is grade 1 degenerative anterolisthesis of L5 upon S1.  Alignment otherwise normal. There is a large bony defect in the  superior aspect of the L2 vertebral body that most likely represents a  large degenerative Schmorl's node deformity, although chronic fracture  cannot be completely excluded. Vertebral body heights and contours  otherwise normal. No recent fractures. Moderate to severe facet  arthropathy bilaterally at L3-L4, L4-L5 and L5-S1. Moderate  degenerative spinal canal stenosis at L4-L5. No other significant  spinal canal narrowing of the lumbar spine.    CT head  IMPRESSION:  Diffuse cerebral volume loss and cerebral white matter  changes consistent with chronic small vessel ischemic disease. No  evidence for acute intracranial pathology    CT cervical spine  IMPRESSION: Minimal degenerative anterolisthesis of C4 upon C5 and C6  upon C7. Alignment of the cervical vertebrae is otherwise normal.  Vertebral body heights of the  "cervical spine are normal.  Craniocervical alignment is normal. There are no fractures of the  cervical spine.  No spinal canal stenosis. No prevertebral soft tissue  swelling.    Principal Problem:    Closed fracture of third thoracic vertebra, unspecified fracture morphology, initial encounter (H)       Plan:   - Thoracic MRI  - Pain control  - Hold Eliquis until results of thoracic MRI  - Regular neuro checks  - Appreciate assistance from other specialities       I have discussed the following assessment and plan with Dr. Chávez.     Zunilda Montero PA-C  [facility]  [address]  [address] [place] [address]    Tel [phone]  Pager [phone]      Code Status    No CPR- Do NOT Intubate    Reason for Consult   Reason for consult: I was asked by Dr Rojas to evaluate this patient for T3 compression fracture.    Primary Care Physician   Carlos Kim    Chief Complaint Mid thoracic back pain    History is obtained from the patient    History of Present Illness   Leesa Jacinto is a 83 year old female who presents after a fall this morning in her bathroom. She was in her bathroom when she fell on her back. She denies LOC. She did not hit her head. She did land on her back and reports she felt a \"snap.\" She had pain immediately between her shoulder blades. She denies pain into her arms or legs, neck pain, low back pain, headaches or vision changes. She is anticoagulated on Eliquis for PAF. Last dose of Eliquis as last night around 9pm.     Past Medical History   I have reviewed this patient's medical history and updated it with pertinent information if needed.   No past medical history on file.    Past Surgical History   I have reviewed this patient's surgical history and updated it with pertinent information if needed.  No past surgical history on file.    Prior to Admission Medications   Prior to Admission Medications   Prescriptions Last Dose " Informant Patient Reported? Taking?   FLUoxetine (PROZAC) 20 MG capsule 7/13/2023 at pm  Yes Yes   Sig: Take 40 mg by mouth every evening   Multiple Vitamin (MULTIVITAMIN ADULT PO) 7/13/2023 at pm  Yes Yes   Sig: Take 1 tablet by mouth daily   apixaban ANTICOAGULANT (ELIQUIS) 2.5 MG tablet 7/13/2023 at pm  Yes Yes   Sig: Take 2.5 mg by mouth 2 times daily   atorvastatin (LIPITOR) 20 MG tablet 7/13/2023 at pm  Yes Yes   Sig: Take 20 mg by mouth daily   calcium carbonate 600 mg-vitamin D 400 units (CALTRATE) 600-400 MG-UNIT per tablet prn  Yes Yes   Sig: Take 1 tablet by mouth 2 times daily   gabapentin (NEURONTIN) 100 MG capsule 7/13/2023 at pm  Yes Yes   Sig: Take 300 mg by mouth At Bedtime   levothyroxine (SYNTHROID/LEVOTHROID) 50 MCG tablet 7/13/2023 at am  Yes Yes   Sig: Take 50 mcg by mouth every evening   multivitamin w/minerals (THERA-VIT-M) tablet 7/13/2023 at am  Yes Yes   Sig: Take 1 tablet by mouth daily   polyethylene glycol-propylene glycol PF (SYSTANE ULTRA PF) 0.4-0.3 % SOLN opthalmic solution prn  Yes Yes   Sig: Place 1 drop into both eyes 4 times daily as needed for dry eyes   vitamin D3 (CHOLECALCIFEROL) 50 mcg (2000 units) tablet 7/13/2023 at pm  Yes Yes   Sig: Take 1 tablet by mouth every evening      Facility-Administered Medications: None     Allergies   Allergies   Allergen Reactions     Sodium Hypochlorite Shortness Of Breath     Alendronate Diarrhea     Raloxifene Other (See Comments)     RIND on 11/8/2019     Sulfa Antibiotics Rash     Sulfasalazine Rash       Social History   I have reviewed this patient's social history and updated it with pertinent information if needed. Leesa Jacinto      Family History   I have reviewed this patient's family history and updated it with pertinent information if needed.   No family history on file.    Review of Systems   10 point ROS negative other than symptoms noted in HPI.    Physical Exam       BP: 124/60 Pulse: 89   Resp: 18 SpO2: 97 %       Vital Signs with Ranges  Pulse:  [85-92] 89  Resp:  [12-18] 18  BP: (124-133)/(60-64) 124/60  SpO2:  [92 %-97 %] 97 %  0 lbs 0 oz     , Blood pressure 124/60, pulse 89, resp. rate 18, SpO2 97 %.  0 lbs 0 oz  HEENT:  Normocephalic, atraumatic  Heart:  No peripheral edema  Lungs:  No SOB  Skin:  Warm and dry, good capillary refill.    NEUROLOGICAL EXAMINATION:   Mental status:  Alert and Oriented x 3, speech is fluent.  Motor:  Strength is 5/5 throughout the upper and lower extremities  Shoulder Abduction  Right:  5/5   Left:  5/5  Biceps                      Right:  5/5   Left:  5/5  Triceps                     Right:  5/5   Left:  5/5  Wrist Extensors        Right:  5/5   Left:  5/5  Wrist Flexors            Right:  5/5   Left:  5/5  Intrinsics                   Right:  5/5   Left:  5/5    Iliopsoas  (hip flexion)               Right: 5/5  Left:  5/5  Quadriceps  (knee extension)       Right:  5/5  Left:  5/5  Hamstrings  (knee flexion)            Right:  5/5  Left:  5/5  Gastroc Soleus  (PF)                          Right:  5/5  Left:  5/5  Tibialis Ant  (DF)                          Right:  5/5  Left:  5/5  EHL                          Right:  5/5  Left:  5/5    Sensation:  Intact to light touch   Reflexes:   Negative Clonus.  Negative Louis's sign.     Thoracic examination reveals tenderness to palpation of the spinal process between the shoulder blades. No tenderness to palpation over the lumbar or cervical spine.    Data        CT thoracic spine showing:   IMPRESSION:  There is a recent-appearing moderate burst compression  fracture deformity of the T3 vertebral body with mild retropulsion of  bony fragments into the spinal canal. There is chronic-appearing  moderate compression fracture deformity of the T12 vertebral body.  Vertebral body heights and contours of the thoracic spine are  otherwise normal. No other recent fractures identified. No spinal  canal stenosis.    CT lumbar spine  IMPRESSION:  There  is grade 1-2 degenerative anterolisthesis of L4  upon L5. There is grade 1 degenerative anterolisthesis of L5 upon S1.  Alignment otherwise normal. There is a large bony defect in the  superior aspect of the L2 vertebral body that most likely represents a  large degenerative Schmorl's node deformity, although chronic fracture  cannot be completely excluded. Vertebral body heights and contours  otherwise normal. No recent fractures. Moderate to severe facet  arthropathy bilaterally at L3-L4, L4-L5 and L5-S1. Moderate  degenerative spinal canal stenosis at L4-L5. No other significant  spinal canal narrowing of the lumbar spine.    CT head  IMPRESSION:  Diffuse cerebral volume loss and cerebral white matter  changes consistent with chronic small vessel ischemic disease. No  evidence for acute intracranial pathology    CT cervical spine  IMPRESSION: Minimal degenerative anterolisthesis of C4 upon C5 and C6  upon C7. Alignment of the cervical vertebrae is otherwise normal.  Vertebral body heights of the cervical spine are normal.  Craniocervical alignment is normal. There are no fractures of the  cervical spine.  No spinal canal stenosis. No prevertebral soft tissue  swelling.        CBC RESULTS:   Recent Labs   Lab Test 07/14/23  1051   WBC 10.7   RBC 3.81   HGB 11.9   HCT 34.5*   MCV 91   MCH 31.2   MCHC 34.5   RDW 14.0        Basic Metabolic Panel:  Lab Results   Component Value Date     07/14/2023      Lab Results   Component Value Date    POTASSIUM 3.5 07/14/2023    POTASSIUM 4.1 08/22/2022     Lab Results   Component Value Date    CHLORIDE 108 07/14/2023    CHLORIDE 111 08/22/2022     Lab Results   Component Value Date    DBEI 8.8 07/14/2023     Lab Results   Component Value Date    CO2 18 07/14/2023    CO2 24 08/22/2022     Lab Results   Component Value Date    BUN 20.2 07/14/2023    BUN 27 08/22/2022     Lab Results   Component Value Date    CR 0.85 07/14/2023     Lab Results   Component Value Date     GLC 98 07/14/2023    GLC 96 08/23/2022    GLC 85 08/22/2022     INR:  Lab Results   Component Value Date    INR 1.12 08/21/2022

## 2023-07-14 NOTE — ED PROVIDER NOTES
History     Chief Complaint:  Fall     The history is provided by the patient.      Leesa Jacinto is a 83 year old female on Eliquis with a history of paroxysmal atrial fibrillation and cerebral artery occlusion who present after a mechanical fall in the bathroom as she was trying to turn to get onto the toilet and fell onto her back. She notes she lost her balance and the fall occurred approximately 3.5 hours ago. She denies hitting her head or LOC. She notes she has pain between her scapula. She denies neck pain, pelvic pain, hip, arm, wrist, or thigh pain. She denies having taken her Eliquis today. She notes her back pain is a 10/10.     Independent Historian:   None - Patient Only    Review of External Notes:   None    Medications:    Apixaban   Atorvastatin   Calcium carbonate  Cilostazol   Fluoxetine  Gabapentin   Levothyroxine   Vitamin D3    Past Medical History:    History of Clostridioides difficile colitis  Pseudophakia  TIA  Paroxysmal atrial fibrillation  Peripheral neuropathy, idiopathic  Acute CVA  Movement disorder  Hypothyroidism  Skin cancer  Leukocytosis  Recurrent colitis due to Clostridium difficile  Gastric fistula  Stress-induced cardiomyopathy   Hepatitis A immune  Adjustment disorder with anxiety  Small bowel motility disorder  IT band syndrome  Osteoporosis, unspecified  Depressive disorder  GERD  Osteoarthrosis  BOOP  Cellulitis of left upper extremity  Community acquired pneumonia   Pancytopenia  Abdominal abscess  Severe malnutrition  Chronic diarrhea   SNHL  Enthesopathy of hip region  Malnutrition   Pleural effusion, left   Multinodular goiter  Gastric fistula  Cerebral artery occlusion with cerebral infarction     Past Surgical History:    Cholecystectomy  Cataract removal, bilateral  Yag laser eye procedure  Colonoscopy  Gastrostomy jejunostomy tube  Bronchoscopy    Physical Exam     Patient Vitals for the past 24 hrs:   BP Pulse Resp SpO2   07/14/23 1258 -- 89 18 97 %    07/14/23 1107 124/60 90 12 96 %   07/14/23 1035 133/64 85 17 97 %        Physical Exam  General: Laying on the ED bed, no distress  HEENT: Normocephalic, atraumatic  Cardiac: Radial pulses 2+, regular rate and rhythm  Pulm: Breathing comfortably, no accessory muscle usage, no conversational dyspnea, and lungs clear bilaterally  GI: Abdomen soft, nontender, no rigidity or guarding  MSK: C-spine nontender to palpation, no step-offs.  Mid T-spine is tender to palpation in the midline and without step-off.  Low T-spine/upper L-spine is tender to palpation in the midline, no step-off.  Pelvis is stable and nontender to compression.  Extremities x4 without bony deformity, no instability, tenderness to palpation, or painful range of motion.  Skin: Warm and dry  Neuro: Sensorimotor intact extremities x4  Psych: Pleasant mood and affect      Emergency Department Course   ECG  ECG taken at 1025, ECG read at 1043  Normal sinus rhythm  Nonspecific ST and T wave abnormality   No change as compared to prior, dated 08/21/2022.  Rate 81 bpm. KY interval 152 ms. QRS duration 72 ms. QT/QTc 398/462 ms. P-R-T axes 78 83 81.     Imaging:  CT Chest Abdomen Pelvis w/o Contrast   Preliminary Result   IMPRESSION:   1.  Compression deformities at T3, T12, and L2 are age indeterminate.   2.  No other acute traumatic abnormality identified.   3.  A few indeterminate pulmonary nodules.    4.  Mild biliary ductal prominence. Correlate with any serum biliary   abnormality.      Recommendations for one or multiple incidental lung nodules < 6mm:     Low risk patients: No routine follow-up.     High risk patients: Optional follow-up CT at 12 months; if   unchanged, no further follow-up.      *Low Risk: Minimal or absent history of smoking or other known risk   factors.   *Nonsolid (ground glass) or partly solid nodules may require longer   follow-up to exclude indolent adenocarcinoma.   *Recommendations based on Guidelines for the Management of  Incidental   Pulmonary Nodules Detected at CT: From the Fleischner Society 2017,   Radiology 2017.      CT Cervical Spine w/o Contrast   Final Result   IMPRESSION: Minimal degenerative anterolisthesis of C4 upon C5 and C6   upon C7. Alignment of the cervical vertebrae is otherwise normal.   Vertebral body heights of the cervical spine are normal.   Craniocervical alignment is normal. There are no fractures of the   cervical spine.  No spinal canal stenosis. No prevertebral soft tissue   swelling.         Radiation dose for this scan was reduced using automated exposure   control, adjustment of the mA and/or kV according to patient size, or   iterative reconstruction technique      KAMERON BECERRA MD            SYSTEM ID:  [id number]      CT Lumbar Spine Reconstructed   Final Result   IMPRESSION:  There is grade 1-2 degenerative anterolisthesis of L4   upon L5. There is grade 1 degenerative anterolisthesis of L5 upon S1.   Alignment otherwise normal. There is a large bony defect in the   superior aspect of the L2 vertebral body that most likely represents a   large degenerative Schmorl's node deformity, although chronic fracture   cannot be completely excluded. Vertebral body heights and contours   otherwise normal. No recent fractures. Moderate to severe facet   arthropathy bilaterally at L3-L4, L4-L5 and L5-S1. Moderate   degenerative spinal canal stenosis at L4-L5. No other significant   spinal canal narrowing of the lumbar spine.         Radiation dose for this scan was reduced using automated exposure   control, adjustment of the mA and/or kV according to patient size, or   iterative reconstruction technique.      KAMERON BECERRA MD            SYSTEM ID:  [id number]      CT Thoracic Spine Reconstructed   Final Result   IMPRESSION:  There is a recent-appearing moderate burst compression   fracture deformity of the T3 vertebral body with mild retropulsion of   bony fragments into the spinal canal. There is  chronic-appearing   moderate compression fracture deformity of the T12 vertebral body.   Vertebral body heights and contours of the thoracic spine are   otherwise normal. No other recent fractures identified. No spinal   canal stenosis.         Radiation dose for this scan was reduced using automated exposure   control, adjustment of the mA and/or kV according to patient size, or   iterative reconstruction technique.      KAMERON BECERRA MD            SYSTEM ID:  [id number]      CT Head w/o Contrast   Final Result   IMPRESSION:  Diffuse cerebral volume loss and cerebral white matter   changes consistent with chronic small vessel ischemic disease. No   evidence for acute intracranial pathology.          Radiation dose for this scan was reduced using automated exposure   control, adjustment of the mA and/or kV according to patient size, or   iterative reconstruction technique.      KAMERON BECERRA MD            SYSTEM ID:  [id number]      Thoracic spine MRI w/o contrast    (Results Pending)      Report per radiology    Laboratory:  Labs Ordered and Resulted from Time of ED Arrival to Time of ED Departure   BASIC METABOLIC PANEL - Abnormal       Result Value    Sodium 141      Potassium 3.5      Chloride 108 (*)     Carbon Dioxide (CO2) 18 (*)     Anion Gap 15      Urea Nitrogen 20.2      Creatinine 0.85      Calcium 8.8      Glucose 98      GFR Estimate 67     CBC WITH PLATELETS AND DIFFERENTIAL - Abnormal    WBC Count 10.7      RBC Count 3.81      Hemoglobin 11.9      Hematocrit 34.5 (*)     MCV 91      MCH 31.2      MCHC 34.5      RDW 14.0      Platelet Count 215      % Neutrophils 85      % Lymphocytes 7      % Monocytes 7      % Eosinophils 0      % Basophils 0      % Immature Granulocytes 1      NRBCs per 100 WBC 0      Absolute Neutrophils 9.2 (*)     Absolute Lymphocytes 0.7 (*)     Absolute Monocytes 0.7      Absolute Eosinophils 0.0      Absolute Basophils 0.0      Absolute Immature Granulocytes 0.1      Absolute  "NRBCs 0.0     MAGNESIUM - Normal    Magnesium 1.9        Emergency Department Course & Assessments:    Interventions:  Medications   ondansetron (ZOFRAN) injection 4 mg (4 mg Intravenous $Given 23 1051)   fentaNYL (PF) (SUBLIMAZE) injection 25 mcg (25 mcg Intravenous $Given 23 1051)   HYDROmorphone (PF) (DILAUDID) injection 0.3 mg (0.3 mg Intravenous $Given 23 1257)      Independent Interpretation (X-rays, CTs, rhythm strip):  None    Assessments/Consultations/Discussion of Management or Tests:  ED Course as of 23 1321      1004 I obtained the patient's history and examined as noted above.     1240 I consulted with Zunilda Montero PA-C, neurosurgery, regarding the patient's presentation to the ED who accepted the patient for admission.    1242 I rechecked the patient and explained findings.    1308 I consulted with Dr. Phelan, hospitalist, regarding the patient's presentation to the ED who accepted the patient for admission.      Social Determinants of Health affecting care:   None    Disposition:  The patient was admitted to the hospital under the care of Dr. Phelan.     Impression & Plan    CMS Diagnoses: None     Medical Decision Makin-year-old female on Eliquis presents with mechanical fall in the bathroom from standing as described above.  She is well-appearing on examination and has stable vital signs.  Exam is significant for midline tenderness over the T and high L-spine.  She states she felt a \"snap\" in that area when she fell.  Given her anticoagulated status, a CT head is obtained and shows no evidence of ICH.  CT C-spine shows no acute findings.  Given her back pain, CT of the chest/abdomen/pelvis was obtained and shows T3 fracture with retropulsion.  The patient is neurologically intact on my initial and repeat evaluations.  I discussed the thoracic spine fracture with neurosurgery who recommends MRI which was ordered.  Plan is for admission to the hospitalist " service for further pain control and neurosurgical consultation.    Critical Care time:  was 0 minutes for this patient excluding procedures.    Diagnosis:    ICD-10-CM    1. Pulmonary nodules  R91.8       2. Closed fracture of third thoracic vertebra, unspecified fracture morphology, initial encounter (H)  S22.039A          Scribe Disclosure:  Jojo CALDWELL, am serving as a scribe at 10:16 AM on 7/14/2023 to document services personally performed by Michael Rojas MD based on my observations and the provider's statements to me.     7/14/2023   Michael Rojas MD King, Colin, MD  07/14/23 0532

## 2023-07-14 NOTE — H&P
[facility]    History and Physical - Hospitalist Service       Date of Admission:  7/14/2023    Assessment & Plan      Leesa Jacinto is a 83 year old female history of osteoporosis, PAF on eliquis, peripheral neuropathy, hypertension, hypothyroidism, anxiety, osteoporosis, stress-induced cardiomyopathy, CVA hyperlipidemia, admitted on 7/14/2023 admitted with a fall and back pain.     1. Fall    Patient was in her bathroom and reported turning and trying to get onto the toilet. >> she landed on her back but protected her head    Pain only between her shoulder blades.     7/14 CT head showing diffuse cerebral volume loss and white matter changes consistent with chronic small vessel ischemic disease.    7/14 CT thoracic spine showing moderate burst compression fracture of T3 with mild retropulsion of bony fragments into the spinal canal.  Chronic moderate compression fracture at T12 otherwise normal    7/14 lumbar CT There is grade 1-2 degenerative anterolisthesis of L4 upon L5. There is grade 1 degenerative anterolisthesis of L5 upon S1. Alignment otherwise normal. There is a large bony defect in the superior aspect of the L2 vertebral body that most likely represents a large degenerative Schmorl's node deformity, although chronic fracture cannot be completely excluded. Vertebral body heights and contours otherwise normal. No recent fractures. Moderate to severe facet arthropathy bilaterally at L3-L4, L4-L5 and L5-S1. Moderate degenerative spinal canal stenosis at L4-L5. No other significant spinal canal narrowing of the lumbar spine.    Seen by neurosurgery for her thoracic compression fractures.     Pain control     Activity per neurosurgery  >> when patient can get up and activity     ADDENDUM     CT chest/abdomen/pelvis with fall on eliquis   IMPRESSION:  1.  Compression deformities at T3, T12, and L2 are age indeterminate.  2.  No other acute traumatic abnormality  identified.  3.  A few indeterminate pulmonary nodules.   4.  Mild biliary ductal prominence. Correlate with any serum biliary abnormality.       2. Thoracic compression fracture    7/14 CT thoracic spine showing moderate burst compression fracture of T3 with mild retropulsion of bony fragments into the spinal canal.  Chronic moderate compression fracture at T12 otherwise normal    MRI ordered and pending    Neurosurgery consult in ER.     She is not focal on examination neurologically     Holding eliquis and if no procedures/interventions would restart     3. History of osteoporosis    Treated prior as outpatient     4. History of CVA currently on Eliquis    11/2019 MRI/MRA multiple small areas of restricted diffusion in the left superior frontal gyrus and in the left frontal lobe periventricular white matter anterior to left frontal horn as well as multisegmental severe narrowing of the bilateral A2 anterior cerebral artery segments and moderate narrowing of the right M2 MCA inferior division (admit to [facility] for CVA) stroke thought secondary to intracranial atherosclerosis with left PRISCA cerebral infarction. With bilateral PRISCA stenosis.  Initially on ASA and plavix     8/21/2022 presentation with left arm and leg weakness and limb shaking of LLE   Imaging negative for acute stroke including CT/MRI :  Diagnosed with limb shaking TIA ; started on pletal     Started on eliquis 6/2022 after cardiology visit with PAF and strokes.     5. Paroxysmal atrial fibrillation    Seen by PCP 5/2022 for abnormalities on heart rhythm smart watch.     History of afib on monitor in 2016.     6/2022 cardiology appointment.     Started on eliquis 6/2022     6. Takotsubo cardiomopathy 2015 (resolved)     Resolved on follow up normal EF     Coronary CT 1/2016 with no obstructive coronary artery disease. Calcium score of 46     8/2022 EF 55% and no WMA. No valve disease: Grade 1 or early diastolic dysfunction.  "    7. Hypothyroidism    Continue synthroid     8. Idiopathic peripheral neuropathy    On neurontin 300 mg po at night     9. ADDENDUM pulmonary nodules    7/14 CT chest with bilateral subpleural scarring or atelectasis. RUL 2 mm nodule. RML 3 mm and 3 mm second nodule in RML other smaller nodules.     WILL REQUIRE FOLLOW UP Nodule clinic     10. Anxiety    Prozac 40 mg at evening     11. CODE STATUS: DNR/DNI  12. DVT prevention: on eliquis Currently on hold. Seen with neurosurgery in ER and requested neurosurgery contact medicine about when to resume if ok      Diet:   regular   DVT Prophylaxis: on eliquis   Limon Catheter: Not present  Lines: None     Cardiac Monitoring: None  Code Status: No CPR- Do NOT Intubate      Clinically Significant Risk Factors Present on Admission               # Drug Induced Coagulation Defect: home medication list includes an anticoagulant medication  # Drug Induced Platelet Defect: home medication list includes an antiplatelet medication                 Disposition Plan      Expected Discharge Date: 07/15/2023                  HANSA WHITE MD  Hospitalist Service  [facility]  Securely message with [employer] (more info)  Text page via [employer] Paging/Directory     ______________________________________________________________________    Chief Complaint   Fall with back pain     History is obtained from the patient    History of Present Illness     Leesa Jacinto is a 83 year old female history of osteoporosis, PAF on eliquis, peripheral neuropathy, hypertension, hypothyroidism, anxiety, osteoporosis, stress-induced cardiomyopathy, CVA hyperlipidemia, admitted on 7/14/2023 admitted with a fall and back pain.     Patient was at home and going to the bathroom.  She was trying to turn to get onto the toilet and fell onto her back.  She lost her balance.  Reports landing on her back but did not hit her head.  Reportedly heard a \"snap \". Started to develop " pain between her scapula/shoulder blades.  She denied pain in other areas.  She is on Eliquis.  Pain is 10/10 in intensity.     On arrival to the ER her blood pressure is 133/64 with a pulse of 85  Patient slightly uncomfortable complaining of pain between her shoulder blades.  Imaging completed secondary to the fall showing a burst fracture of T3 with mild retropulsion of bony fragments into the spinal cord.  Patient is scheduled to have an MRI done.  She was seen in the ER with neurosurgery at the bedside.  Did not appear to have any focal findings.  Her Eliquis was placed on hold until plans confirmed an MRI of her back completed       7/14 CT head showing diffuse cerebral volume loss and white matter changes consistent with chronic small vessel ischemic disease.    7/14 CT thoracic spine showing moderate burst compression fracture of T3 with mild retropulsion of bony fragments into the spinal canal.  Chronic moderate compression fracture at T12 otherwise normal    7/14 lumbar CT There is grade 1-2 degenerative anterolisthesis of L4 upon L5. There is grade 1 degenerative anterolisthesis of L5 upon S1. Alignment otherwise normal. There is a large bony defect in the superior aspect of the L2 vertebral body that most likely represents a large degenerative Schmorl's node deformity, although chronic fracture cannot be completely excluded. Vertebral body heights and contours otherwise normal. No recent fractures. Moderate to severe facet arthropathy bilaterally at L3-L4, L4-L5 and L5-S1. Moderate degenerative spinal canal stenosis at L4-L5. No other significant spinal canal narrowing of the lumbar spine.      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  Multinodular goiter    Past Surgical History     Cholecystectomy  History of a gastrostomy jejunostomy tube  Removal of cataract surgery bilaterally    Prior to Admission Medications   Prior to Admission Medications   Prescriptions Last Dose Informant Patient Reported? Taking?   FLUoxetine (PROZAC) 20 MG capsule 7/13/2023 at pm  Yes Yes   Sig: Take 40 mg by mouth every evening   apixaban ANTICOAGULANT (ELIQUIS) 2.5 MG tablet 7/13/2023 at pm  Yes Yes   Sig: Take 2.5 mg by mouth 2 times daily   atorvastatin (LIPITOR) 20 MG tablet 7/13/2023 at pm  Yes Yes   Sig: Take 20 mg by mouth daily   calcium carbonate 600 mg-vitamin D 400 units (CALTRATE) 600-400 MG-UNIT per tablet 7/13/2023 at pm  Yes Yes   Sig: Take 1 tablet by mouth 2 times daily   cilostazol (PLETAL) 100 MG tablet 7/13/2023 at pm  Yes Yes   Sig: Take 100 mg by mouth 2 times daily   gabapentin (NEURONTIN) 100 MG capsule 7/13/2023 at pm  Yes Yes   Sig: Take 300 mg by mouth At Bedtime   levothyroxine (SYNTHROID/LEVOTHROID) 50 MCG tablet 7/13/2023 at am  Yes Yes   Sig: Take 50 mcg by mouth every evening   multivitamin w/minerals (THERA-VIT-M) tablet 7/13/2023 at am  Yes Yes   Sig: Take 1 tablet by mouth daily   polyethylene glycol-propylene glycol PF (SYSTANE ULTRA PF) 0.4-0.3 % SOLN opthalmic solution prn  Yes Yes   Sig: Place 1 drop into both eyes 4 times daily as needed for dry eyes   vitamin D3 (CHOLECALCIFEROL) 50 mcg (2000 units) tablet 7/13/2023 at pm  Yes Yes   Sig: Take 1 tablet by mouth every evening      Facility-Administered Medications: None        Review of Systems    The 10 point Review of Systems is negative other than noted in the HPI or here.      Physical Exam   Vital Signs:     BP: 124/60 Pulse: 95   Resp: 17 SpO2: 96 %      Weight: 0 lbs 0 oz    General Appearance: Patient is awake and alert appears slightly uncomfortable  Respiratory: Lungs are clear to auscultation bilaterally with no wheezes or rhonchi  Cardiovascular: Cardiovascular regular rate and rhythm without gallop rub normal  S1-S2  GI: Abdomen is soft no rebound guarding tenderness.  Skin: No rashes  Musculoskeletal: Able to move both bilateral upper and lower extremities.  Does not have any focal weakness.  Tenderness along her thoracic spine in the mid scapular region  No bruises redness or swelling  Neurologic: Alert and oriented      Medical Decision Making       75 MINUTES SPENT BY ME on the date of service doing chart review, history, exam, documentation & further activities per the note.      Data     I have personally reviewed the following data over the past 24 hrs:    10.7  \   11.9   / 215     141 108 (H) 20.2 /  98   3.5 18 (L) 0.85 \       Imaging results reviewed over the past 24 hrs:   Recent Results (from the past 24 hour(s))   CT Head w/o Contrast    Narrative    CT OF THE HEAD WITHOUT CONTRAST  7/14/2023 11:46 AM     COMPARISON: Brain MR 8/21/2022.    HISTORY: Fall, on Eliquis.    TECHNIQUE: 5 mm thick axial CT images of the head were acquired  without IV contrast material.    FINDINGS:  There is mild diffuse cerebral volume loss. There are  subtle patchy areas of decreased density in the cerebral white matter  bilaterally that are consistent with sequela of chronic small vessel  ischemic disease.     The ventricles and basal cisterns are within normal limits in  configuration given the degree of cerebral volume loss.  There is no  midline shift. There are no extra-axial fluid collections.     No intracranial hemorrhage, mass or recent infarct.    The visualized paranasal sinuses are well-aerated. There is no  mastoiditis. There are no fractures of the visualized bones.       Impression    IMPRESSION:  Diffuse cerebral volume loss and cerebral white matter  changes consistent with chronic small vessel ischemic disease. No  evidence for acute intracranial pathology.       Radiation dose for this scan was reduced using automated exposure  control, adjustment of the mA and/or kV according to patient size, or  iterative  reconstruction technique.    KAMERON BECERRA MD         SYSTEM ID:  [id number]   CT Thoracic Spine Reconstructed    Narrative    CT OF THE THORACIC SPINE WITHOUT CONTRAST   7/14/2023 11:46 AM     COMPARISON: None.    HISTORY: Fall. Trauma. Pain.     TECHNIQUE: Axial CT images of the thoracic spine were acquired without  intravenous contrast. Multiplanar reformations were created from the  axial source images.        Impression    IMPRESSION:  There is a recent-appearing moderate burst compression  fracture deformity of the T3 vertebral body with mild retropulsion of  bony fragments into the spinal canal. There is chronic-appearing  moderate compression fracture deformity of the T12 vertebral body.  Vertebral body heights and contours of the thoracic spine are  otherwise normal. No other recent fractures identified. No spinal  canal stenosis.      Radiation dose for this scan was reduced using automated exposure  control, adjustment of the mA and/or kV according to patient size, or  iterative reconstruction technique.    KAMERON BECERRA MD         SYSTEM ID:  [id number]   CT Lumbar Spine Reconstructed    Narrative    CT OF THE LUMBAR SPINE WITHOUT CONTRAST  7/14/2023 11:47 AM     COMPARISON: None.    HISTORY: Fall, on Eliquis, upper and lower back pain in the midline.     TECHNIQUE: Axial images of the lumbar spine were acquired without  intravenous contrast. Multiplanar reformations were created from the  axial source images.        Impression    IMPRESSION:  There is grade 1-2 degenerative anterolisthesis of L4  upon L5. There is grade 1 degenerative anterolisthesis of L5 upon S1.  Alignment otherwise normal. There is a large bony defect in the  superior aspect of the L2 vertebral body that most likely represents a  large degenerative Schmorl's node deformity, although chronic fracture  cannot be completely excluded. Vertebral body heights and contours  otherwise normal. No recent fractures. Moderate to severe  facet  arthropathy bilaterally at L3-L4, L4-L5 and L5-S1. Moderate  degenerative spinal canal stenosis at L4-L5. No other significant  spinal canal narrowing of the lumbar spine.      Radiation dose for this scan was reduced using automated exposure  control, adjustment of the mA and/or kV according to patient size, or  iterative reconstruction technique.    KAMERON BECERRA MD         SYSTEM ID:  [id number]   CT Cervical Spine w/o Contrast    Narrative    CT OF THE CERVICAL SPINE WITHOUT CONTRAST   7/14/2023 11:48 AM     COMPARISON: None    HISTORY: Fall. Trauma. Pain.     TECHNIQUE: Axial images of the cervical spine were acquired without  intravenous contrast. Multiplanar reformations were created.        Impression    IMPRESSION: Minimal degenerative anterolisthesis of C4 upon C5 and C6  upon C7. Alignment of the cervical vertebrae is otherwise normal.  Vertebral body heights of the cervical spine are normal.  Craniocervical alignment is normal. There are no fractures of the  cervical spine.  No spinal canal stenosis. No prevertebral soft tissue  swelling.      Radiation dose for this scan was reduced using automated exposure  control, adjustment of the mA and/or kV according to patient size, or  iterative reconstruction technique    KAMERON BECERRA MD         SYSTEM ID:  [id number]   CT Chest Abdomen Pelvis w/o Contrast    Narrative    CT CHEST, ABDOMEN AND PELVIS WITHOUT CONTRAST  7/14/2023 11:49 AM    CLINICAL HISTORY: Fall, on Eliquis, upper and lower back pain in the  midline.    TECHNIQUE: CT scan of the chest, abdomen, and pelvis was performed  without IV contrast. Multiplanar reformats were obtained. Dose  reduction techniques were used.   CONTRAST: None.    COMPARISON: None.    FINDINGS:   LUNGS AND PLEURA: No effusions. No pneumothorax. Bilateral subpleural  scarring or atelectasis. Right upper lobe 2 mm nodule, series 3 image  73. Right middle lobe 3 mm nodule, series 5 image 188. Another 3 mm  right middle  lobe nodule, image 193. There are other small pulmonary  nodules.    MEDIASTINUM/AXILLAE: No adenopathy. No acute mediastinal abnormality.    CORONARY ARTERY CALCIFICATION: Moderate.    HEPATOBILIARY: No acute hepatic abnormality. Gallbladder not seen.  Mild intrahepatic biliary ductal prominence.    PANCREAS: Normal.    SPLEEN: Normal.    ADRENAL GLANDS: Normal.    KIDNEYS/BLADDER: No hydronephrosis or urolithiasis. No acute  abnormality.    BOWEL: No small bowel obstruction. Fluid and moderate stool in the  colon. No convincing evidence for appendicitis. A few colonic  diverticula.    LYMPH NODES: Normal.    VASCULATURE: Scattered vascular calcifications    PELVIC ORGANS: No acute abnormality.    OTHER: None.    MUSCULOSKELETAL: Compression deformities at T3, T12, and L2 vertebral  bodies are age indeterminate. Spine degenerative changes.  Anterolisthesis of L4 relative to L5.      Impression    IMPRESSION:  1.  Compression deformities at T3, T12, and L2 are age indeterminate.  2.  No other acute traumatic abnormality identified.  3.  A few indeterminate pulmonary nodules.   4.  Mild biliary ductal prominence. Correlate with any serum biliary  abnormality.    Recommendations for one or multiple incidental lung nodules < 6mm:    Low risk patients: No routine follow-up.    High risk patients: Optional follow-up CT at 12 months; if  unchanged, no further follow-up.    *Low Risk: Minimal or absent history of smoking or other known risk  factors.  *Nonsolid (ground glass) or partly solid nodules may require longer  follow-up to exclude indolent adenocarcinoma.  *Recommendations based on Guidelines for the Management of Incidental  Pulmonary Nodules Detected at CT: From the Fleischner Society 2017,  Radiology 2017.    AMOS KAY MD         SYSTEM ID:  [id number]     Recent Labs   Lab 07/14/23  1051   WBC 10.7   HGB 11.9   MCV 91         POTASSIUM 3.5   CHLORIDE 108*   CO2 18*   BUN 20.2   CR 0.85   ANIONGAP  15   DEBI 8.8   GLC 98     Most Recent 3 CBC's:Recent Labs   Lab Test 07/14/23  1051 08/22/22  0722 08/21/22 1929   WBC 10.7 6.1 6.6   HGB 11.9 12.2 13.2   MCV 91 93 91    210 217     Most Recent 3 BMP's:Recent Labs   Lab Test 07/14/23  1051 08/23/22  0817 08/22/22  2213 08/22/22  0737 08/22/22  0722 08/22/22  0200 08/21/22 1929     --   --   --  140  --  141   POTASSIUM 3.5  --   --   --  4.1  --  3.8   CHLORIDE 108*  --   --   --  111*  --  108   CO2 18*  --   --   --  24  --  25   BUN 20.2  --   --   --  27  --  37*   CR 0.85  --   --   --  0.90  --  0.96   ANIONGAP 15  --   --   --  5  --  8   DEBI 8.8  --   --   --  8.4*  --  9.2   GLC 98 96 86   < > 85   < > 101*    < > = values in this interval not displayed.     Most Recent 2 LFT's:No lab results found.  Most Recent 3 INR's:Recent Labs   Lab Test 08/21/22 1929   INR 1.12     Anticoagulation Dose History        Latest Ref Rng & Units 8/21/2022   Recent Dosing and Labs   INR 0.85 - 1.15 1.12      Most Recent 3 Creatinines:Recent Labs   Lab Test 07/14/23  1051 08/22/22  0722 08/21/22 1929   CR 0.85 0.90 0.96     Most Recent 3 Hemoglobins:Recent Labs   Lab Test 07/14/23  1051 08/22/22  0722 08/21/22 1929   HGB 11.9 12.2 13.2     Most Recent 3 Troponin's:No lab results found.  Most Recent 3 BNP's:No lab results found.  Most Recent D-dimer:No lab results found.  Most Recent Cholesterol Panel:Recent Labs   Lab Test 08/22/22 0722   CHOL 139   LDL 57   HDL 74   TRIG 40     Most Recent 6 Bacteria Isolates From Any Culture (See EPIC Reports for Culture Details):No lab results found.  Most Recent TSH and T4:No lab results found.  Most Recent Hemoglobin A1c:Recent Labs   Lab Test 08/21/22  1929   A1C 5.3     Most Recent 6 glucoses:Recent Labs   Lab Test 07/14/23  1051 08/23/22  0817 08/22/22  2213 08/22/22  1155 08/22/22  0737 08/22/22  0722   GLC 98 96 86 92 92 85     Most Recent Urinalysis:No lab results found.  Most Recent ABG:No lab results  found.  Most Recent ESR & CRP:No lab results found.  Most Recent Anemia Panel:Recent Labs   Lab Test 07/14/23  1051   WBC 10.7   HGB 11.9   HCT 34.5*   MCV 91        Most Recent CPK:No lab results found.

## 2023-07-14 NOTE — PHARMACY-ADMISSION MEDICATION HISTORY
Pharmacy Intern Admission Medication History    Admission medication history is complete. The information provided in this note is only as accurate as the sources available at the time of the update.    Medication reconciliation/reorder completed by provider prior to medication history? No    Information Source(s): Patient and CareEverywhere/SureScripts via in-person    Pertinent Information: None    Changes made to PTA medication list:    Added:   o Cilostazol 100 mg     Deleted: None    Changed: None    Medication Affordability:  Not including over the counter (OTC) medications, was there a time in the past 3 months when you did not take your medications as prescribed because of cost?: No    Allergies reviewed with patient and updates made in EHR: yes    Medication History Completed By: Antonino Brantley 7/14/2023 2:05 PM\    Medication History Completed By: Antonino Brantley 7/14/2023 2:11 PM    Prior to Admission medications    Medication Sig Last Dose Taking? Auth Provider Long Term End Date   apixaban ANTICOAGULANT (ELIQUIS) 2.5 MG tablet Take 2.5 mg by mouth 2 times daily 7/13/2023 at pm Yes Unknown, Entered By History     atorvastatin (LIPITOR) 20 MG tablet Take 20 mg by mouth daily 7/13/2023 at pm Yes Unknown, Entered By History Yes    calcium carbonate 600 mg-vitamin D 400 units (CALTRATE) 600-400 MG-UNIT per tablet Take 1 tablet by mouth 2 times daily 7/13/2023 at pm Yes Unknown, Entered By History     cilostazol (PLETAL) 100 MG tablet Take 100 mg by mouth 2 times daily 7/13/2023 at pm Yes Unknown, Entered By History No    FLUoxetine (PROZAC) 20 MG capsule Take 40 mg by mouth every evening 7/13/2023 at pm Yes Unknown, Entered By History Yes    gabapentin (NEURONTIN) 100 MG capsule Take 300 mg by mouth At Bedtime 7/13/2023 at pm Yes Unknown, Entered By History Yes    levothyroxine (SYNTHROID/LEVOTHROID) 50 MCG tablet Take 50 mcg by mouth every evening 7/13/2023 at am Yes Unknown, Entered By History Yes     multivitamin w/minerals (THERA-VIT-M) tablet Take 1 tablet by mouth daily 7/13/2023 at am Yes Unknown, Entered By History     polyethylene glycol-propylene glycol PF (SYSTANE ULTRA PF) 0.4-0.3 % SOLN opthalmic solution Place 1 drop into both eyes 4 times daily as needed for dry eyes prn Yes Unknown, Entered By History     vitamin D3 (CHOLECALCIFEROL) 50 mcg (2000 units) tablet Take 1 tablet by mouth every evening 7/13/2023 at pm Yes Unknown, Entered By History

## 2023-07-15 ENCOUNTER — APPOINTMENT (OUTPATIENT)
Dept: PHYSICAL THERAPY | Facility: CLINIC | Age: 83
DRG: 551 | End: 2023-07-15
Attending: INTERNAL MEDICINE
Payer: COMMERCIAL

## 2023-07-15 LAB
ALBUMIN SERPL BCG-MCNC: 3.5 G/DL (ref 3.5–5.2)
ALP SERPL-CCNC: 92 U/L (ref 35–104)
ALT SERPL W P-5'-P-CCNC: 81 U/L (ref 0–50)
ANION GAP SERPL CALCULATED.3IONS-SCNC: 10 MMOL/L (ref 7–15)
AST SERPL W P-5'-P-CCNC: 106 U/L (ref 0–45)
BILIRUB SERPL-MCNC: 0.9 MG/DL
BUN SERPL-MCNC: 24.5 MG/DL (ref 8–23)
CALCIUM SERPL-MCNC: 8.5 MG/DL (ref 8.8–10.2)
CHLORIDE SERPL-SCNC: 107 MMOL/L (ref 98–107)
CREAT SERPL-MCNC: 1.03 MG/DL (ref 0.51–0.95)
DEPRECATED HCO3 PLAS-SCNC: 22 MMOL/L (ref 22–29)
ERYTHROCYTE [DISTWIDTH] IN BLOOD BY AUTOMATED COUNT: 14.3 % (ref 10–15)
GFR SERPL CREATININE-BSD FRML MDRD: 53 ML/MIN/1.73M2
GLUCOSE SERPL-MCNC: 95 MG/DL (ref 70–99)
HCT VFR BLD AUTO: 33.4 % (ref 35–47)
HGB BLD-MCNC: 11.1 G/DL (ref 11.7–15.7)
MCH RBC QN AUTO: 31.8 PG (ref 26.5–33)
MCHC RBC AUTO-ENTMCNC: 33.2 G/DL (ref 31.5–36.5)
MCV RBC AUTO: 96 FL (ref 78–100)
PLATELET # BLD AUTO: 169 10E3/UL (ref 150–450)
POTASSIUM SERPL-SCNC: 3.8 MMOL/L (ref 3.4–5.3)
PROT SERPL-MCNC: 5.6 G/DL (ref 6.4–8.3)
RBC # BLD AUTO: 3.49 10E6/UL (ref 3.8–5.2)
SODIUM SERPL-SCNC: 139 MMOL/L (ref 136–145)
WBC # BLD AUTO: 6.9 10E3/UL (ref 4–11)

## 2023-07-15 PROCEDURE — 36415 COLL VENOUS BLD VENIPUNCTURE: CPT | Performed by: INTERNAL MEDICINE

## 2023-07-15 PROCEDURE — 97530 THERAPEUTIC ACTIVITIES: CPT | Mod: GP | Performed by: PHYSICAL THERAPIST

## 2023-07-15 PROCEDURE — 80053 COMPREHEN METABOLIC PANEL: CPT | Performed by: INTERNAL MEDICINE

## 2023-07-15 PROCEDURE — G0378 HOSPITAL OBSERVATION PER HR: HCPCS

## 2023-07-15 PROCEDURE — 99231 SBSQ HOSP IP/OBS SF/LOW 25: CPT | Performed by: NURSE PRACTITIONER

## 2023-07-15 PROCEDURE — 96376 TX/PRO/DX INJ SAME DRUG ADON: CPT

## 2023-07-15 PROCEDURE — 250N000013 HC RX MED GY IP 250 OP 250 PS 637: Performed by: INTERNAL MEDICINE

## 2023-07-15 PROCEDURE — 97116 GAIT TRAINING THERAPY: CPT | Mod: GP | Performed by: PHYSICAL THERAPIST

## 2023-07-15 PROCEDURE — 250N000011 HC RX IP 250 OP 636: Mod: JZ | Performed by: INTERNAL MEDICINE

## 2023-07-15 PROCEDURE — 97161 PT EVAL LOW COMPLEX 20 MIN: CPT | Mod: GP | Performed by: PHYSICAL THERAPIST

## 2023-07-15 PROCEDURE — 85027 COMPLETE CBC AUTOMATED: CPT | Performed by: INTERNAL MEDICINE

## 2023-07-15 PROCEDURE — 250N000013 HC RX MED GY IP 250 OP 250 PS 637: Performed by: NURSE PRACTITIONER

## 2023-07-15 RX ORDER — CYCLOBENZAPRINE HCL 5 MG
10 TABLET ORAL EVERY 8 HOURS PRN
Status: DISCONTINUED | OUTPATIENT
Start: 2023-07-15 | End: 2023-07-25 | Stop reason: HOSPADM

## 2023-07-15 RX ORDER — LACTOBACILLUS RHAMNOSUS GG 10B CELL
1 CAPSULE ORAL 2 TIMES DAILY
Status: DISCONTINUED | OUTPATIENT
Start: 2023-07-15 | End: 2023-07-25 | Stop reason: HOSPADM

## 2023-07-15 RX ADMIN — Medication 50 MCG: at 19:14

## 2023-07-15 RX ADMIN — ACETAMINOPHEN 650 MG: 325 TABLET, FILM COATED ORAL at 06:38

## 2023-07-15 RX ADMIN — LEVOTHYROXINE SODIUM 50 MCG: 50 TABLET ORAL at 19:14

## 2023-07-15 RX ADMIN — GABAPENTIN 300 MG: 300 CAPSULE ORAL at 21:12

## 2023-07-15 RX ADMIN — OXYCODONE HYDROCHLORIDE 5 MG: 5 TABLET ORAL at 22:46

## 2023-07-15 RX ADMIN — SENNOSIDES 8.6 MG: 8.6 TABLET, FILM COATED ORAL at 19:14

## 2023-07-15 RX ADMIN — HYDROMORPHONE HYDROCHLORIDE 0.3 MG: 1 INJECTION, SOLUTION INTRAMUSCULAR; INTRAVENOUS; SUBCUTANEOUS at 00:30

## 2023-07-15 RX ADMIN — APIXABAN 2.5 MG: 2.5 TABLET, FILM COATED ORAL at 19:14

## 2023-07-15 RX ADMIN — ACETAMINOPHEN 650 MG: 325 TABLET, FILM COATED ORAL at 18:13

## 2023-07-15 RX ADMIN — ATORVASTATIN CALCIUM 20 MG: 20 TABLET, FILM COATED ORAL at 19:13

## 2023-07-15 RX ADMIN — CYCLOBENZAPRINE 10 MG: 10 TABLET, FILM COATED ORAL at 14:02

## 2023-07-15 RX ADMIN — HYDROMORPHONE HYDROCHLORIDE 0.3 MG: 0.2 INJECTION, SOLUTION INTRAMUSCULAR; INTRAVENOUS; SUBCUTANEOUS at 12:45

## 2023-07-15 RX ADMIN — LIDOCAINE 1 PATCH: 560 PATCH PERCUTANEOUS; TOPICAL; TRANSDERMAL at 19:14

## 2023-07-15 RX ADMIN — CILOSTAZOL 100 MG: 100 TABLET ORAL at 08:30

## 2023-07-15 RX ADMIN — ACETAMINOPHEN 650 MG: 325 TABLET, FILM COATED ORAL at 23:14

## 2023-07-15 RX ADMIN — FLUOXETINE 40 MG: 20 CAPSULE ORAL at 19:14

## 2023-07-15 RX ADMIN — ACETAMINOPHEN 650 MG: 325 TABLET, FILM COATED ORAL at 00:25

## 2023-07-15 RX ADMIN — OXYCODONE HYDROCHLORIDE 5 MG: 5 TABLET ORAL at 14:02

## 2023-07-15 RX ADMIN — OXYCODONE HYDROCHLORIDE 2.5 MG: 5 TABLET ORAL at 06:38

## 2023-07-15 RX ADMIN — Medication 1 CAPSULE: at 19:14

## 2023-07-15 RX ADMIN — MULTIPLE VITAMINS W/ MINERALS TAB 1 TABLET: TAB at 08:30

## 2023-07-15 RX ADMIN — CILOSTAZOL 100 MG: 100 TABLET ORAL at 19:13

## 2023-07-15 RX ADMIN — ACETAMINOPHEN 650 MG: 325 TABLET, FILM COATED ORAL at 12:45

## 2023-07-15 ASSESSMENT — ACTIVITIES OF DAILY LIVING (ADL)
ADLS_ACUITY_SCORE: 43
ADLS_ACUITY_SCORE: 39
ADLS_ACUITY_SCORE: 39
ADLS_ACUITY_SCORE: 43
ADLS_ACUITY_SCORE: 43
ADLS_ACUITY_SCORE: 39
ADLS_ACUITY_SCORE: 43
ADLS_ACUITY_SCORE: 39
ADLS_ACUITY_SCORE: 43
ADLS_ACUITY_SCORE: 43

## 2023-07-15 NOTE — PROVIDER NOTIFICATION
MD Notification    Notified Person: MD    Notified Person Name: Dr. Gonzalez    Notification Date/Time: 0105 7/15/23    Notification Interaction: text page     Purpose of Notification: T3 burst compression fracture. pt having new marck tingling and decreased sensation in both feet. VSS, other neuros intact. please advise. thanks!     Orders Received: awaiting callback / orders    Comments: Received PO tylenol and IV dilaudid at 0030 (see EMAR)      ADDENDUM 0142: Discussed w/ Dr. Gonzalez over phone. Thoracic fracture precautions in place. Recommend paging neurosurgery and continued close monitoring.

## 2023-07-15 NOTE — PLAN OF CARE
Goal Outcome Evaluation:      Plan of Care Reviewed With: patient    Overall Patient Progress: no changeOverall Patient Progress: no change     A&Ox 4. VSS, RA. Neuro intact, unchanged. Pain managed with IV dilaudid and oxy. Up with 1 and walker, brace when OOB. Pending TCU placement.

## 2023-07-15 NOTE — PROGRESS NOTES
PRIMARY DIAGNOSIS: ACUTE PAIN   OUTPATIENT/OBSERVATION GOALS TO BE MET BEFORE DISCHARGE:  1. Pain Status: Improved but still requiring IV narcotics.    2. Return to near baseline physical activity: No    3. Cleared for discharge by consultants (if involved): No    Discharge Planner Nurse   Safe discharge environment identified: No  Barriers to discharge: Yes       Entered by: Neda Cabrera RN 07/15/2023 5:08 AM     Please review provider order for any additional goals.   Nurse to notify provider when observation goals have been met and patient is ready for discharge.

## 2023-07-15 NOTE — PROGRESS NOTES
PRIMARY DIAGNOSIS: ACUTE PAIN   OUTPATIENT/OBSERVATION GOALS TO BE MET BEFORE DISCHARGE:  1. Pain Status: partially met.     2. Return to near baseline physical activity: No     3. Cleared for discharge by consultants (if involved): No        Discharge Planner Nurse   Safe discharge environment identified: No  Barriers to discharge: Yes  Please review provider order for any additional goals.   Nurse to notify provider when observation goals have been met and patient is ready for discharge.

## 2023-07-15 NOTE — PROGRESS NOTES
07/15/23 1128   Appointment Info   Signing Clinician's Name / Credentials (PT) Rach Reyesken PT   Living Environment   People in Home alone   Current Living Arrangements apartment   Home Accessibility no concerns   Transportation Anticipated family or friend will provide   Living Environment Comments Pt lives in IND living apt, has senior living attached but she doesn't use any services.   Self-Care   Usual Activity Tolerance excellent   Current Activity Tolerance moderate   Regular Exercise Yes   Activity/Exercise Type other (see comments);walking  (pt does an hour of balance activities per day)   Exercise Amount/Frequency 1 hr;daily   Equipment Currently Used at Home walker, rolling   Fall history within last six months yes   Number of times patient has fallen within last six months 1   Activity/Exercise/Self-Care Comment pt doesn't use walker at baseline, for long walks outside apt building she will use the [employer] walker   General Information   Onset of Illness/Injury or Date of Surgery 07/14/23   Referring Physician Ce Webber   Patient/Family Therapy Goals Statement (PT) pt hopes to go home soon   Pertinent History of Current Problem (include personal factors and/or comorbidities that impact the POC) pt admitted with burst fracture T3, fell in bathroom at home. Also has chronic compression fracture T12.   Existing Precautions/Restrictions brace worn when out of bed   General Observations pt was on flat bedrest, now can get up as joseph with TLSO   Cognition   Affect/Mental Status (Cognition) WFL   Cognitive Status Comments no cognitive deficits noted   Pain Assessment   Patient Currently in Pain Yes, see Vital Sign flowsheet   Integumentary/Edema   Integumentary/Edema no deficits were identifed   Posture    Posture Protracted shoulders;Kyphosis   Range of Motion (ROM)   Range of Motion ROM deficits secondary to pain   Strength (Manual Muscle Testing)   Strength (Manual Muscle Testing) Deficits observed during functional  mobility   Bed Mobility   Comment, (Bed Mobility) pt unable to initiate bed mobility without cues for proper technique with spinal precautions   Transfers   Comment, (Transfers) sit to stand with walker and min assist   Gait/Stairs (Locomotion)   Distance in Feet (Gait) 50'   Comment, (Gait/Stairs) gait with wh walker and CGA 5'   Balance   Balance Comments mildly unsteady in standing   Sensory Examination   Sensory Perception patient reports no sensory changes   Coordination   Coordination no deficits were identified   Muscle Tone   Muscle Tone no deficits were identified   Clinical Impression   Criteria for Skilled Therapeutic Intervention Yes, treatment indicated   PT Diagnosis (PT) impaired functional mobility   Influenced by the following impairments deconditioning, pain with mobility, LE weakness   Functional limitations due to impairments fall risk, decreased IND with mobility   Clinical Presentation (PT Evaluation Complexity) Stable/Uncomplicated   Clinical Presentation Rationale per medical record   Clinical Decision Making (Complexity) low complexity   Planned Therapy Interventions (PT) balance training;gait training;transfer training   Anticipated Equipment Needs at Discharge (PT)   (defer to TCU)   Risk & Benefits of therapy have been explained evaluation/treatment results reviewed;care plan/treatment goals reviewed   PT Total Evaluation Time   PT Eval, Low Complexity Minutes (62358) 15   Physical Therapy Goals   PT Frequency 4x/week   PT Predicted Duration/Target Date for Goal Attainment 07/22/23   PT Goals Bed Mobility;Transfers;Gait   PT: Bed Mobility Independent;Supine to/from sit;Within precautions  (spinal precautions)   PT: Transfers Modified independent;Sit to/from stand;Bed to/from chair;Assistive device   PT: Gait Modified independent;Assistive device;100 feet   Interventions   Interventions Quick Adds Gait Training;Therapeutic Activity   Therapeutic Activity   Therapeutic Activities: dynamic  activities to improve functional performance Minutes (29511) 12   Symptoms Noted During/After Treatment Fatigue;Increased pain   Treatment Detail/Skilled Intervention PT - pt educated regarding T3 burst fracture, spinal precautions, log rolling and TLSO brace needed when OOB. Rolling with cues only and rail, supine to sit with cues and SBA. Sat on EOB x 8 minutes INDly, dependent to don TLSO, taught pt how to don TLSO. After gait training, back to bed, sitting on EOB pt instructed on doffing TLSO, has some difficulty d/t arthritis in hands and strength of the velcro, but able to assist in doffing TLSO. Dtr present and supportive, discussed d/c plans. Anticipate pt will need assist 1 with TLSO and mobility at discharge. Dtr said family could possibly arrange that, but pt and family would prefer TCU for further strengthening to maximize pt's IND and safety at home.   Gait Training   Gait Training Minutes (35290) 15   Symptoms Noted During/After Treatment (Gait Training) fatigue;increased pain   Treatment Detail/Skilled Intervention PT - gt training with wh walker and CGA 1, w/c follow with another, which pt didn't need. Slow gait with flexed knees, pt reports LE's feel weak. Amb 50'   [place] Level (Gait Training) contact guard   Physical Assistance Level (Gait Training) 1 person + 1 person to manage equipment   PT Discharge Planning   PT Plan cont with gait training and mobility, issue proper posture handout, work on donning/doffing TLSO   PT Discharge Recommendation (DC Rec) Transitional Care Facility;home with assist;home with home care physical therapy   PT Rationale for DC Rec Pt is normally very IND with mobility, lives in IND living apt with no services, amb without AD except when walking long distances outside apt building. Currently pt is painful and needs assist to don/doff brace and assist 1 for mobility and limited gait. Recommend assist at discharge at home vs TCU, after discussion with dtr and pt,  they prefer TCU for strengthening and mobility.   PT Brief overview of current status min assist 1 for mobility including donning/doffing TLSO, amb 50' with walker and min assist 1   Total Session Time   Timed Code Treatment Minutes 27   Total Session Time (sum of timed and untimed services) 42     [facility]  OUTPATIENT PHYSICAL THERAPY EVALUATION  PLAN OF TREATMENT FOR OUTPATIENT REHABILITATION  (COMPLETE FOR INITIAL CLAIMS ONLY)  Patient's Last Name, First Name, M.I.  YOB: 1940  Leesa Jacinto                        Provider's Name  [facility] Medical Record No.  [id number]                             Onset Date:  07/14/23   Start of Care Date:      Type:     _X_PT   ___OT   ___SLP Medical Diagnosis:                 PT Diagnosis:  impaired functional mobility Visits from SOC:  1     See note for plan of treatment, functional goals and certification details    I CERTIFY THE NEED FOR THESE SERVICES FURNISHED UNDER        THIS PLAN OF TREATMENT AND WHILE UNDER MY CARE     (Physician co-signature of this document indicates review and certification of the therapy plan).

## 2023-07-15 NOTE — CONSULTS
Care Management Initial Consult    General Information  Assessment completed with: Patient,    Type of CM/SW Visit: Initial Assessment    Primary Care Provider verified and updated as needed: Yes   Readmission within the last 30 days: no previous admission in last 30 days      Reason for Consult: discharge planning  Advance Care Planning:            Communication Assessment  Patient's communication style: spoken language (English or Bilingual)    Hearing Difficulty or Deaf: yes        Cognitive  Cognitive/Neuro/Behavioral: WDL        Orientation: oriented x 4             Living Environment:   People in home: alone     Current living Arrangements: house      Able to return to prior arrangements: yes       Family/Social Support:  Care provided by: self  Provides care for: no one  Marital Status: Single             Description of Support System: Supportive, Involved    Support Assessment: Adequate family and caregiver support, Adequate social supports    Current Resources:   Patient receiving home care services:       Community Resources:    Equipment currently used at home: walker, rolling  Supplies currently used at home:      Employment/Financial:  Employment Status:          Financial Concerns:             Does the patient's insurance plan have a 3 day qualifying hospital stay waiver?  Yes   Will the waiver be used for post-acute placement? No    Lifestyle & Psychosocial Needs:  Social Determinants of Health     Tobacco Use: Not on file   Alcohol Use: Not on file   Financial Resource Strain: Not on file   Food Insecurity: Not on file   Transportation Needs: Not on file   Physical Activity: Not on file   Stress: Not on file   Social Connections: Not on file   Intimate Partner Violence: Not on file   Depression: Not on file   Housing Stability: Not on file       Functional Status:  Prior to admission patient needed assistance:              Mental Health Status:          Chemical Dependency Status:                 Values/Beliefs:  Spiritual, Cultural Beliefs, [employer] Practices, Values that affect care:                 Additional Information:  Writer received consult for discharge planning. [facility] a 83 [facility] a history of PAF, cerebral artery occlusion who was admitted on 7/14/2023 following a fall at home this morning. Patient was found to have a burst compression fracture of T3.     Writer met with patient and introduced self and role. Patient states that she currently lives alone in an apartment in [place]. Patient states she is living in the Independent Living and does not get any services. Patient was mostly independent before this fall and states she uses a walker at baseline. Patient verified PCP as  and it is correct on the face sheet. Writer went over recommendations for TCU vs Home care. Patient would like to go to TCU as she would like to get stronger. Patient is currently Observation status. Patient has [employer] Medicare Advantage. Writer explained that most Medicare plans require a 3-night stay inpatient but a lot of the Medicare Advantage Plans do not require that but do require a prior authorization and insurance companies look to see if they feel the stay is medically necessary and can provide authorization for coverage. Patient voiced understanding and would like to send referrals to see if she can get authorization. Writer provided [employer] TCU list and explained Medicare.gov and the ratings and patient would like something in the [facility]. Patient would like SW to send referrals to [facility], [facility], [facility] and . Writer sent referrals.     When patient is ready to discharge she feels as if he children could transport her to the facility.    SW will continue to follow      AUSTYN Carter

## 2023-07-15 NOTE — PROGRESS NOTES
PRIMARY DIAGNOSIS: ACUTE PAIN   OUTPATIENT/OBSERVATION GOALS TO BE MET BEFORE DISCHARGE:  1. Pain Status: Improved but still requiring IV narcotics.    2. Return to near baseline physical activity: No    3. Cleared for discharge by consultants (if involved): No    Discharge Planner Nurse   Safe discharge environment identified: No  Barriers to discharge: Yes       Entered by: Emily Lopez RN 07/14/2023 8:09 PM     Please review provider order for any additional goals.   Nurse to notify provider when observation goals have been met and patient is ready for discharge.

## 2023-07-15 NOTE — PROGRESS NOTES
[facility]    Medicine Progress Note - Hospitalist Service    Date of Admission:  7/14/2023    Assessment & Plan   Leesa Jacinto  Is a very pleasant 83 years old female with past medical history of osteoporosis, proximal atrial fibrillation, on chronic anticoagulation, peripheral neuropathy, hypertension, hypothyroidism, anxiety, cardiomyopathy, hyperlipidemia and history of CVA was admitted on July 14, 2023 after a fall and acute back pain.    Fall at home landing on her back  Sustaining moderate breast compression fracture T3, per CT scan  Upper back/between her shoulder blades pain  -CT scan of chest, abdomen and pelvics  -Compression deformity at T3, T12 and L2 with age indeterminant  -Incidental finding of few pulmonary nodule  -Discussed with patient about the finding.  -Recommend for patient to follow-up with her primary doctor for a recheck of CT scan needs.  -Currently patient remained asymptomatic for respiratory issue.  -Pain control, cautious with narcotic and elderly  -Recommend alternating with Tylenol  -Added Flexeril for spasm pain.  -Seen by a neurosurgeon team back brace ordered and delivered  -Lifting restriction between 5 and 10 pounds per neurosurgery recommendation.  -Per neurosurgery team can resume PTA anticoagulation  -Reviewed physical therapy progress note recommend TCU    History of CVA  On chronic Eliquis to  Paroxysmal atrial fibrillation  Takotsubo cardiomyopathy in 2015. resolved   -11/2019 MRI/MRA multiple small areas of restricted diffusion in the left superior frontal gyrus and in the left frontal lobe periventricular white matter anterior to left frontal horn as well as multisegmental severe narrowing of the bilateral A2 anterior cerebral artery segments and moderate narrowing of the right M2 MCA inferior division (admit to [facility] for CVA) stroke thought secondary to intracranial atherosclerosis with left PRISCA cerebral infarction. With  "bilateral PRISCA stenosis.  Initially on ASA and plavix   - 8/21/2022 presentation with left arm and leg weakness and limb shaking of LLE   Imaging negative for acute stroke including CT/MRI :  Diagnosed with limb shaking TIA ; started on pletal   -Started on eliquis 6/2022 after cardiology visit with PAF and strokes.   We will resume Eliquis at per neurosurgery recommendation.  -No hematoma or vital signs abnormality noted.    History of recurrent cystitis  -No antibiotics due to risk of colitis  -Started probiotic  -Patient continues home medication over-the-counter \"Azo\"  -Pharmacy to review prior to dispensing over-the-counter home medication.    Hypothyroidism  Idiopathic peripheral neuropathy  History of osteoporosis     Diet: Combination Diet Regular Diet Adult    DVT Prophylaxis: We will resume home medication Eliquis to 2.5 mg twice a day.  Limon Catheter: Not present  Lines: None     Cardiac Monitoring: None  Code Status: No CPR- Do NOT Intubate      Clinically Significant Risk Factors Present on Admission   Chronic anticoagulation            # Drug Induced Coagulation Defect: home medication list includes an anticoagulant medication  # Drug Induced Platelet Defect: home medication list includes an antiplatelet medication                 Disposition Plan  TCU once stable.  Anticipate July 17, 2023.     Expected Discharge Date: 07/17/2023      Destination: other (comment) (TCU)  Discharge Comments: CC/SW/PT  orthodics consult for a brace  NeuroSurgery followinf        The patient's care was discussed with the Attending Physician, Dr. Argueta, Bedside Nurse and Patient.    TANYA Torres [facility]  Hospitalist Service  [facility]  Securely message with [employer] (more info)  Text page via [employer] Paging/Directory   ______________________________________________________________________    Interval History   Leesa Jacinto  Is a very pleasant 83 years old female with past medical " "history of osteoporosis, proximal atrial fibrillation, on chronic anticoagulation, peripheral neuropathy, hypertension, hypothyroidism, anxiety, cardiomyopathy, hyperlipidemia and history of CVA was admitted on July 14, 2023 after a fall and acute back pain.  Patient seen today for a follow-up and patient is able to communicate and express her wishes.  Patient complaining of upper back between the shoulder blade pain at times up to 7 out of 10.  She stated with movement pain is sharp and ongoing.  She denies any headache, visual disturbance, nausea, vomiting, fever, chill, dizziness, cough, shortness of breath, heart palpitation or generalized weakness.  She tells me she has a history of recurrent UTI and her primary doctor recommend none antibiotics treatment.  She states she was taking over-the-counter medication called \"AZO\" she states relieves the burning sensation with urination.  Patient is advised to hydrate appropriately and not walk around with a full bladder.  Regarding back brace, patient is advised to use back brace as ordered until she sees the neurosurgeon team in about 6 weeks.  Discussed with patient regarding incidental finding of few pulmonary nodule,  Patient remained asymptomatic from a respiratory standpoint.  Patient understood and agreed with the plan.  Informed patient to follow-up with her primary doctor, after discharge for a repeat of CT scan of the chest if she becomes symptomatic or follow-up in 1 year.  Patient understood and agreed with the plan.    Physical Exam   Vital Signs: Temp: 98  F (36.7  C) Temp src: Oral BP: 111/56 Pulse: 88   Resp: 16 SpO2: 96 % O2 Device: None (Room air)    Weight: 100 lbs 12 oz    Constitutional: awake, alert, cooperative, no apparent distress, and appears stated age  Eyes: Lids and lashes normal, pupils equal, round and reactive to light, extra ocular muscles intact, sclera clear, conjunctiva normal  ENT: normocepalic, without obvious " abnormality  Hematologic / Lymphatic: no cervical lymphadenopathy  Respiratory: No increased work of breathing, good air exchange, clear to auscultation bilaterally, no crackles or wheezing  Cardiovascular: S1-S2 intact.  Circulation is intact.  No pitting edema noted lower extremity.  GI: Flat, bowel sound positive nontender nondistended.  Skin: No acute rash noted on exposed skin.  Warm and dry.  Musculoskeletal: no lower extremity pitting edema present  Neurologic: Alert and oriented x4.  No focal deficit present.    Medical Decision Making     30 MINUTES SPENT BY ME on the date of service doing chart review, history, exam, documentation & further activities per the note.      Data     I have personally reviewed the following data over the past 24 hrs:    6.9  \   11.1 (L)   / 169     139 107 24.5 (H) /  95   3.8 22 1.03 (H) \       ALT: 81 (H) AST: 106 (H) AP: 92 TBILI: 0.9   ALB: 3.5 TOT PROTEIN: 5.6 (L) LIPASE: N/A       Imaging results reviewed over the past 24 hrs:   Recent Results (from the past 24 hour(s))   Thoracic spine MRI w/o contrast    Narrative    MRI OF THE THORACIC SPINE WITHOUT CONTRAST  7/14/2023 4:18 PM     COMPARISON: CT thoracic spine same day    HISTORY: T3 fracture with retropulsion    TECHNIQUE: Multiplanar, multisequence MRI images of the thoracic spine  were acquired without gadolinium IV contrast.      Impression    IMPRESSION:   1. Normal alignment. Recent-appearing moderate burst compression  fracture deformity of the T3 vertebral body again noted.  Chronic-appearing fracture of the T12 vertebral body again noted.  There is a mixed signal intensity nodule in the T8 vertebral body that  could represent an atypical hemangioma. There is bone marrow edema in  the T3 vertebral body consistent with recent fracture. There is bone  marrow edema in the T2 vertebral body possibly due to altered  biomechanics at T2-T3. Marrow signal otherwise normal. No other  evidence for fracture or  pathologic bony lesion.  2. No significant posterior disc bulges or herniations in the thoracic  spine.  3. Normal-appearing thoracic spinal cord.  4. No spinal canal stenosis.    KAMERON BECERRA MD         SYSTEM ID:  [id number]

## 2023-07-15 NOTE — PROGRESS NOTES
Neurosurgery progress note:    Patient continues to have back pain but states it's better with pain medication.  Received her brace and fells it helps with her pain as well.  No new issues.  MRI thoracic spine completed yesterday without signs of hematoma.    Exam stable.      MRI thoracic spine:    IMPRESSION:   1. Normal alignment. Recent-appearing moderate burst compression  fracture deformity of the T3 vertebral body again noted.  Chronic-appearing fracture of the T12 vertebral body again noted.  There is a mixed signal intensity nodule in the T8 vertebral body that  could represent an atypical hemangioma. There is bone marrow edema in  the T3 vertebral body consistent with recent fracture. There is bone  marrow edema in the T2 vertebral body possibly due to altered  biomechanics at T2-T3. Marrow signal otherwise normal. No other  evidence for fracture or pathologic bony lesion.  2. No significant posterior disc bulges or herniations in the thoracic  spine.  3. Normal-appearing thoracic spinal cord.  4. No spinal canal stenosis.    RECOMMENDATIONS:  Okay to resume anti-coagulation from NS standpoint.  TLSO brace to be worn when out of bed  Follow up in NS clinic in 6 weeks with thoracic x-ray's day of appt.  No lifting greater than 5-10 lbs.    ILAN Egan  [facility]  [address] [address]    Tel [phone]  Pager [phone]

## 2023-07-15 NOTE — PROGRESS NOTES
PRIMARY DIAGNOSIS: ACUTE PAIN   OUTPATIENT/OBSERVATION GOALS TO BE MET BEFORE DISCHARGE:  1. Pain Status: Improved but still requiring IV narcotics.    2. Return to near baseline physical activity: No    3. Cleared for discharge by consultants (if involved): No    Discharge Planner Nurse   Safe discharge environment identified: No  Barriers to discharge: Yes       Entered by: Neda Cabrera RN 07/15/2023 6:11 AM     Please review provider order for any additional goals.   Nurse to notify provider when observation goals have been met and patient is ready for discharge.

## 2023-07-15 NOTE — PROVIDER NOTIFICATION
MD Notification    Notified Person: MD    Notified Person Name: MADELEINE VUONG    Notification Date/Time:7/14/23 1923     Notification Interaction:Text    Purpose of Notification:Pt is requesting lidocaine patch for back pain if not contraindicated. Please advise. Thank you.     Orders Received:pending    Comments:

## 2023-07-15 NOTE — PROVIDER NOTIFICATION
Notified Person: PA    Notified Person Name: Evon Smith (neurosurgery)    Notification Date/Time: 0150 7/15/23    Notification Interaction: answering service    Purpose of Notification:   T3 burst compression fracture. pt having new marck tingling and decreased sensation in both feet. VSS, other neuros intact. please advise. thanks!     Orders Received: awaiting callback    Comments: Overnight hospitalist aware. see previous provider notification.      ADDENDUM 0203: Discussed w/ Evon over phone. Pt currently sleeping. Plan to monitor for now, if symptoms worsen or new symptoms arise re-page Evon. Next scheduled neuro check at 0400.

## 2023-07-15 NOTE — PROGRESS NOTES
[id number]:  Shortly before 0100 pt started complaining of decreased sensation and tingling in bilateral feet. Hospitalist and neurosurg updated (see previous notes). At next neurocheck 0400, pt reported return of full sensation of bilateral feet and denied tingling. No other acute events overnight. VSS. All other neuros unchanged this shift.    Orientation/Cognitive: A&O X4  Observation Goals (Met/ Not Met): Not met  Mobility Level/Assist Equipment: A2 lift; thoracic spine precautions. Bedrest and lay flat.   Fall Risk (Y/N): Y  Behavior Concerns: green  Pain Management: back/between shoulder pain controlled w/ scheduled tylenol and PRN oxy. IV dilaudid given x1.    Tele/VS/O2: No tele, VSS, O2 RA  ABNL Lab/BG: hematocrit 34.5  Diet: Reg  Bowel/Bladder: purewick in place; adequate output   Skin Concerns: skin abrasion on L knee from fall  Drains/Devices: PIV SL, purewick   Tests/Procedures for next shift: AM labs, SW, PT, Orthoosis.   Anticipated DC date & active delays: pending clinical progress

## 2023-07-15 NOTE — PROGRESS NOTES
Patient needs to have pulmonary nodules addressed tomorrow with the patient to get follow up and need to address.   Will need to have the day team hospital team to  discuss further.   Patient asleep and did not wake her up to discuss   Dr. Nga Phelan

## 2023-07-15 NOTE — PROGRESS NOTES
Orientation/Cognitive: A&O X4  Observation Goals (Met/ Not Met): Not met  Mobility Level/Assist Equipment: A2 lift order to keep flat  Fall Risk (Y/N): Y  Behavior Concerns: green  Pain Management: PRN Oxycodone X1, Lidocaine patch, scheduled tylenol X1   Tele/VS/O2: No tele, VSS, O2 RA  ABNL Lab/BG: hematocrit 34.5  Diet: Reg  Bowel/Bladder: Continent  Skin Concerns: skin abrasion on L knee from fall  Drains/Devices: PIV SL, jackie   Tests/Procedures for next shift: AM labs, SW, PT, Ortho brace  Anticipated DC date & active delays: TBD

## 2023-07-16 ENCOUNTER — APPOINTMENT (OUTPATIENT)
Dept: PHYSICAL THERAPY | Facility: CLINIC | Age: 83
DRG: 551 | End: 2023-07-16
Payer: COMMERCIAL

## 2023-07-16 LAB
ALBUMIN UR-MCNC: 30 MG/DL
APPEARANCE UR: ABNORMAL
BACTERIA #/AREA URNS HPF: ABNORMAL /HPF
BILIRUB UR QL STRIP: NEGATIVE
COLOR UR AUTO: ABNORMAL
GLUCOSE UR STRIP-MCNC: NEGATIVE MG/DL
HGB UR QL STRIP: ABNORMAL
KETONES UR STRIP-MCNC: NEGATIVE MG/DL
LEUKOCYTE ESTERASE UR QL STRIP: ABNORMAL
MUCOUS THREADS #/AREA URNS LPF: PRESENT /LPF
NITRATE UR QL: POSITIVE
PH UR STRIP: 5.5 [PH] (ref 5–7)
RBC URINE: 4 /HPF
SP GR UR STRIP: 1.02 (ref 1–1.03)
SQUAMOUS EPITHELIAL: 3 /HPF
UROBILINOGEN UR STRIP-MCNC: 2 MG/DL
WBC CLUMPS #/AREA URNS HPF: PRESENT /HPF
WBC URINE: >182 /HPF

## 2023-07-16 PROCEDURE — G0378 HOSPITAL OBSERVATION PER HR: HCPCS

## 2023-07-16 PROCEDURE — 250N000013 HC RX MED GY IP 250 OP 250 PS 637: Performed by: NURSE PRACTITIONER

## 2023-07-16 PROCEDURE — 250N000013 HC RX MED GY IP 250 OP 250 PS 637: Performed by: INTERNAL MEDICINE

## 2023-07-16 PROCEDURE — 250N000011 HC RX IP 250 OP 636: Mod: JZ | Performed by: INTERNAL MEDICINE

## 2023-07-16 PROCEDURE — 81001 URINALYSIS AUTO W/SCOPE: CPT | Performed by: NURSE PRACTITIONER

## 2023-07-16 PROCEDURE — 87186 SC STD MICRODIL/AGAR DIL: CPT | Performed by: NURSE PRACTITIONER

## 2023-07-16 PROCEDURE — 250N000011 HC RX IP 250 OP 636: Performed by: NURSE PRACTITIONER

## 2023-07-16 PROCEDURE — 96376 TX/PRO/DX INJ SAME DRUG ADON: CPT

## 2023-07-16 PROCEDURE — 97530 THERAPEUTIC ACTIVITIES: CPT | Mod: GP | Performed by: PHYSICAL THERAPIST

## 2023-07-16 PROCEDURE — 96375 TX/PRO/DX INJ NEW DRUG ADDON: CPT

## 2023-07-16 PROCEDURE — 99231 SBSQ HOSP IP/OBS SF/LOW 25: CPT | Performed by: NURSE PRACTITIONER

## 2023-07-16 PROCEDURE — 97116 GAIT TRAINING THERAPY: CPT | Mod: GP | Performed by: PHYSICAL THERAPIST

## 2023-07-16 RX ORDER — CEFTRIAXONE 1 G/1
1 INJECTION, POWDER, FOR SOLUTION INTRAMUSCULAR; INTRAVENOUS EVERY 24 HOURS
Status: DISCONTINUED | OUTPATIENT
Start: 2023-07-16 | End: 2023-07-18

## 2023-07-16 RX ORDER — VANCOMYCIN HYDROCHLORIDE 125 MG/1
125 CAPSULE ORAL DAILY
Status: DISCONTINUED | OUTPATIENT
Start: 2023-07-17 | End: 2023-07-17

## 2023-07-16 RX ORDER — PHENAZOPYRIDINE HYDROCHLORIDE 100 MG/1
97.5 TABLET, FILM COATED ORAL 3 TIMES DAILY PRN
Status: DISCONTINUED | OUTPATIENT
Start: 2023-07-16 | End: 2023-07-16

## 2023-07-16 RX ADMIN — APIXABAN 2.5 MG: 2.5 TABLET, FILM COATED ORAL at 08:44

## 2023-07-16 RX ADMIN — ATORVASTATIN CALCIUM 20 MG: 20 TABLET, FILM COATED ORAL at 20:31

## 2023-07-16 RX ADMIN — ACETAMINOPHEN 650 MG: 325 TABLET, FILM COATED ORAL at 17:59

## 2023-07-16 RX ADMIN — OXYCODONE HYDROCHLORIDE 5 MG: 5 TABLET ORAL at 13:15

## 2023-07-16 RX ADMIN — CILOSTAZOL 100 MG: 100 TABLET ORAL at 20:31

## 2023-07-16 RX ADMIN — MULTIPLE VITAMINS W/ MINERALS TAB 1 TABLET: TAB at 08:44

## 2023-07-16 RX ADMIN — FLUOXETINE 40 MG: 20 CAPSULE ORAL at 20:31

## 2023-07-16 RX ADMIN — SENNOSIDES 8.6 MG: 8.6 TABLET, FILM COATED ORAL at 08:44

## 2023-07-16 RX ADMIN — CEFTRIAXONE SODIUM 1 G: 1 INJECTION, POWDER, FOR SOLUTION INTRAMUSCULAR; INTRAVENOUS at 23:21

## 2023-07-16 RX ADMIN — SENNOSIDES 8.6 MG: 8.6 TABLET, FILM COATED ORAL at 20:31

## 2023-07-16 RX ADMIN — APIXABAN 2.5 MG: 2.5 TABLET, FILM COATED ORAL at 20:31

## 2023-07-16 RX ADMIN — ACETAMINOPHEN 650 MG: 325 TABLET, FILM COATED ORAL at 12:42

## 2023-07-16 RX ADMIN — ACETAMINOPHEN 650 MG: 325 TABLET, FILM COATED ORAL at 06:11

## 2023-07-16 RX ADMIN — GABAPENTIN 300 MG: 300 CAPSULE ORAL at 22:14

## 2023-07-16 RX ADMIN — Medication 50 MCG: at 20:30

## 2023-07-16 RX ADMIN — Medication 1 CAPSULE: at 08:44

## 2023-07-16 RX ADMIN — HYDROMORPHONE HYDROCHLORIDE 0.3 MG: 1 INJECTION, SOLUTION INTRAMUSCULAR; INTRAVENOUS; SUBCUTANEOUS at 23:21

## 2023-07-16 RX ADMIN — CILOSTAZOL 100 MG: 100 TABLET ORAL at 08:48

## 2023-07-16 RX ADMIN — Medication 1 CAPSULE: at 20:31

## 2023-07-16 RX ADMIN — OXYCODONE HYDROCHLORIDE 5 MG: 5 TABLET ORAL at 22:14

## 2023-07-16 ASSESSMENT — ACTIVITIES OF DAILY LIVING (ADL)
ADLS_ACUITY_SCORE: 43
ADLS_ACUITY_SCORE: 45
ADLS_ACUITY_SCORE: 45
ADLS_ACUITY_SCORE: 43
ADLS_ACUITY_SCORE: 43
ADLS_ACUITY_SCORE: 45
ADLS_ACUITY_SCORE: 43
ADLS_ACUITY_SCORE: 45
ADLS_ACUITY_SCORE: 43
ADLS_ACUITY_SCORE: 43

## 2023-07-16 NOTE — PROGRESS NOTES
Medicine Progress Note - Hospitalist Service    Date of Admission:  7/14/2023    Assessment & Plan   Leesa Jacinto  Is a very pleasant 83 years old female with past medical history of osteoporosis, proximal atrial fibrillation, on chronic anticoagulation, peripheral neuropathy, hypertension, hypothyroidism, anxiety, cardiomyopathy, hyperlipidemia and history of CVA was admitted on July 14, 2023 after a fall and acute back pain.    Fall at home landing on her back  Sustaining moderate breast compression fracture T3, per CT scan  Upper back/between her shoulder blades pain  -CT scan of chest, abdomen and pelvics  -Compression deformity at T3, T12 and L2 with age indeterminant  -Incidental finding of few pulmonary nodule  -Discussed with patient about the finding.  -Recommend for patient to follow-up with her primary doctor for a recheck of CT scan needs.  -Currently patient remained asymptomatic for respiratory issue.  -Pain control, cautious with narcotic and elderly  -Recommend alternating with Tylenol  -Added Flexeril for spasm pain.  -Continue to use back brace as ordered.  -Education provided regarding back brace to be used as ordered for the treatment of compression deformity at T3.  -Lifting restriction between 5 and 10 pounds per neurosurgery recommendation.  -Per neurosurgery team on Eliquis resume home medication.  -Physical therapy progress note recommend TCU    Gross hematuria noted in the drrain canister.  History of recurrent UTI  Managed by primary doctor  Prefers to avoid antibiotics, due to risk of colitis.  -Regularly uses over-the-counter medication.  Okay to use home medication, after pharmacy review.  -Probiotic started yesterday.  -No confusion appreciated, mild burning with urination reported  -Reported incontinent new per patient report.  -Recommend to check a UA UC if indicated.    History of CVA  On chronic Eliquis to  Paroxysmal atrial  fibrillation  Takotsubo cardiomyopathy in 2015. resolved   -11/2019 MRI/MRA multiple small areas of restricted diffusion in the left superior frontal gyrus and in the left frontal lobe periventricular white matter anterior to left frontal horn as well as multisegmental severe narrowing of the bilateral A2 anterior cerebral artery segments and moderate narrowing of the right M2 MCA inferior division (admit to [facility] for CVA) stroke thought secondary to intracranial atherosclerosis with left PRISCA cerebral infarction. With bilateral PRISCA stenosis.  Initially on ASA and plavix   - 8/21/2022 presentation with left arm and leg weakness and limb shaking of LLE   Imaging negative for acute stroke including CT/MRI :  Diagnosed with limb shaking TIA ; started on pletal   -Started on eliquis 6/2022 after cardiology visit with PAF and strokes.   Eliquis, per neurosurgery recommendation.  -No hematoma or vital signs abnormality noted.    Hypothyroidism  Idiopathic peripheral neuropathy  History of osteoporosis       Diet: Combination Diet Regular Diet Adult    DVT Prophylaxis: On Eliquis at home medication.  Limon Catheter: Not present  Lines: None     Cardiac Monitoring: None  Code Status: No CPR- Do NOT Intubate      Clinically Significant Risk Factors Present on Admission   Recurrent chronic cystitis            # Drug Induced Coagulation Defect: home medication list includes an anticoagulant medication  # Drug Induced Platelet Defect: home medication list includes an antiplatelet medication                 Disposition Plan  TCU awaiting placement.     Expected Discharge Date: 07/18/2023      Destination: other (comment) (TCU)  Discharge Comments: CC/SW/PT  TLSO brace  NeuroSurgery following  TCU has [employer]        The patient's care was discussed with the Attending Physician, Dr. Argueta, Bedside Nurse and Patient.    TANYA Torres [facility]  Hospitalist Service  [facility]  Securely message with [employer]  (more info)  Text page via [facility] Paging/Directory   ______________________________________________________________________    Interval History   Leesa ROWLAND  Is a very pleasant 83 years old female with past medical history of osteoporosis, proximal atrial fibrillation, on chronic anticoagulation, peripheral neuropathy, hypertension, hypothyroidism, anxiety, cardiomyopathy, hyperlipidemia and history of CVA was admitted on July 14, 2023 after a fall and acute back pain.  Patient seen today for a follow-up and patient is able to communicate and express her wishes.  She tells me she still feel burning with urination and also last night she was incontinent.  She states this is new.  She denies any confusion, nausea or vomiting.  She tells me that she used back brace and also ambulated and she felt comfortable walking.  She understood about the recommendation of lifting restriction from 5 to 10 pounds and follow-up appointment with neurosurgery.  With the gross hematuria, burning with urination and incontinent reportedly new, will check urine for acute bacterial.  Patient understood and agreed with the plan.  Physical Exam   Vital Signs: Temp: 98.1  F (36.7  C) Temp src: Oral BP: 98/55 Pulse: 100   Resp: 18 SpO2: 95 % O2 Device: None (Room air)    Weight: 100 lbs 12 oz    Constitutional: awake, alert, cooperative, no apparent distress, and appears stated age  Eyes: Conjunctive and sclera clear.  Respiratory: No increased work of breathing, good air exchange, clear to auscultation bilaterally, no crackles or wheezing  Cardiovascular: S1-S2 intact.  No pitting edema noted lower extremity.  GI: Bowel sound positive nontender nondistended.  Genitounirinary: Bladder:  Tenderness absent, gross hematuria noted in the draining canister  Skin: No acute rash noted on exposed skin.  Warm and dry.  Musculoskeletal: no lower extremity pitting edema present  Neurologic: No confusion appreciated at this visit.    Medical Decision  Making     25 MINUTES SPENT BY ME on the date of service doing chart review, history, exam, documentation & further activities per the note.      Data       Imaging results reviewed over the past 24 hrs:   No results found for this or any previous visit (from the past 24 hour(s)).

## 2023-07-16 NOTE — PROGRESS NOTES
Date/Time: 7/15   [id number]   Summary:  Closed fracture of t3   Mental Status: A/Ox4  Activity/dangle:A1/GB/walker; w back brace on  Diet: regular  Pain: managed with scheduled Tylenol PRN oxycodone,and Lidocaine patch  Limon/Voiding: Purewick  Skin:  intact  02/LDA: VSS on RA, PIV SL  D/C Date: pending TCU placement  Other Info: CMS  and Neuro intact intact.pills whole.baseline LE neropathy.[place] utilizes hearing aids(bilaterally)

## 2023-07-16 NOTE — PROGRESS NOTES
Pt is A&O x4. VSS on room air. Up with 1 with use of gait belt, walker and back brace.  Pain managed with Scheduled Tylenol. Purewick in place. Saline Lock patent. CMS intact. [place] uses bilateral hearing aids. Tolerating a regular diet.

## 2023-07-16 NOTE — UTILIZATION REVIEW
Concurrent stay review; Secondary Review Determination - [facility]        Under the authority of the Utilization Management Committee, the utilization review process indicated a secondary review on the above patient.  The review outcome is based on review of the medical records, discussions with staff, and applying clinical experience noted on the date of the review.        (x) Observation/outpatient Status Appropriate - Concurrent stay review       RATIONALE FOR DETERMINATION:   83-year-old female with a history of osteoporosis, atrial fibrillation, peripheral neuropathy, hypertension, hypothyroidism, anxiety, cardiomyopathy, and hyperlipidemia was admitted after a fall resulting in a moderate breast compression fracture at T3. CT scan revealed compression deformities at T3, T12, and L2, as well as incidental pulmonary nodules. Pain management was initiated with cautious use of narcotics and the addition of Flexeril for spasms. A back brace was ordered, lifting restrictions were advised, and Eliquis was resumed for anticoagulation. Gross hematuria was noted in the drain canister, and the patient's history of recurrent urinary tract infections was managed with over-the-counter medication and probiotics. No confusion was observed, and a urinalysis and urine culture were recommended if indicated. The patient has received adequate pain management, has no signs of respiratory issues, and does not require intensive monitoring or interventions.  Patient delayed discharge is related to disposition, there is no medical necessity for inpatient admission at the time of this review. If there is a change in patient status, please resend for review.    The information on this document is developed by the utilization review team in order for the business office to ensure compliance.  This only denotes the appropriateness of proper admission status and does not reflect the quality of care rendered.        The definitions of Inpatient Status and Observation Status used in making the determination above are those provided in the CMS Coverage Manual, Chapter 1 and Chapter 6, section 70.4.       Sincerely,    Leoncio Drew MD

## 2023-07-16 NOTE — PROGRESS NOTES
Care Management Follow Up    Length of Stay (days): 0    Expected Discharge Date: 07/18/2023     Concerns to be Addressed: discharge planning     Patient plan of care discussed at interdisciplinary rounds: Yes    Anticipated Discharge Disposition: Transitional Care     Anticipated Discharge Services: None  Anticipated Discharge DME: None    Patient/family educated on Medicare website which has current facility and service quality ratings: yes  Education Provided on the Discharge Plan:    Patient/Family in Agreement with the Plan: yes    Referrals Placed by CM/SW: Post Acute Facilities  Private pay costs discussed: Not applicable    Additional Information:  SW had a message from nursing staff that patient wanted to see SW staff. Patient was curious about her placement in TCU and looking for updates, patient is most interested in staying close to [facility] and her family can provide transportation. SW looked in Epic and relayed information about the pending referrals and that we most likely will not know until tomorrow. Patient very appreciative for the update.       AUSTYN Morales

## 2023-07-17 ENCOUNTER — APPOINTMENT (OUTPATIENT)
Dept: ULTRASOUND IMAGING | Facility: CLINIC | Age: 83
DRG: 551 | End: 2023-07-17
Attending: PHYSICIAN ASSISTANT
Payer: COMMERCIAL

## 2023-07-17 ENCOUNTER — APPOINTMENT (OUTPATIENT)
Dept: PHYSICAL THERAPY | Facility: CLINIC | Age: 83
DRG: 551 | End: 2023-07-17
Payer: COMMERCIAL

## 2023-07-17 DIAGNOSIS — S22.039A: Primary | ICD-10-CM

## 2023-07-17 PROBLEM — N17.9 AKI (ACUTE KIDNEY INJURY) (H): Status: ACTIVE | Noted: 2023-07-17

## 2023-07-17 LAB
ALBUMIN SERPL BCG-MCNC: 3.7 G/DL (ref 3.5–5.2)
ALP SERPL-CCNC: 134 U/L (ref 35–104)
ALT SERPL W P-5'-P-CCNC: 67 U/L (ref 0–50)
ANION GAP SERPL CALCULATED.3IONS-SCNC: 12 MMOL/L (ref 7–15)
AST SERPL W P-5'-P-CCNC: 50 U/L (ref 0–45)
BACTERIA UR CULT: ABNORMAL
BILIRUB DIRECT SERPL-MCNC: <0.2 MG/DL (ref 0–0.3)
BILIRUB SERPL-MCNC: 0.5 MG/DL
BUN SERPL-MCNC: 26.9 MG/DL (ref 8–23)
CALCIUM SERPL-MCNC: 8.5 MG/DL (ref 8.8–10.2)
CHLORIDE SERPL-SCNC: 109 MMOL/L (ref 98–107)
CREAT SERPL-MCNC: 0.88 MG/DL (ref 0.51–0.95)
DEPRECATED HCO3 PLAS-SCNC: 20 MMOL/L (ref 22–29)
ERYTHROCYTE [DISTWIDTH] IN BLOOD BY AUTOMATED COUNT: 14 % (ref 10–15)
GFR SERPL CREATININE-BSD FRML MDRD: 64 ML/MIN/1.73M2
GLUCOSE SERPL-MCNC: 134 MG/DL (ref 70–99)
HCT VFR BLD AUTO: 34.4 % (ref 35–47)
HGB BLD-MCNC: 11.6 G/DL (ref 11.7–15.7)
MCH RBC QN AUTO: 31.6 PG (ref 26.5–33)
MCHC RBC AUTO-ENTMCNC: 33.7 G/DL (ref 31.5–36.5)
MCV RBC AUTO: 94 FL (ref 78–100)
PLATELET # BLD AUTO: 201 10E3/UL (ref 150–450)
POTASSIUM SERPL-SCNC: 3.2 MMOL/L (ref 3.4–5.3)
PROT SERPL-MCNC: 6.1 G/DL (ref 6.4–8.3)
RBC # BLD AUTO: 3.67 10E6/UL (ref 3.8–5.2)
SODIUM SERPL-SCNC: 141 MMOL/L (ref 136–145)
WBC # BLD AUTO: 6 10E3/UL (ref 4–11)

## 2023-07-17 PROCEDURE — 258N000003 HC RX IP 258 OP 636: Performed by: PHYSICIAN ASSISTANT

## 2023-07-17 PROCEDURE — 250N000013 HC RX MED GY IP 250 OP 250 PS 637: Performed by: NURSE PRACTITIONER

## 2023-07-17 PROCEDURE — 97530 THERAPEUTIC ACTIVITIES: CPT | Mod: GP

## 2023-07-17 PROCEDURE — 85027 COMPLETE CBC AUTOMATED: CPT | Performed by: PHYSICIAN ASSISTANT

## 2023-07-17 PROCEDURE — 99207 PR APP CREDIT; MD BILLING SHARED VISIT: CPT | Mod: FS | Performed by: INTERNAL MEDICINE

## 2023-07-17 PROCEDURE — 258N000003 HC RX IP 258 OP 636: Performed by: HOSPITALIST

## 2023-07-17 PROCEDURE — 250N000013 HC RX MED GY IP 250 OP 250 PS 637: Performed by: PHYSICIAN ASSISTANT

## 2023-07-17 PROCEDURE — 80053 COMPREHEN METABOLIC PANEL: CPT | Performed by: PHYSICIAN ASSISTANT

## 2023-07-17 PROCEDURE — G0378 HOSPITAL OBSERVATION PER HR: HCPCS

## 2023-07-17 PROCEDURE — 99232 SBSQ HOSP IP/OBS MODERATE 35: CPT | Performed by: PHYSICIAN ASSISTANT

## 2023-07-17 PROCEDURE — 250N000013 HC RX MED GY IP 250 OP 250 PS 637: Performed by: INTERNAL MEDICINE

## 2023-07-17 PROCEDURE — 120N000001 HC R&B MED SURG/OB

## 2023-07-17 PROCEDURE — 250N000011 HC RX IP 250 OP 636: Mod: JZ | Performed by: INTERNAL MEDICINE

## 2023-07-17 PROCEDURE — 82248 BILIRUBIN DIRECT: CPT | Performed by: PHYSICIAN ASSISTANT

## 2023-07-17 PROCEDURE — 36415 COLL VENOUS BLD VENIPUNCTURE: CPT | Performed by: PHYSICIAN ASSISTANT

## 2023-07-17 PROCEDURE — 76705 ECHO EXAM OF ABDOMEN: CPT

## 2023-07-17 PROCEDURE — 97116 GAIT TRAINING THERAPY: CPT | Mod: GP

## 2023-07-17 PROCEDURE — 82947 ASSAY GLUCOSE BLOOD QUANT: CPT | Performed by: PHYSICIAN ASSISTANT

## 2023-07-17 RX ORDER — VANCOMYCIN HYDROCHLORIDE 125 MG/1
125 CAPSULE ORAL 2 TIMES DAILY
Status: DISCONTINUED | OUTPATIENT
Start: 2023-07-17 | End: 2023-07-22

## 2023-07-17 RX ORDER — PHENAZOPYRIDINE HYDROCHLORIDE 100 MG/1
100 TABLET, FILM COATED ORAL
Status: DISCONTINUED | OUTPATIENT
Start: 2023-07-17 | End: 2023-07-17

## 2023-07-17 RX ORDER — PHENAZOPYRIDINE HYDROCHLORIDE 100 MG/1
100 TABLET, FILM COATED ORAL ONCE
Status: COMPLETED | OUTPATIENT
Start: 2023-07-17 | End: 2023-07-17

## 2023-07-17 RX ADMIN — SENNOSIDES 8.6 MG: 8.6 TABLET, FILM COATED ORAL at 21:29

## 2023-07-17 RX ADMIN — Medication 1 CAPSULE: at 10:14

## 2023-07-17 RX ADMIN — SODIUM CHLORIDE 500 ML: 9 INJECTION, SOLUTION INTRAVENOUS at 18:26

## 2023-07-17 RX ADMIN — LEVOTHYROXINE SODIUM 50 MCG: 50 TABLET ORAL at 07:00

## 2023-07-17 RX ADMIN — LIDOCAINE 1 PATCH: 560 PATCH PERCUTANEOUS; TOPICAL; TRANSDERMAL at 21:28

## 2023-07-17 RX ADMIN — CEFTRIAXONE SODIUM 1 G: 1 INJECTION, POWDER, FOR SOLUTION INTRAMUSCULAR; INTRAVENOUS at 23:09

## 2023-07-17 RX ADMIN — PHENAZOPYRIDINE HYDROCHLORIDE 100 MG: 100 TABLET ORAL at 14:04

## 2023-07-17 RX ADMIN — GABAPENTIN 300 MG: 300 CAPSULE ORAL at 21:29

## 2023-07-17 RX ADMIN — APIXABAN 2.5 MG: 2.5 TABLET, FILM COATED ORAL at 07:59

## 2023-07-17 RX ADMIN — VANCOMYCIN HYDROCHLORIDE 125 MG: 125 CAPSULE ORAL at 21:29

## 2023-07-17 RX ADMIN — SENNOSIDES 8.6 MG: 8.6 TABLET, FILM COATED ORAL at 07:59

## 2023-07-17 RX ADMIN — POLYETHYLENE GLYCOL 3350 17 G: 17 POWDER, FOR SOLUTION ORAL at 07:59

## 2023-07-17 RX ADMIN — FLUOXETINE 40 MG: 20 CAPSULE ORAL at 21:29

## 2023-07-17 RX ADMIN — MULTIPLE VITAMINS W/ MINERALS TAB 1 TABLET: TAB at 07:56

## 2023-07-17 RX ADMIN — OXYCODONE HYDROCHLORIDE 5 MG: 5 TABLET ORAL at 12:48

## 2023-07-17 RX ADMIN — CILOSTAZOL 100 MG: 100 TABLET ORAL at 21:29

## 2023-07-17 RX ADMIN — CYCLOBENZAPRINE 10 MG: 10 TABLET, FILM COATED ORAL at 15:12

## 2023-07-17 RX ADMIN — APIXABAN 2.5 MG: 2.5 TABLET, FILM COATED ORAL at 21:29

## 2023-07-17 RX ADMIN — ACETAMINOPHEN 650 MG: 325 TABLET, FILM COATED ORAL at 18:26

## 2023-07-17 RX ADMIN — ACETAMINOPHEN 650 MG: 325 TABLET, FILM COATED ORAL at 07:00

## 2023-07-17 RX ADMIN — Medication 1 CAPSULE: at 21:29

## 2023-07-17 RX ADMIN — VANCOMYCIN HYDROCHLORIDE 125 MG: 125 CAPSULE ORAL at 07:56

## 2023-07-17 RX ADMIN — CILOSTAZOL 100 MG: 100 TABLET ORAL at 07:56

## 2023-07-17 RX ADMIN — ATORVASTATIN CALCIUM 20 MG: 20 TABLET, FILM COATED ORAL at 21:29

## 2023-07-17 RX ADMIN — Medication 50 MCG: at 21:29

## 2023-07-17 RX ADMIN — SODIUM CHLORIDE 500 ML: 9 INJECTION, SOLUTION INTRAVENOUS at 12:55

## 2023-07-17 RX ADMIN — ACETAMINOPHEN 650 MG: 325 TABLET, FILM COATED ORAL at 00:14

## 2023-07-17 RX ADMIN — ACETAMINOPHEN 650 MG: 325 TABLET, FILM COATED ORAL at 12:48

## 2023-07-17 ASSESSMENT — ACTIVITIES OF DAILY LIVING (ADL)
ADLS_ACUITY_SCORE: 41
ADLS_ACUITY_SCORE: 45
ADLS_ACUITY_SCORE: 41

## 2023-07-17 NOTE — PROGRESS NOTES
Care Management Follow Up    Length of Stay (days): 0    Expected Discharge Date: 07/18/2023     Concerns to be Addressed: discharge planning     Patient plan of care discussed at interdisciplinary rounds: Yes    Anticipated Discharge Disposition: Transitional Care     Anticipated Discharge Services: None  Anticipated Discharge DME: None    Patient/family educated on Medicare website which has current facility and service quality ratings: yes  Education Provided on the Discharge Plan:    Patient/Family in Agreement with the Plan: yes    Referrals Placed by CM/SW: Post Acute Facilities  Private pay costs discussed: Not applicable    Additional Information:  Informed by bedside RN that pt/family requesting an update. Spoke with pt and daughter Farheen at bedside. Informed of acceptance to [facility] at [facility]CU. Farheen wondered about private pay home care instead of TCU. Provided with agency list. Updated that ESPERNAZA heard from [facility] that pt not medically ready today, so could plan for tomorrow. Farheen confirmed that she will likely help with transport when the time comes.     Text to [facility] liaison, to update that pt likely ready tomorrow to accept bed.     Kayla Briggs, AUSTYN  Social Work  [facility]

## 2023-07-17 NOTE — PROVIDER NOTIFICATION
MD Notification    Notified Person: MD    Notified Person Name:Dr. Hamilton  Notification Date/Time:7/16/23, 2255    Notification Interaction:Amcom    Purpose of Notification:Pt reports increased burning sensation with/without voiding. UA in process. Pyridium started yesterday,  discontinue d/t kidney problems. Can something be ordered?    Orders Received: Rocephin and Vanco ordered    Comments:

## 2023-07-17 NOTE — PROGRESS NOTES
[facility]    Medicine Progress Note - Hospitalist Service    Date of Admission:  7/14/2023    Assessment & Plan   Leesa Jacinto is a very pleasant 83 year old female with past medical history of osteoporosis, proximal atrial fibrillation, on chronic anticoagulation, peripheral neuropathy, hypertension, hypothyroidism, anxiety, cardiomyopathy, hyperlipidemia and history of CVA.  She was admitted on 7/14/2023 after a fall and acute back pain.     Fall at home landing on her back  Sustaining moderate burst compression fracture of T3 with mild retropulsion of bony fragments into the spinal canal  Upper back/between her shoulder blades pain  -CT scan with Compression deformity at T3, T12 and L2 with age indeterminant  -Incidental finding of few pulmonary nodule  -Discussed with patient about the finding.  -Recommend for patient to follow-up with her primary doctor for a recheck of CT scan needs.  -Currently patient remained asymptomatic for respiratory issue.  -Pain control, receiving Tylenol 650 mg, oxycodone 5 mg as needed, and received breakthrough dose of Dilaudid 0.3 mg IV overnight given ongoing pain.  -Added Flexeril for spasm pain.  -Continue to use back brace as ordered.  -Education provided regarding back brace to be used as ordered for the treatment of compression deformity at T3.  -Lifting restriction between 5 and 10 pounds per neurosurgery recommendation.  -Per neurosurgery team, okay to resume PTA Eliquis   -Physical therapy progress note recommend TCU.  Discussed with SW/CC.     Acute cystitis with gross hematuria  History of recurrent UTI  History of C. difficile colitis  Managed by primary doctor.  Prefers to avoid antibiotics, due to risk of colitis.  Regularly uses over-the-counter medication. [facility]  Also increased pain with any urination, increased  frequency/urgency.  -Probiotic started  -UA abnormal, positive nitrates, small blood, large LE, pyuria, few bacteria, WBC clumps present.  --Outside facility records reviewed, patient has history of Klebsiella and E. coli, which appears to be sensitive to current antibiotics.  --Await urine culture results to return  --Continue with IV ceftriaxone initiated 7/16.  --Given history of C. difficile, will continue vancomycin 125 mg p.o. twice daily  -- Pyridium as tolerated, aware of pharmacy sticky note stating this is contraindicated given her creatinine clearance.  We will continue to monitor.    Acute kidney injury  Creatinine 0.8-0.9 at baseline.  Up to 1.03 on admission.  --Possibly contributed to by her acute UTI and dehydration.  --Monitor renal function, avoid nephrotoxins as able.    Elevated transaminases  Patient denies any abdominal pain, abdominal exam WNL.  Bilirubin WNL, alkaline phosphatase elevated at 134.  No overt evidence of cholecystitis or other acute biliary disease.  --We will repeat LFTs in AM.    --Check right upper quadrant ultrasounds   --Pending results of above, will pursue further work-up.     History of CVA  On chronic Eliquis to  Paroxysmal atrial fibrillation  Takotsubo cardiomyopathy in 2015. resolved   -11/2019 MRI/MRA multiple small areas of restricted diffusion in the left superior frontal gyrus and in the left frontal lobe periventricular white matter anterior to left frontal horn as well as multisegmental severe narrowing of the bilateral A2 anterior cerebral artery segments and moderate narrowing of the right M2 MCA inferior division (admit to [facility] for CVA) stroke thought secondary to intracranial atherosclerosis with left PRISCA cerebral infarction. With bilateral PRISCA stenosis.  Initially on ASA and plavix   - 8/21/2022 presentation with left arm and leg weakness and limb shaking of LLE   Imaging negative for acute stroke including CT/MRI :  Diagnosed with limb shaking TIA ;  started on pletal   -Started on eliquis 6/2022 after cardiology visit with PAF and strokes.   -Eliquis, per neurosurgery recommendation.  -No hematoma or vital signs abnormality noted.    Pulmonary nodules  7/14 CT chest with bilateral subpleural scarring or atelectasis. RUL 2 mm nodule. RML 3 mm and 3 mm second nodule in RML other smaller nodules.   --WILL REQUIRE FOLLOW UP with PCP     Hypothyroidism  Idiopathic peripheral neuropathy  History of osteoporosis       Diet: Combination Diet Regular Diet Adult    DVT Prophylaxis: On Eliquis at home medication.  Limon Catheter: Not present  Lines: None     Cardiac Monitoring: None  Code Status: No CPR- Do NOT Intubate      Clinically Significant Risk Factors Present on Admission        # Hypokalemia: Lowest K = 3.2 mmol/L in last 2 days, will replace as needed        # Drug Induced Coagulation Defect: home medication list includes an anticoagulant medication  # Drug Induced Platelet Defect: home medication list includes an antiplatelet medication                 Disposition Plan      Expected Discharge Date: 07/19/2023      Destination: other (comment) (TCU)  Discharge Comments: CC/SW/PT  TLSO brace    TCU has [employer]  [facility]ed at [facility].  Waiting on UC.        The patient's care was discussed with the patient, patient's daughter, bedside nurse, charge nurse, , and care coordinator.    This patient was discussed with Dr. Argueta of the hospitalist service.  He is in agreement my assessment and plan of care.    AILEEN Camejo  Hospitalist Service  [facility]  Securely message with [employer] (more info)  Text page via [facility] Paging/Directory   ______________________________________________________________________    Interval History   Patient in bed on my arrival, writhing in pain.  States every time she has to urinate it is severely painful.  Reports increased urinary urgency and frequency.  She has pure  wick in place.  Urine is still very dark.  Has not received Pyridium today.  Daughter present at bedside.  Patient denies any fever, chills, chest pain, shortness of breath.  She has back pain in the thoracic spine as expected with compression fracture.  Overall pain is not well controlled.  She also has multiple issues including elevated transaminases in the absence of abdominal pain, elevated creatinine from baseline, and given concern for ongoing need for antibiotics she is prone to developing C. difficile in the past.    Physical Exam   Vital Signs: Temp: 97.4  F (36.3  C) Temp src: Oral BP: 111/58 Pulse: 86   Resp: 16 SpO2: 95 % O2 Device: None (Room air)    Weight: 100 lbs 12 oz    Constitutional: awake, alert, cooperative, in NAD but appears uncomfortable.  Eyes: Conjunctive and sclera clear.  Respiratory: No increased work of breathing, clear to auscultation bilaterally, no crackles or wheezing  Cardiovascular: S1-S2 intact.  No pitting edema noted lower extremity.  GI: Bowel sound positive nontender nondistended.  Genitounirinary: Bladder:   Suprapubic tenderness present.  Dark orange/reddish urine in canister.  Skin: No acute rash noted on exposed skin.  Warm and dry.  Musculoskeletal: no lower extremity pitting edema present  Neurologic: No confusion appreciated at this visit.    Medical Decision Making       45 MINUTES SPENT BY ME on the date of service doing chart review, history, exam, documentation & further activities per the note.      Data     I have personally reviewed the following data over the past 24 hrs:    6.0  \   11.6 (L)   / 201     141 109 (H) 26.9 (H) /  134 (H)   3.2 (L) 20 (L) 0.88 \       ALT: 67 (H) AST: 50 (H) AP: 134 (H) TBILI: 0.5   ALB: 3.7 TOT PROTEIN: 6.1 (L) LIPASE: N/A       Imaging results reviewed over the past 24 hrs:   No results found for this or any previous visit (from the past 24 hour(s)).  Recent Labs   Lab 07/17/23  1243 07/15/23  0724 07/14/23  1051   WBC 6.0 6.9  10.7   HGB 11.6* 11.1* 11.9   MCV 94 96 91    169 215    139 141   POTASSIUM 3.2* 3.8 3.5   CHLORIDE 109* 107 108*   CO2 20* 22 18*   BUN 26.9* 24.5* 20.2   CR 0.88 1.03* 0.85   ANIONGAP 12 10 15   DEBI 8.5* 8.5* 8.8   * 95 98   ALBUMIN 3.7 3.5 3.9   PROTTOTAL 6.1* 5.6* 6.0*   BILITOTAL 0.5 0.9 0.6   ALKPHOS 134* 92 71   ALT 67* 81* 29   AST 50* 106* 29

## 2023-07-17 NOTE — PLAN OF CARE
Summary: Fall, compression fracture of T3  Date & Time: 0700-1900 7/17  Orientation: A&Ox4  Activity Level: Assist of 1 GB/W, x1 up in hallways  Fall Risk: Yes  Behavior & Aggression: Green  Pain Management: Decreased with oxy, and heat   ABNL VS/O2: VSS on RA ex BP soft   Diet: Regular, NPO for ultrasound  Bowel/Bladder: purewick  Drains/Devices: PIV infusing   Tests/Procedures: US Abd LUQ   Anticipated  DC Date: Pending   Other Important Info: brace when OOB, gave flexeril for bladder spasms

## 2023-07-17 NOTE — SIGNIFICANT EVENT
Significant Event Note    Time of event: 11:08 PM July 16, 2023    Description of event:  Patient continues to complain of burning sensation with voiding and increased pain.  UA from today - was positive for UTI.  Patient would like C diff prophylaxis with the antibiotics.    Plan:  -Start Ceftriaxone 1 gm q24 hours.  -Start Vancomycin 125 mg daily for c diff prophylaxis.     Discussed with: bedside nurse    Raphael Hamilton MD

## 2023-07-17 NOTE — PROGRESS NOTES
S: Order received to fit patient with a OTS TLSO as ordered by Zunilda Boyd T-3 fracture  O/G: Support and stabilize the thoracic, lumbar, and sacral spine.  A: Patient was seen in ER just after coming back from an MRI. I have provided an Ubly 456 TLSO. Waist was adjusted to correct size along with the back panel. Patient was able to don and doff after giving her instruction.  P: Patient will contact orthotics if any issues arise.    Damian Kingsley LP, [place], [place]

## 2023-07-17 NOTE — PLAN OF CARE
Goal Outcome Evaluation:    Date/Time:7/16/23 [id number]     Trauma/Ortho/Medical (Choose one) Medical     Diagnosis:Fall, closed fracture of 3rd thoracic vetebrate  POD#:N/A  Mental Status:A&Ox 4  Activity/dangle : A1 GBW/ braces  Diet:Regular  Pain:Tylenol   Limon/Voiding:Purewick in place  Tele/Restraints/Iso:N/A  02/LDA:VSS on RA  D/C Date:pending placement  Other Info:Takes pills whole.

## 2023-07-17 NOTE — PROGRESS NOTES
[facility]  Neurosurgery Daily Progress Note    Assessment & Plan   Chart reviewed. Orthotics fitted TLSO today.     - Recommend to wear brace when out of bed  - Avoid heavy lifting, bending, twisting  - Continue pain control measures as needed  - Follow up with NSG clinic in 6 weeks with xray prior   - Appreciate assistance from specialties   - NSG will sign off, please call with questions or concerns    Discussed with Dr. Rg Meyer, CNP  [facility] Neurosurgery  [address] [address]  Tel [phone]  Pager [phone]

## 2023-07-18 ENCOUNTER — APPOINTMENT (OUTPATIENT)
Dept: PHYSICAL THERAPY | Facility: CLINIC | Age: 83
DRG: 551 | End: 2023-07-18
Payer: COMMERCIAL

## 2023-07-18 LAB
ANION GAP SERPL CALCULATED.3IONS-SCNC: 9 MMOL/L (ref 7–15)
BASOPHILS # BLD AUTO: 0.1 10E3/UL (ref 0–0.2)
BASOPHILS NFR BLD AUTO: 1 %
BUN SERPL-MCNC: 19.9 MG/DL (ref 8–23)
CALCIUM SERPL-MCNC: 8.1 MG/DL (ref 8.8–10.2)
CHLORIDE SERPL-SCNC: 112 MMOL/L (ref 98–107)
CREAT SERPL-MCNC: 0.79 MG/DL (ref 0.51–0.95)
DEPRECATED HCO3 PLAS-SCNC: 21 MMOL/L (ref 22–29)
EOSINOPHIL # BLD AUTO: 0.4 10E3/UL (ref 0–0.7)
EOSINOPHIL NFR BLD AUTO: 10 %
ERYTHROCYTE [DISTWIDTH] IN BLOOD BY AUTOMATED COUNT: 14.3 % (ref 10–15)
GFR SERPL CREATININE-BSD FRML MDRD: 73 ML/MIN/1.73M2
GLUCOSE SERPL-MCNC: 92 MG/DL (ref 70–99)
HCT VFR BLD AUTO: 31.8 % (ref 35–47)
HGB BLD-MCNC: 10.5 G/DL (ref 11.7–15.7)
IMM GRANULOCYTES # BLD: 0 10E3/UL
IMM GRANULOCYTES NFR BLD: 0 %
LYMPHOCYTES # BLD AUTO: 0.9 10E3/UL (ref 0.8–5.3)
LYMPHOCYTES NFR BLD AUTO: 20 %
MCH RBC QN AUTO: 31.2 PG (ref 26.5–33)
MCHC RBC AUTO-ENTMCNC: 33 G/DL (ref 31.5–36.5)
MCV RBC AUTO: 94 FL (ref 78–100)
MONOCYTES # BLD AUTO: 0.5 10E3/UL (ref 0–1.3)
MONOCYTES NFR BLD AUTO: 12 %
NEUTROPHILS # BLD AUTO: 2.6 10E3/UL (ref 1.6–8.3)
NEUTROPHILS NFR BLD AUTO: 57 %
NRBC # BLD AUTO: 0 10E3/UL
NRBC BLD AUTO-RTO: 0 /100
PLATELET # BLD AUTO: 189 10E3/UL (ref 150–450)
POTASSIUM SERPL-SCNC: 3.5 MMOL/L (ref 3.4–5.3)
RBC # BLD AUTO: 3.37 10E6/UL (ref 3.8–5.2)
SODIUM SERPL-SCNC: 142 MMOL/L (ref 136–145)
WBC # BLD AUTO: 4.5 10E3/UL (ref 4–11)

## 2023-07-18 PROCEDURE — 250N000011 HC RX IP 250 OP 636: Mod: JZ | Performed by: INTERNAL MEDICINE

## 2023-07-18 PROCEDURE — 250N000013 HC RX MED GY IP 250 OP 250 PS 637: Performed by: NURSE PRACTITIONER

## 2023-07-18 PROCEDURE — 250N000013 HC RX MED GY IP 250 OP 250 PS 637: Performed by: PHYSICIAN ASSISTANT

## 2023-07-18 PROCEDURE — 83735 ASSAY OF MAGNESIUM: CPT | Performed by: HOSPITALIST

## 2023-07-18 PROCEDURE — 97116 GAIT TRAINING THERAPY: CPT | Mod: GP

## 2023-07-18 PROCEDURE — 36415 COLL VENOUS BLD VENIPUNCTURE: CPT | Performed by: INTERNAL MEDICINE

## 2023-07-18 PROCEDURE — 120N000001 HC R&B MED SURG/OB

## 2023-07-18 PROCEDURE — 99232 SBSQ HOSP IP/OBS MODERATE 35: CPT | Performed by: INTERNAL MEDICINE

## 2023-07-18 PROCEDURE — 80048 BASIC METABOLIC PNL TOTAL CA: CPT | Performed by: INTERNAL MEDICINE

## 2023-07-18 PROCEDURE — 250N000013 HC RX MED GY IP 250 OP 250 PS 637: Performed by: INTERNAL MEDICINE

## 2023-07-18 PROCEDURE — 97530 THERAPEUTIC ACTIVITIES: CPT | Mod: GP

## 2023-07-18 PROCEDURE — 85025 COMPLETE CBC W/AUTO DIFF WBC: CPT | Performed by: INTERNAL MEDICINE

## 2023-07-18 RX ORDER — AMITRIPTYLINE HYDROCHLORIDE 10 MG/1
10 TABLET ORAL AT BEDTIME
Status: DISCONTINUED | OUTPATIENT
Start: 2023-07-19 | End: 2023-07-18

## 2023-07-18 RX ORDER — AMITRIPTYLINE HYDROCHLORIDE 10 MG/1
10 TABLET ORAL ONCE
Status: DISCONTINUED | OUTPATIENT
Start: 2023-07-18 | End: 2023-07-18

## 2023-07-18 RX ORDER — OXYBUTYNIN CHLORIDE 5 MG/1
5 TABLET ORAL 3 TIMES DAILY
Status: DISCONTINUED | OUTPATIENT
Start: 2023-07-18 | End: 2023-07-22

## 2023-07-18 RX ORDER — CEFTRIAXONE 1 G/1
1 INJECTION, POWDER, FOR SOLUTION INTRAMUSCULAR; INTRAVENOUS EVERY 24 HOURS
Status: COMPLETED | OUTPATIENT
Start: 2023-07-18 | End: 2023-07-20

## 2023-07-18 RX ADMIN — SENNOSIDES 8.6 MG: 8.6 TABLET, FILM COATED ORAL at 20:00

## 2023-07-18 RX ADMIN — LEVOTHYROXINE SODIUM 50 MCG: 50 TABLET ORAL at 05:27

## 2023-07-18 RX ADMIN — FLUOXETINE 40 MG: 20 CAPSULE ORAL at 20:00

## 2023-07-18 RX ADMIN — MULTIPLE VITAMINS W/ MINERALS TAB 1 TABLET: TAB at 09:15

## 2023-07-18 RX ADMIN — OXYBUTYNIN CHLORIDE 5 MG: 5 TABLET ORAL at 13:01

## 2023-07-18 RX ADMIN — Medication 1 CAPSULE: at 09:24

## 2023-07-18 RX ADMIN — Medication 50 MCG: at 20:00

## 2023-07-18 RX ADMIN — OXYCODONE HYDROCHLORIDE 5 MG: 5 TABLET ORAL at 17:12

## 2023-07-18 RX ADMIN — LIDOCAINE 1 PATCH: 560 PATCH PERCUTANEOUS; TOPICAL; TRANSDERMAL at 20:00

## 2023-07-18 RX ADMIN — GABAPENTIN 300 MG: 300 CAPSULE ORAL at 22:06

## 2023-07-18 RX ADMIN — APIXABAN 2.5 MG: 2.5 TABLET, FILM COATED ORAL at 09:15

## 2023-07-18 RX ADMIN — APIXABAN 2.5 MG: 2.5 TABLET, FILM COATED ORAL at 19:59

## 2023-07-18 RX ADMIN — CILOSTAZOL 100 MG: 100 TABLET ORAL at 09:15

## 2023-07-18 RX ADMIN — SENNOSIDES AND DOCUSATE SODIUM 1 TABLET: 50; 8.6 TABLET ORAL at 13:01

## 2023-07-18 RX ADMIN — CILOSTAZOL 100 MG: 100 TABLET ORAL at 20:00

## 2023-07-18 RX ADMIN — VANCOMYCIN HYDROCHLORIDE 125 MG: 125 CAPSULE ORAL at 09:15

## 2023-07-18 RX ADMIN — Medication 1 CAPSULE: at 22:06

## 2023-07-18 RX ADMIN — OXYBUTYNIN CHLORIDE 5 MG: 5 TABLET ORAL at 20:00

## 2023-07-18 RX ADMIN — ATORVASTATIN CALCIUM 20 MG: 20 TABLET, FILM COATED ORAL at 20:00

## 2023-07-18 RX ADMIN — ACETAMINOPHEN 650 MG: 325 TABLET, FILM COATED ORAL at 17:07

## 2023-07-18 RX ADMIN — ACETAMINOPHEN 650 MG: 325 TABLET, FILM COATED ORAL at 13:01

## 2023-07-18 RX ADMIN — VANCOMYCIN HYDROCHLORIDE 125 MG: 125 CAPSULE ORAL at 19:59

## 2023-07-18 RX ADMIN — CEFTRIAXONE SODIUM 1 G: 1 INJECTION, POWDER, FOR SOLUTION INTRAMUSCULAR; INTRAVENOUS at 22:34

## 2023-07-18 RX ADMIN — ACETAMINOPHEN 650 MG: 325 TABLET, FILM COATED ORAL at 00:15

## 2023-07-18 ASSESSMENT — ACTIVITIES OF DAILY LIVING (ADL)
DOING_ERRANDS_INDEPENDENTLY_DIFFICULTY: YES
ADLS_ACUITY_SCORE: 36
TOILETING_ISSUES: NO
DIFFICULTY_EATING/SWALLOWING: NO
VISION_MANAGEMENT: GLASSES
ADLS_ACUITY_SCORE: 41
WALKING_OR_CLIMBING_STAIRS_DIFFICULTY: YES
WEAR_GLASSES_OR_BLIND: YES
ADLS_ACUITY_SCORE: 36
DRESSING/BATHING_DIFFICULTY: NO
CHANGE_IN_FUNCTIONAL_STATUS_SINCE_ONSET_OF_CURRENT_ILLNESS/INJURY: YES
ADLS_ACUITY_SCORE: 40
CONCENTRATING,_REMEMBERING_OR_MAKING_DECISIONS_DIFFICULTY: NO
ADLS_ACUITY_SCORE: 36
TRANSFERRING: 1-->ASSISTANCE (EQUIPMENT/PERSON) NEEDED (NOT DEVELOPMENTALLY APPROPRIATE)
ADLS_ACUITY_SCORE: 45
TRANSFERRING: 1-->ASSISTANCE (EQUIPMENT/PERSON) NEEDED
ADLS_ACUITY_SCORE: 41
ADLS_ACUITY_SCORE: 40
ADLS_ACUITY_SCORE: 36
WALKING_OR_CLIMBING_STAIRS: AMBULATION DIFFICULTY, REQUIRES EQUIPMENT;STAIR CLIMBING DIFFICULTY, REQUIRES EQUIPMENT;TRANSFERRING DIFFICULTY, REQUIRES EQUIPMENT

## 2023-07-18 NOTE — PLAN OF CARE
Summary:  Fall at home landing on her back  Sustaining moderate burst compression fracture of T3 with mild retropulsion of bony fragments into the spinal canal  Orientation: A&Ox4, forgetful  Observation Goals (met & not met):   Activity Level: A1 gb/w  Fall Risk: Yes  Behavior & Aggression Tool Color: Green  Pain Management: Scheduled tylenol, PRN dilaudid, oxycodone  ABNL VS/O2:  ABNL Lab/BG: K 3.2  Diet: Regular  Bowel/Bladder: Cont, PW in place  Drains/Devices: L PIV  Tests/Procedures for next shift:   Anticipated DC date: 7/18, TCU vs. home  Other Important Info:   *TLSO brace when OOB

## 2023-07-18 NOTE — PLAN OF CARE
Date/Time: 7/17 night shift [id number]  Summary: ad 7/14 with fall abd back pain,  Closed fracture of third thoracic vertebra history of osteoporosis, PAF on eliquis, peripheral neuropathy, hypertension, hypothyroidism, anxiety, osteoporosis, stress-induced cardiomyopathy, CVA hyperlipidemia  Mental Status: A/Ox4  Activity/dangle:A1/GB/walker; with back brace when OOB  Diet: regular  Pain: managed with scheduled Tylenol   Limon/Voiding: Purewick, urine has orange color from AZO  Skin:  intact  02/LDA: VSS on RA, PIV SL  D/C Date: TBD  Other Info: CMS  and Neuro intact intact.pills whole.baseline LE neuropathy

## 2023-07-18 NOTE — PROGRESS NOTES
Care Management Follow Up    Length of Stay (days): 1    Expected Discharge Date: 07/18/2023     Concerns to be Addressed: discharge planning     Patient plan of care discussed at interdisciplinary rounds: Yes    Anticipated Discharge Disposition: Transitional Care     Anticipated Discharge Services: None  Anticipated Discharge DME: None    Patient/family educated on Medicare website which has current facility and service quality ratings: yes  Education Provided on the Discharge Plan:    Patient/Family in Agreement with the Plan: yes    Referrals Placed by CM/SW: Post Acute Facilities  Private pay costs discussed: Not applicable    Additional Information:  Informed by [facility] liaison, that TCU can accept today at [facility]. Spoke with pt and son Russell at bedside. Pt states they are still unsure if they'd like to go home with help/services versus TCU. Pt and son state they do not feel she's medically ready today. Paged MD to update and spoke with bedside RN as well.    Per MD, family and pt still deciding between TCU versus home with private pay 24 hour cares. Will follow for discharge planning tomorrow. Updated Deb at [facility].    AUSTYN Pena  Social Work  [facility]

## 2023-07-18 NOTE — PLAN OF CARE
Goal Outcome Evaluation:    Summary:  Closed fracture of t3; UTI  Mental Status: A/Ox4  Mobility level: A1/GB/walker; w back brace on.   Diet: Regular  Behavior concerns: None  Pain: Pain with urination. Scheduled Tylenol and Lidocaine patch; started on ditropan for bladder spasms  Bladder/Bowel: Incontinent of bladder, urine pramod colored, purewick; occasionally incontinent of bowel, 2 hard stools; PRN stool softener given  Skin:  Intact  Tele/VS/O2: VSS on RA, some SOB with exertion  Drains/Devices: PIV SL; new placement on right forearm  Upcoming consults: PT  D/C Date: pending TCU placement or in-home care

## 2023-07-18 NOTE — PROGRESS NOTES
[facility]    Internal Medicine Hospitalist Progress Note  07/18/2023  I evaluated patient on the above date.    J Luis Argueta Jr., MD  [phone] (p)  Text Page  Vocera        Assessment & Plan New actions/orders today (07/18/2023) are underlined. All lab results in the assessment and plan were reviewed.    Leesa Jacinto is a very pleasant 83 year old female with past medical history including PAF on chronic anticoagulation; HLD; CVA history; hypothyroidism; depression/anxiety; peripheral neuropathy; osteoporosis; h/o Takotsubo cardiomyopathy; and h/o C. Diff; who presented 7/14/2023 with acute back pain after a fall.'    On initially evaluation 7/14, VSS. ECG with SR with NSTWA, no acute ischemic changes. Labs notable for BMP with bicarb 18; CBC unremarkable; LFT's normal. Head CT 7/14 negative for acute findings. '    CT T-spine 7/14 showed a recent-appearing moderate burst compression fracture deformity of the T3 vertebral body with mild retropulsion of bony fragments into the spinal canal; chronic-appearing  moderate compression fracture deformity of the T12 vertebral body; no spinal canal stenosis.    CT L-spine 7/14 showed no acute fractures; multiple areas of degenerative/chronic changes.    CT C-spine 7/14 showed no acute fractures; degenerative changes.    CT CAP 7/14 showed age indeterminate compression deformities at T3, T12, and L2; no other acute traumatic abnormality identified; a few indeterminate pulmonary nodules; mild biliary ductal prominence.    MRI T-spine 7/14 showed recent-appearing moderate burst compression fracture deformity of the T3 vertebral body again noted; chronic-appearing fracture of the T12 vertebral body again noted; bone marrow edema in the T3 vertebral body consistent with recent fracture; bone marrow edema in the T2 vertebral body possibly due to altered biomechanics at T2-T3, marrow signal otherwise normal, no other evidence for fracture  or pathologic bony lesion; mixed signal intensity nodule in the T8 vertebral body that could represent an atypical hemangioma.      Traumatic (fall) moderate burst compression fracture of T3 with mild retropulsion of bony fragments into the spinal canal.  * Initial presentation as above. Neurosurgery consulted on admit.  * Started on multiple pain meds including lidocaine patch, PRN oxycodone, PRN cyclobenzaprine.   * Neurosurgery recommended TLSO.   * Therapies recommended TCU.  - Continue TLSO when out of bed.  - Continue lifting restriction between 5 and 10 pounds.  - Continue lidocaine patch 4% q24h; PRN acetaminophen 650 mg q6h; PRN cyclobenzaprine 10 mg q8h; PRN oxycodone 2.5-5 mg q6h; PRN IV hydromorphone 0.2-0.3 mg q4h; minimize opioids as able.  - Continue PT, OT; discussed with pt and son and daugther 7/18, still considering home with 24h cares (family would secure) vs TCU.  - Follow-up with Neurosurgery in 6 weeks with x-ray prior.    E. Coli UTI.  Gross hematuria, suspect related to above with concomitant anticoagulation.  Severe bladder pain spasms/dysuria, suspect chronic component and worsened by UTI.  History of recurrent UTI.  * Managed by primary doctor. Generally prefers to avoid antibiotics, due to risk of colitis. Regularly uses over-the-counter AZO (phenazopyridine).   * On 7/16, noted with gross hematuria, dysuria. UA grossly abnormal. Started on ceftriaxone. UC growing E. Coli.   * On 7/17, pt c/o severe episodes of bladder pain/dysuria. She had been taking phenazopyridine OTC with good effect for ~1 year, usually about 1x/week. Pt and daughter were upset that this had been stopped. Discussed with them that phenazopyridine was not recommended with pt's renal function (normal cr, but crcl in 20's-30's). Discussed risks with using phenazopyridine with decreased cr clearance and potential side effects; given no adverse effects with once a month usage for the last year, pt seemed to have  tolerated; as such, ordered phenazopyridine 100 mg x1 with pt and family aware of the potential risks.  * On 7/18, pt/family still concerned about episodes of severe dysuria/bladder pain.  - Order oxybutynin 5 mg TID.  - Continue ceftriaxone (started 7/16) - stop after 7/20.  - Continue to monitor hematuria.    History of C. difficile colitis.  * Started on PO vanco prophylaxis 7/17.  - Continue PO vanco prophylaxis BID - stop after 7/27.    Acute kidney injury, suspect prerenal.  * Creatinine 0.8-0.9 at baseline.   * Cr 1.03 on 7/15.   * Pt given IVF's 7/17.  Recent Labs   Lab 07/18/23  0647 07/17/23  1243 07/15/23  0724 07/14/23  1051   CR 0.79 0.88 1.03* 0.85   - Continue to monitor BMP - repeat in am.  - Avoid nephrotoxic medications.    Elevated transaminases, unclear etiology.  * LFT's normal on admit. CT CAP 7/14 showed no acute hepatic abnormality, gallbladder not seen, mild intrahepatic biliary ductal prominence.   * Abdominal US 7/17 negative.  Recent Labs   Lab 07/18/23  0647 07/17/23  1243 07/16/23  1542 07/15/23  0724 07/14/23  1051   ALBUMIN  --  3.7  --  3.5 3.9   BILITOTAL  --  0.5  --  0.9 0.6   ALT  --  67*  --  81* 29   AST  --  50*  --  106* 29   ALKPHOS  --  134*  --  92 71   PROTEIN  --   --  30*  --   --    CR 0.79 0.88  --  1.03* 0.85   - Continue to monitor LFT's.    Indeterminate pulmonary nodules.  * Initial presentation and imaging as above. CT CAP 7/14 also showed a few indeterminate pulmonary nodules.  - Follow-up with PCP for follow-up imaging if indicated.    PAF on chronic AC.  CVA (2019) history felt to be related to intracranial atherosclerosis.  Cerebrovascular disease.  * H/o stroke in 2019 thought secondary to intracranial atherosclerosis with left PRISCA cerebral infarction. In 8/2022, admitted with left arm and leg weakness and limb shaking of LLE; imaging negative for acute stroke. Head CTA 8/2022 showed bilateral M2 segments demonstrated multiple stenoses ranging from mild to  severe; bilateral A2/A3 segments with history multiple stenoses ranging from mild to severe; right supraclinoid ICA demonstrated a small 2 mm inferior outpouching may reflect infundibulum with poorly visualized apical artery or small aneurysm. Started on cilostazol 8/2023.  - Continue apixaban, cilostazol, atorvastatin.    Depression/anxiety.  - Continue fluoxetine.    Peripheral neuropathy.  - Continue gabapentin.    Takotsubo cardiomyopathy in 2015, resolved.   - Noted.      Clinically Significant Risk Factors Present on Admission        # Hypokalemia: Lowest K = 3.2 mmol/L in last 2 days, will replace as needed        # Drug Induced Coagulation Defect: home medication list includes an anticoagulant medication  # Drug Induced Platelet Defect: home medication list includes an antiplatelet medication                   COVID-19 testing.  COVID-19 PCR Results        10/7/2021    17:39 8/23/2022    12:12   COVID-19 PCR Results   COVID-19 Virus by PCR (External Result) Negative        SARS CoV2 PCR  Negative     Details          This result is from an external source.         COVID-19 Antibody Results, Testing for Immunity         No data to display                Diet: Combination Diet Regular Diet Adult    Prophylaxis: PCD's, ambulation. On apixaban.  Limon Catheter: Not present  Lines: None     Code Status: No CPR- Do NOT Intubate    Disposition Plan   Expected discharge: 1-2d recommended to home with 24h supervision vs TCU pending improvement in bladder pain/spasms, family arrangement of 24h cares vs TCU bed availability if pt decides.  Entered: J Luis Argueta MD 07/18/2023, 7:13 AM     Communication.  - I d/w pt's son and daughter 7/18.      Interval History   Still having severe episodes of bladder pain/spasms with urination.  Back pain controlled in bed; worse with activity, but overall better, TLSO helps.    -Data reviewed today: I reviewed all new labs and imaging over the last 24 hours. I personally  "reviewed no images or EKG's today.    Physical Exam    , Blood pressure 120/54, pulse 75, temperature 97.6  F (36.4  C), temperature source Oral, resp. rate 16, height 1.499 m (4' 11\"), weight 45.7 kg (100 lb 12 oz), SpO2 96 %. O2 Device: None (Room air)    Vitals:    07/14/23 1700   Weight: 45.7 kg (100 lb 12 oz)     Vital Signs with Ranges  Temp:  [97.4  F (36.3  C)-97.6  F (36.4  C)] 97.6  F (36.4  C)  Pulse:  [74-86] 75  Resp:  [16] 16  BP: (108-120)/(54-58) 120/54  SpO2:  [93 %-96 %] 96 %  Patient Vitals for the past 24 hrs:   BP Temp Temp src Pulse Resp SpO2   07/17/23 2300 120/54 97.6  F (36.4  C) Oral 75 16 96 %   07/17/23 1530 108/58 97.5  F (36.4  C) Oral 74 16 93 %   07/17/23 0833 111/58 97.4  F (36.3  C) Oral 86 16 95 %     I/O's Last 24 hours  I/O last 3 completed shifts:  In: 120 [P.O.:120]  Out: 650 [Urine:650]    Constitutional: Awake, alert, oriented, pleasant.  Respiratory: Diminished in bases. No crackles or wheezes.  Cardiovascular: RRR, no m/r/g.  GI: Soft, nt, nd, +BS.  Skin/Integumen:   Other:        Data    Labs reviewed.  Recent Labs   Lab 07/17/23  1243 07/15/23  0724 07/14/23  1051   WBC 6.0 6.9 10.7   HGB 11.6* 11.1* 11.9   MCV 94 96 91    169 215    139 141   POTASSIUM 3.2* 3.8 3.5   CHLORIDE 109* 107 108*   CO2 20* 22 18*   BUN 26.9* 24.5* 20.2   CR 0.88 1.03* 0.85   ANIONGAP 12 10 15   DEBI 8.5* 8.5* 8.8   * 95 98   ALBUMIN 3.7 3.5 3.9   PROTTOTAL 6.1* 5.6* 6.0*   BILITOTAL 0.5 0.9 0.6   ALKPHOS 134* 92 71   ALT 67* 81* 29   AST 50* 106* 29     No lab results found.  Recent Labs   Lab 07/17/23  1243 07/15/23  0724 07/14/23  1051   * 95 98      Recent Labs   Lab Test 08/21/22  1929   A1C 5.3        No results for input(s): INR, [id number] in the last 168 hours.  Recent Labs   Lab 07/17/23  1243 07/15/23  0724 07/14/23  1051   WBC 6.0 6.9 10.7       MICRO:  CULTURES (INCLUDING BLOOD AND URINE):  Recent Labs   Lab 07/16/23  1542   CULTURE >100,000 CFU/mL " Escherichia coli*       Recent Results (from the past 24 hour(s))   US Abdomen Limited    Narrative    EXAM: US ABDOMEN LIMITED  LOCATION: [facility]  DATE: 7/17/2023    INDICATION: elevated enzymes, pain  COMPARISON: None.  TECHNIQUE: Limited abdominal ultrasound.    FINDINGS:    GALLBLADDER: Surgically removed.    BILE DUCTS: No biliary dilatation. The common duct measures 6 mm.    LIVER: Normal parenchyma with smooth contour. No focal mass.    RIGHT KIDNEY: No hydronephrosis.    PANCREAS: The visualized portions are normal.    No ascites.      Impression    IMPRESSION:  1.  Cholecystectomy.  2.  Liver within normal limits.       Medications   All medications were reviewed.    Infusions:    Scheduled Medications:    acetaminophen  650 mg Oral Q6H     apixaban ANTICOAGULANT  2.5 mg Oral BID     atorvastatin  20 mg Oral Daily     cefTRIAXone  1 g Intravenous Q24H     cilostazol  100 mg Oral BID     FLUoxetine  40 mg Oral QPM     gabapentin  300 mg Oral At Bedtime     lactobacillus rhamnosus (GG)  1 capsule Oral BID     levothyroxine  50 mcg Oral QPM     lidocaine  1 patch Transdermal Q24h    And     lidocaine   Transdermal Q8H ALONZO     multivitamin w/minerals  1 tablet Oral Daily     polyethylene glycol  17 g Oral Daily     sennosides  8.6 mg Oral BID     sodium chloride (PF)  3 mL Intracatheter Q8H     vancomycin  125 mg Oral BID     vitamin D3  50 mcg Oral QPM     PRN Medications:  cyclobenzaprine, HYDROmorphone, HYDROmorphone, lidocaine 4%, lidocaine (buffered or not buffered), melatonin, naloxone **OR** naloxone **OR** naloxone **OR** naloxone, ondansetron, oxyCODONE IR, polyethylene glycol-propylene glycol PF, senna-docusate **OR** senna-docusate, sodium chloride (PF)

## 2023-07-19 ENCOUNTER — APPOINTMENT (OUTPATIENT)
Dept: PHYSICAL THERAPY | Facility: CLINIC | Age: 83
DRG: 551 | End: 2023-07-19
Payer: COMMERCIAL

## 2023-07-19 ENCOUNTER — APPOINTMENT (OUTPATIENT)
Dept: OCCUPATIONAL THERAPY | Facility: CLINIC | Age: 83
DRG: 551 | End: 2023-07-19
Attending: INTERNAL MEDICINE
Payer: COMMERCIAL

## 2023-07-19 PROCEDURE — 250N000013 HC RX MED GY IP 250 OP 250 PS 637: Performed by: PHYSICIAN ASSISTANT

## 2023-07-19 PROCEDURE — 120N000001 HC R&B MED SURG/OB

## 2023-07-19 PROCEDURE — 250N000013 HC RX MED GY IP 250 OP 250 PS 637: Performed by: NURSE PRACTITIONER

## 2023-07-19 PROCEDURE — 250N000013 HC RX MED GY IP 250 OP 250 PS 637: Performed by: INTERNAL MEDICINE

## 2023-07-19 PROCEDURE — 97530 THERAPEUTIC ACTIVITIES: CPT | Mod: GP

## 2023-07-19 PROCEDURE — 97535 SELF CARE MNGMENT TRAINING: CPT | Mod: GO

## 2023-07-19 PROCEDURE — 97165 OT EVAL LOW COMPLEX 30 MIN: CPT | Mod: GO

## 2023-07-19 PROCEDURE — 97116 GAIT TRAINING THERAPY: CPT | Mod: GP

## 2023-07-19 PROCEDURE — 250N000011 HC RX IP 250 OP 636: Mod: JZ | Performed by: NURSE PRACTITIONER

## 2023-07-19 PROCEDURE — 99232 SBSQ HOSP IP/OBS MODERATE 35: CPT | Performed by: HOSPITALIST

## 2023-07-19 RX ADMIN — OXYBUTYNIN CHLORIDE 5 MG: 5 TABLET ORAL at 20:40

## 2023-07-19 RX ADMIN — OXYCODONE HYDROCHLORIDE 2.5 MG: 5 TABLET ORAL at 15:01

## 2023-07-19 RX ADMIN — ACETAMINOPHEN 650 MG: 325 TABLET, FILM COATED ORAL at 17:10

## 2023-07-19 RX ADMIN — OXYBUTYNIN CHLORIDE 5 MG: 5 TABLET ORAL at 08:30

## 2023-07-19 RX ADMIN — ACETAMINOPHEN 650 MG: 325 TABLET, FILM COATED ORAL at 12:00

## 2023-07-19 RX ADMIN — LEVOTHYROXINE SODIUM 50 MCG: 50 TABLET ORAL at 06:39

## 2023-07-19 RX ADMIN — CYCLOBENZAPRINE 10 MG: 10 TABLET, FILM COATED ORAL at 12:00

## 2023-07-19 RX ADMIN — APIXABAN 2.5 MG: 2.5 TABLET, FILM COATED ORAL at 20:41

## 2023-07-19 RX ADMIN — VANCOMYCIN HYDROCHLORIDE 125 MG: 125 CAPSULE ORAL at 20:41

## 2023-07-19 RX ADMIN — OXYCODONE HYDROCHLORIDE 2.5 MG: 5 TABLET ORAL at 21:29

## 2023-07-19 RX ADMIN — Medication 1 CAPSULE: at 12:00

## 2023-07-19 RX ADMIN — Medication 50 MCG: at 20:38

## 2023-07-19 RX ADMIN — SENNOSIDES 8.6 MG: 8.6 TABLET, FILM COATED ORAL at 08:30

## 2023-07-19 RX ADMIN — ACETAMINOPHEN 650 MG: 325 TABLET, FILM COATED ORAL at 06:39

## 2023-07-19 RX ADMIN — VANCOMYCIN HYDROCHLORIDE 125 MG: 125 CAPSULE ORAL at 08:30

## 2023-07-19 RX ADMIN — Medication 1 CAPSULE: at 21:29

## 2023-07-19 RX ADMIN — ATORVASTATIN CALCIUM 20 MG: 20 TABLET, FILM COATED ORAL at 20:39

## 2023-07-19 RX ADMIN — APIXABAN 2.5 MG: 2.5 TABLET, FILM COATED ORAL at 08:30

## 2023-07-19 RX ADMIN — FLUOXETINE 40 MG: 20 CAPSULE ORAL at 20:38

## 2023-07-19 RX ADMIN — GABAPENTIN 300 MG: 300 CAPSULE ORAL at 21:29

## 2023-07-19 RX ADMIN — HYDROMORPHONE HYDROCHLORIDE 0.2 MG: 0.2 INJECTION, SOLUTION INTRAMUSCULAR; INTRAVENOUS; SUBCUTANEOUS at 02:25

## 2023-07-19 RX ADMIN — MULTIPLE VITAMINS W/ MINERALS TAB 1 TABLET: TAB at 08:30

## 2023-07-19 RX ADMIN — OXYCODONE HYDROCHLORIDE 5 MG: 5 TABLET ORAL at 08:43

## 2023-07-19 RX ADMIN — CILOSTAZOL 100 MG: 100 TABLET ORAL at 08:30

## 2023-07-19 RX ADMIN — CILOSTAZOL 100 MG: 100 TABLET ORAL at 20:38

## 2023-07-19 ASSESSMENT — ACTIVITIES OF DAILY LIVING (ADL)
ADLS_ACUITY_SCORE: 36
ADLS_ACUITY_SCORE: 36
ADLS_ACUITY_SCORE: 37
ADLS_ACUITY_SCORE: 36
ADLS_ACUITY_SCORE: 37
ADLS_ACUITY_SCORE: 36
ADLS_ACUITY_SCORE: 37
ADLS_ACUITY_SCORE: 36

## 2023-07-19 NOTE — PLAN OF CARE
Summary: Fall w/back injury. Compression fracture of T3. + UTI   Orientation: A&Ox4  Activity Level: Assist of 1 GB/W  Fall Risk: Yes  Behavior & Aggression: Green  Pain Management: PRN oxycodone given x1, lidocaine patch in place. Scheduled tylenol.  ABNL VS/O2: VSS on RA  Diet: Regular  Bowel/Bladder: Purewick, urine pramod colored  Drains/Devices: PIV SL  Tests/Procedures: Labs  Anticipated  DC Date: Pending   Other Important Info: back brace when OOB

## 2023-07-19 NOTE — PLAN OF CARE
"Orientation/Cognitive: A&Ox4  Observation Goals (Met/ Not Met): Inpatient status  Mobility Level/Assist Equipment: Ax1, gb, walker  Fall Risk (Y/N): Y  Behavior Concerns: none  Pain Management: Scheduled tylenol, PRN oxy, PRN flexeril, lidocaine patch overnight  Tele/VS/O2: vss  ABNL Lab/BG: WDL  Diet: tolerating a regular diet  Bowel/Bladder: Incontinent, purewick in place. Urine is pramod colored  Skin Concerns: None  Drains/Devices: PIV SL  Tests/Procedures for next shift: n/a  Anticipated DC date & active delays: 2-3 days  Patient Stated Goal for Today: pain control    /62 (BP Location: Left arm)   Pulse 80   Temp 97.9  F (36.6  C) (Oral)   Resp 16   Ht 1.499 m (4' 11\")   Wt 45.7 kg (100 lb 12 oz)   SpO2 97%   BMI 20.35 kg/m        Pt to wear TLSO brace when OOB      "

## 2023-07-19 NOTE — PLAN OF CARE
Goal Outcome Evaluation:      Plan of Care Reviewed With: patient    Overall Patient Progress: improving  Orientation: A&Ox4  Activity Level: Assist of 1 GB/W, x1 up in hallways  Fall Risk: Yes  Behavior & Aggression: Green  Pain Management: PRN oxycodone given x1, lidocaine patch in place. Scheduled tylenol.  ABNL VS/O2: VSS on RA ex BP soft   Diet: Regular  Bowel/Bladder: Pure wick, urine pramod colored  Drains/Devices: PIV SL  Tests/Procedures: Labs  Anticipated  DC Date: Pending   Other Important Info: back brace when OOB

## 2023-07-19 NOTE — PROGRESS NOTES
07/19/23 1500   Appointment Info   Signing Clinician's Name / Credentials (OT) Radha Chew, OTR/L   Living Environment   People in Home alone   Current Living Arrangements apartment   Home Accessibility no concerns   Transportation Anticipated family or friend will provide   Living Environment Comments Pt lives in IND living apt, has [facility] attached but she doesn't use any services. Walk in shower with built in chair and grab bars, grab bars by toilet, tall toilet.   Self-Care   Usual Activity Tolerance excellent   Current Activity Tolerance moderate   Equipment Currently Used at Home walker, rolling   Fall history within last six months yes   Number of times patient has fallen within last six months 1   Activity/Exercise/Self-Care Comment pt doesn't use walker at baseline, for long walks outside apt building she will use the wheeled walker.   Instrumental Activities of Daily Living (IADL)   Previous Responsibilities meal prep;housekeeping;laundry;medication management;finances;shopping;driving   IADL Comments Pt cooks - has access to dining room if needed; Grocery delivery; pt drives   General Information   Onset of Illness/Injury or Date of Surgery 07/14/23   Referring Physician J Luis Argueta MD   Patient/Family Therapy Goal Statement (OT) To go home   Additional Occupational Profile Info/Pertinent History of Current Problem Per chart: Leesa Jacinto is a very pleasant 83 year old female with past medical history of osteoporosis, proximal atrial fibrillation, on chronic anticoagulation, peripheral neuropathy, hypertension, hypothyroidism, anxiety, cardiomyopathy, hyperlipidemia and history of CVA.  She was admitted on 7/14/2023 after a fall and acute back pain.   Existing Precautions/Restrictions (S)  brace worn when out of bed;fall;spinal;other (see comments)  (Per Neurosurgery: Avoid heavy lifting, bending, twisting. Recommend to wear brace when out of bed.)   Cognitive Status Examination    Orientation Status orientation to person, place and time   Visual Perception   Visual Impairment/Limitations WFL;corrective lenses full-time   Sensory   Sensory Comments Pt does not report numbness/tingling in BUEs   Pain Assessment   Patient Currently in Pain No   Range of Motion Comprehensive   Comment, General Range of Motion BUEs WFL   Strength Comprehensive (MMT)   Comment, General Manual Muscle Testing (MMT) Assessment BUEs WFL   Coordination   Upper Extremity Coordination No deficits were identified   Coordination Comments B Hand arthritis noted, pt able to manage TLSO straps with increased time   Bed Mobility   Comment (Bed Mobility) Min A   Transfers   Transfers toilet transfer;sit-stand transfer   Sit-Stand Transfer   Sit/Stand Transfer Comments CGA   Toilet Transfer   Toilet Transfer Comments SBA   Balance   Balance Comments No overt LOB noted   Activities of Daily Living   BADL Assessment/Intervention lower body dressing   Lower Body Dressing Assessment/Training   Comment, (Lower Body Dressing) [facility] for safety   Clinical Impression   Criteria for Skilled Therapeutic Interventions Met (OT) Yes, treatment indicated   OT Diagnosis Decreased ind with I/ADLs   OT Problem List-Impairments impacting ADL problems related to;activity tolerance impaired;mobility;balance;strength   Assessment of Occupational Performance 1-3 Performance Deficits   Identified Performance Deficits LB dressing, toilet transfers, toileting   Planned Therapy Interventions (OT) ADL retraining;IADL retraining;transfer training   Clinical Decision Making Complexity (OT) low complexity   Anticipated Equipment Needs Upon Discharge (OT) reacher;toileting equipment;other (see comments)  (Toilet elsa, sock aide, long handled sponge)   Risk & Benefits of therapy have been explained patient   OT Total Evaluation Time   OT Eval, Low Complexity Minutes (84554) 10   OT Goals   Therapy Frequency (OT) Daily   OT Predicted Duration/Target Date for  Goal Attainment 07/26/23   OT Goals Hygiene/Grooming;Upper Body Dressing;Lower Body Dressing;Toilet Transfer/Toileting   OT: Hygiene/Grooming modified independent;within precautions;while standing  (FWW, brace on)   OT: Upper Body Dressing Modified independent;within precautions  (Including TLSO management)   OT: Lower Body Dressing Modified independent;using adaptive equipment;within precautions  (FWW, using AE)   OT: Toilet Transfer/Toileting Modified independent;toilet transfer;cleaning and garment management;using adaptive equipment  (commode over toilet, FWW)   Self-Care/Home Management   Self-Care/Home Mgmt/ADL, Compensatory, Meal Prep Minutes (18916) 25   Symptoms Noted During/After Treatment (Meal Preparation/Planning Training) fatigue   Treatment Detail/Skilled Intervention Pt greeted in chair, agreeable to OT. Pt educated on spinal precautions and wearing TLSO brace OOB. Pt completes sit > stand from chair wih CGA for safety, increased time for transition into standing, FWW. Pt mobilizes to BR with CGA progressing to SBA, FWW, VCs for walker safety. Pt completes toilet transfer with SBA, FWW, commode over toilet, VCs to reach back for arm rests. Pt completes toileting with Min A for wiping - pt noted to have soiled brief, able to wipe selfwithin precautions, however needs therapist assist for thoroughness. Pt CGA for brief change at toilet - pt educated on use of reacher to don clean brief, completes and dons brief with reacher with CGA to pull up brief in standing. Pt mobilizes back to EOB with SBA, FWW and sits EOB with SBA and FWW. Pt able to doff TLSO with SBA - VCs for technique. Pt completes sitting EOB > supine with Min A to advances BLEs onto bed, VCs/tactile cues to complete logroll technique. Pt able to scoot self up in bed with HOB flat. Pt provided with AE handout - educated on how to obtain. Pt supine in bed with needs met, items in reach, alarm set and RN updated.   OT Discharge Planning   OT  Plan AE education (pt has handout) with toileting (toilet buddy vs toilet tongs) and LB dressing (reacher, sock aife, shoe horn), bathing (long handled sponge); g/h sinkside   OT Discharge Recommendation (DC Rec) home with assist   OT Rationale for DC Rec Pt functioning slightly below baseline level with noted impairments in mobility, strength, balance, and spinal precautions, impacting overall I/ADL ind. Pt resides alone, however, pt will have 24/7 supervision and assist for I/ADLs from family as needed. Rec initial assist and supervision with bathing, and heavy IADLs, once pt is medically ready.   Total Session Time   Timed Code Treatment Minutes 25   Total Session Time (sum of timed and untimed services) 35

## 2023-07-19 NOTE — PROGRESS NOTES
[facility]    Internal Medicine Hospitalist Progress Note  07/19/2023  I evaluated patient on the above date.    J Luis Argueta Jr., MD  [phone] (p)  Text Page  Vocera        Assessment & Plan New actions/orders today (07/19/2023) are underlined. All lab results in the assessment and plan were reviewed.    Leesa Jacinto is a very pleasant 83 year old female with past medical history including PAF on chronic anticoagulation; HLD; CVA history; hypothyroidism; depression/anxiety; peripheral neuropathy; osteoporosis; h/o Takotsubo cardiomyopathy; and h/o C. Diff; who presented 7/14/2023 with acute back pain after a fall.'    On initially evaluation 7/14, VSS. ECG with SR with NSTWA, no acute ischemic changes. Labs notable for BMP with bicarb 18; CBC unremarkable; LFT's normal. Head CT 7/14 negative for acute findings. '    CT T-spine 7/14 showed a recent-appearing moderate burst compression fracture deformity of the T3 vertebral body with mild retropulsion of bony fragments into the spinal canal; chronic-appearing  moderate compression fracture deformity of the T12 vertebral body; no spinal canal stenosis.    CT L-spine 7/14 showed no acute fractures; multiple areas of degenerative/chronic changes.    CT C-spine 7/14 showed no acute fractures; degenerative changes.    CT CAP 7/14 showed age indeterminate compression deformities at T3, T12, and L2; no other acute traumatic abnormality identified; a few indeterminate pulmonary nodules; mild biliary ductal prominence.    MRI T-spine 7/14 showed recent-appearing moderate burst compression fracture deformity of the T3 vertebral body again noted; chronic-appearing fracture of the T12 vertebral body again noted; bone marrow edema in the T3 vertebral body consistent with recent fracture; bone marrow edema in the T2 vertebral body possibly due to altered biomechanics at T2-T3, marrow signal otherwise normal, no other evidence for fracture  or pathologic bony lesion; mixed signal intensity nodule in the T8 vertebral body that could represent an atypical hemangioma.      Traumatic (fall) moderate burst compression fracture of T3 with mild retropulsion of bony fragments into the spinal canal.  * Initial presentation as above. Neurosurgery consulted on admit.  * Started on multiple pain meds including lidocaine patch, PRN oxycodone, PRN cyclobenzaprine.   * Neurosurgery recommended TLSO.   * Therapies recommended TCU.  - Continue TLSO when out of bed.  - Continue lifting restriction between 5 and 10 pounds.  - Continue lidocaine patch 4% q24h; PRN acetaminophen 650 mg q6h; PRN cyclobenzaprine 10 mg q8h; PRN oxycodone 2.5-5 mg q6h; PRN IV hydromorphone 0.2-0.3 mg q4h; minimize opioids as able.  - Continue PT, OT; planning on discharge home on Friday per the patient. She requests I do not contact her children who are setting up home care  - Follow-up with Neurosurgery in 6 weeks with x-ray prior.    E. Coli UTI.  Gross hematuria, suspect related to above with concomitant anticoagulation.  Severe bladder pain spasms/dysuria, suspect chronic component and worsened by UTI.  History of recurrent UTI.  * Managed by primary doctor. Generally prefers to avoid antibiotics, due to risk of colitis. Regularly uses over-the-counter AZO (phenazopyridine).   * On 7/16, noted with gross hematuria, dysuria. UA grossly abnormal. Started on ceftriaxone. UC growing E. Coli.   * On 7/17, pt c/o severe episodes of bladder pain/dysuria. She had been taking phenazopyridine OTC with good effect for ~1 year, usually about 1x/week. Pt and daughter were upset that this had been stopped. Discussed with them that phenazopyridine was not recommended with pt's renal function (normal cr, but crcl in 20's-30's). Discussed risks with using phenazopyridine with decreased cr clearance and potential side effects; given no adverse effects with once a month usage for the last year, pt seemed to  have tolerated; as such, ordered phenazopyridine 100 mg x1 with pt and family aware of the potential risks.  * On 7/18, pt/family still concerned about episodes of severe dysuria/bladder pain.  - Order oxybutynin 5 mg TID.  - Continue ceftriaxone (started 7/16) - stop after 7/20.  - Continue to monitor hematuria.    History of C. difficile colitis.  * Started on PO vanco prophylaxis 7/17.  - Continue PO vanco prophylaxis BID - stop after 7/27.    Acute kidney injury, suspect prerenal.  * Creatinine 0.8-0.9 at baseline.   * Cr 1.03 on 7/15.   * Pt given IVF's 7/17.  Recent Labs   Lab 07/18/23  0647 07/17/23  1243 07/15/23  0724 07/14/23  1051   CR 0.79 0.88 1.03* 0.85   - Continue to monitor BMP - repeat in am.  - Avoid nephrotoxic medications.    Elevated transaminases, unclear etiology.  * LFT's normal on admit. CT CAP 7/14 showed no acute hepatic abnormality, gallbladder not seen, mild intrahepatic biliary ductal prominence.   * Abdominal US 7/17 negative.  Recent Labs   Lab 07/18/23  0647 07/17/23  1243 07/16/23  1542 07/15/23  0724 07/14/23  1051   ALBUMIN  --  3.7  --  3.5 3.9   BILITOTAL  --  0.5  --  0.9 0.6   ALT  --  67*  --  81* 29   AST  --  50*  --  106* 29   ALKPHOS  --  134*  --  92 71   PROTEIN  --   --  30*  --   --    CR 0.79 0.88  --  1.03* 0.85   - Continue to monitor LFT's.    Indeterminate pulmonary nodules.  * Initial presentation and imaging as above. CT CAP 7/14 also showed a few indeterminate pulmonary nodules.  - Follow-up with PCP for follow-up imaging if indicated.    PAF on chronic AC.  CVA (2019) history felt to be related to intracranial atherosclerosis.  Cerebrovascular disease.  * H/o stroke in 2019 thought secondary to intracranial atherosclerosis with left PRISCA cerebral infarction. In 8/2022, admitted with left arm and leg weakness and limb shaking of LLE; imaging negative for acute stroke. Head CTA 8/2022 showed bilateral M2 segments demonstrated multiple stenoses ranging from mild  "to severe; bilateral A2/A3 segments with history multiple stenoses ranging from mild to severe; right supraclinoid ICA demonstrated a small 2 mm inferior outpouching may reflect infundibulum with poorly visualized apical artery or small aneurysm. Started on cilostazol 8/2023.  - Continue apixaban, cilostazol, atorvastatin.    Depression/anxiety.  - Continue fluoxetine.    Peripheral neuropathy.  - Continue gabapentin.    Takotsubo cardiomyopathy in 2015, resolved.   - Noted.      Clinically Significant Risk Factors                                    COVID-19 testing.  COVID-19 PCR Results        10/7/2021    17:39 8/23/2022    12:12   COVID-19 PCR Results   COVID-19 Virus by PCR (External Result) Negative        SARS CoV2 PCR  Negative     Details          This result is from an external source.         COVID-19 Antibody Results, Testing for Immunity         No data to display                Diet: Combination Diet Regular Diet Adult    Prophylaxis: PCD's, ambulation. On apixaban.  Limon Catheter: Not present  Lines: None     Code Status: No CPR- Do NOT Intubate    Disposition Plan   Expected discharge: 2d recommended to home with 24h supervision vs TCU pending improvement in bladder pain/spasms, family arrangement of 24h cares vs TCU bed availability if pt decides.  Entered: Koby Jones DO 07/19/2023, 4:06 PM     Communication.  - I d/w pt's son and daughter 7/18.      Interval History   Pain overall improving on the current pain regimen  Had a BM yesterday    -Data reviewed today: I reviewed all new labs and imaging over the last 24 hours. I personally reviewed no images or EKG's today.    Physical Exam    , Blood pressure 134/72, pulse 76, temperature 97.9  F (36.6  C), temperature source Oral, resp. rate 18, height 1.499 m (4' 11\"), weight 45.7 kg (100 lb 12 oz), SpO2 95 %. O2 Device: None (Room air)    Vitals:    07/14/23 1700   Weight: 45.7 kg (100 lb 12 oz)     Vital Signs with Ranges  Temp:  [97.3  F " (36.3  C)-98  F (36.7  C)] 97.9  F (36.6  C)  Pulse:  [70-80] 76  Resp:  [14-18] 18  BP: (114-150)/(40-72) 134/72  SpO2:  [95 %-97 %] 95 %  Patient Vitals for the past 24 hrs:   BP Temp Temp src Pulse Resp SpO2   07/19/23 1500 134/72 97.9  F (36.6  C) Oral 76 18 95 %   07/19/23 1426 114/40 97.3  F (36.3  C) Oral 70 16 95 %   07/19/23 0749 126/62 97.9  F (36.6  C) Oral 80 16 97 %   07/19/23 0426 (!) 150/61 98  F (36.7  C) Oral 76 14 95 %   07/18/23 2347 119/60 97.6  F (36.4  C) Oral 79 16 95 %     I/O's Last 24 hours  I/O last 3 completed shifts:  In: 420 [P.O.:420]  Out: 1150 [Urine:1150]    Constitutional: Awake, alert, oriented, pleasant.  Respiratory: Diminished in bases. No crackles or wheezes.  Cardiovascular: RRR, no m/r/g.  GI: Soft, nt, nd, +BS.  Skin/Integumen:   Other:        Data    Labs reviewed.  Recent Labs   Lab 07/18/23  0647 07/17/23  1243 07/15/23  0724 07/14/23  1051   WBC 4.5 6.0 6.9 10.7   HGB 10.5* 11.6* 11.1* 11.9   MCV 94 94 96 91    201 169 215    141 139 141   POTASSIUM 3.5 3.2* 3.8 3.5   CHLORIDE 112* 109* 107 108*   CO2 21* 20* 22 18*   BUN 19.9 26.9* 24.5* 20.2   CR 0.79 0.88 1.03* 0.85   ANIONGAP 9 12 10 15   DEBI 8.1* 8.5* 8.5* 8.8   GLC 92 134* 95 98   ALBUMIN  --  3.7 3.5 3.9   PROTTOTAL  --  6.1* 5.6* 6.0*   BILITOTAL  --  0.5 0.9 0.6   ALKPHOS  --  134* 92 71   ALT  --  67* 81* 29   AST  --  50* 106* 29     No lab results found.  Recent Labs   Lab 07/18/23  0647 07/17/23  1243 07/15/23  0724 07/14/23  1051   GLC 92 134* 95 98      Recent Labs   Lab Test 08/21/22  1929   A1C 5.3        No results for input(s): INR, [id number] in the last 168 hours.  Recent Labs   Lab 07/18/23  0647 07/17/23  1243 07/15/23  0724 07/14/23  1051   WBC 4.5 6.0 6.9 10.7       MICRO:  CULTURES (INCLUDING BLOOD AND URINE):  Recent Labs   Lab 07/16/23  1542   CULTURE >100,000 CFU/mL Escherichia coli*       No results found for this or any previous visit (from the past 24  hour(s)).    Medications   All medications were reviewed.    Infusions:    Scheduled Medications:    acetaminophen  650 mg Oral Q6H     apixaban ANTICOAGULANT  2.5 mg Oral BID     atorvastatin  20 mg Oral Daily     cefTRIAXone  1 g Intravenous Q24H     cilostazol  100 mg Oral BID     FLUoxetine  40 mg Oral QPM     gabapentin  300 mg Oral At Bedtime     lactobacillus rhamnosus (GG)  1 capsule Oral BID     levothyroxine  50 mcg Oral QPM     lidocaine  1 patch Transdermal Q24h    And     lidocaine   Transdermal Q8H ALONZO     multivitamin w/minerals  1 tablet Oral Daily     oxybutynin  5 mg Oral TID     polyethylene glycol  17 g Oral Daily     sennosides  8.6 mg Oral BID     sodium chloride (PF)  3 mL Intracatheter Q8H     vancomycin  125 mg Oral BID     vitamin D3  50 mcg Oral QPM     PRN Medications:  cyclobenzaprine, HYDROmorphone, lidocaine 4%, lidocaine (buffered or not buffered), melatonin, naloxone **OR** naloxone **OR** naloxone **OR** naloxone, ondansetron, oxyCODONE IR, polyethylene glycol-propylene glycol PF, senna-docusate **OR** senna-docusate, sodium chloride (PF)

## 2023-07-20 ENCOUNTER — APPOINTMENT (OUTPATIENT)
Dept: OCCUPATIONAL THERAPY | Facility: CLINIC | Age: 83
DRG: 551 | End: 2023-07-20
Payer: COMMERCIAL

## 2023-07-20 ENCOUNTER — APPOINTMENT (OUTPATIENT)
Dept: PHYSICAL THERAPY | Facility: CLINIC | Age: 83
DRG: 551 | End: 2023-07-20
Payer: COMMERCIAL

## 2023-07-20 PROCEDURE — 250N000011 HC RX IP 250 OP 636: Mod: JZ | Performed by: INTERNAL MEDICINE

## 2023-07-20 PROCEDURE — 97530 THERAPEUTIC ACTIVITIES: CPT | Mod: GP

## 2023-07-20 PROCEDURE — 250N000013 HC RX MED GY IP 250 OP 250 PS 637: Performed by: NURSE PRACTITIONER

## 2023-07-20 PROCEDURE — 97535 SELF CARE MNGMENT TRAINING: CPT | Mod: GO

## 2023-07-20 PROCEDURE — 120N000001 HC R&B MED SURG/OB

## 2023-07-20 PROCEDURE — 250N000013 HC RX MED GY IP 250 OP 250 PS 637: Performed by: INTERNAL MEDICINE

## 2023-07-20 PROCEDURE — 99233 SBSQ HOSP IP/OBS HIGH 50: CPT | Performed by: HOSPITALIST

## 2023-07-20 PROCEDURE — 97116 GAIT TRAINING THERAPY: CPT | Mod: GP

## 2023-07-20 PROCEDURE — 250N000013 HC RX MED GY IP 250 OP 250 PS 637: Performed by: PHYSICIAN ASSISTANT

## 2023-07-20 PROCEDURE — 999N000226 HC STATISTIC SLP IP EVAL DEFER: Performed by: SPEECH-LANGUAGE PATHOLOGIST

## 2023-07-20 RX ADMIN — MULTIPLE VITAMINS W/ MINERALS TAB 1 TABLET: TAB at 08:23

## 2023-07-20 RX ADMIN — Medication 1 CAPSULE: at 21:12

## 2023-07-20 RX ADMIN — ATORVASTATIN CALCIUM 20 MG: 20 TABLET, FILM COATED ORAL at 21:12

## 2023-07-20 RX ADMIN — LEVOTHYROXINE SODIUM 50 MCG: 50 TABLET ORAL at 06:35

## 2023-07-20 RX ADMIN — OXYBUTYNIN CHLORIDE 5 MG: 5 TABLET ORAL at 21:12

## 2023-07-20 RX ADMIN — ACETAMINOPHEN 650 MG: 325 TABLET, FILM COATED ORAL at 23:11

## 2023-07-20 RX ADMIN — FLUOXETINE 40 MG: 20 CAPSULE ORAL at 21:13

## 2023-07-20 RX ADMIN — ACETAMINOPHEN 650 MG: 325 TABLET, FILM COATED ORAL at 00:21

## 2023-07-20 RX ADMIN — CILOSTAZOL 100 MG: 100 TABLET ORAL at 21:13

## 2023-07-20 RX ADMIN — CEFTRIAXONE SODIUM 1 G: 1 INJECTION, POWDER, FOR SOLUTION INTRAMUSCULAR; INTRAVENOUS at 23:07

## 2023-07-20 RX ADMIN — Medication 50 MCG: at 21:13

## 2023-07-20 RX ADMIN — OXYCODONE HYDROCHLORIDE 5 MG: 5 TABLET ORAL at 21:28

## 2023-07-20 RX ADMIN — GABAPENTIN 300 MG: 300 CAPSULE ORAL at 21:12

## 2023-07-20 RX ADMIN — APIXABAN 2.5 MG: 2.5 TABLET, FILM COATED ORAL at 08:23

## 2023-07-20 RX ADMIN — OXYBUTYNIN CHLORIDE 5 MG: 5 TABLET ORAL at 08:23

## 2023-07-20 RX ADMIN — OXYBUTYNIN CHLORIDE 5 MG: 5 TABLET ORAL at 13:41

## 2023-07-20 RX ADMIN — VANCOMYCIN HYDROCHLORIDE 125 MG: 125 CAPSULE ORAL at 08:23

## 2023-07-20 RX ADMIN — VANCOMYCIN HYDROCHLORIDE 125 MG: 125 CAPSULE ORAL at 21:12

## 2023-07-20 RX ADMIN — ACETAMINOPHEN 650 MG: 325 TABLET, FILM COATED ORAL at 12:13

## 2023-07-20 RX ADMIN — LIDOCAINE 1 PATCH: 560 PATCH PERCUTANEOUS; TOPICAL; TRANSDERMAL at 21:18

## 2023-07-20 RX ADMIN — CEFTRIAXONE SODIUM 1 G: 1 INJECTION, POWDER, FOR SOLUTION INTRAMUSCULAR; INTRAVENOUS at 00:23

## 2023-07-20 RX ADMIN — ACETAMINOPHEN 650 MG: 325 TABLET, FILM COATED ORAL at 17:02

## 2023-07-20 RX ADMIN — APIXABAN 2.5 MG: 2.5 TABLET, FILM COATED ORAL at 21:13

## 2023-07-20 RX ADMIN — Medication 1 CAPSULE: at 10:11

## 2023-07-20 RX ADMIN — ACETAMINOPHEN 650 MG: 325 TABLET, FILM COATED ORAL at 06:35

## 2023-07-20 RX ADMIN — CILOSTAZOL 100 MG: 100 TABLET ORAL at 08:23

## 2023-07-20 ASSESSMENT — ACTIVITIES OF DAILY LIVING (ADL)
ADLS_ACUITY_SCORE: 42

## 2023-07-20 NOTE — CONSULTS
"CLINICAL NUTRITION SERVICES  -  ASSESSMENT NOTE    Recommendations Ordered by Registered Dietitian (RD):   - Calorie-dense supplements daily at minimum  - meals TID, add snacks between meals  - continue thera vit m  - calorie- and protein- dense foods/condiments for no further wt loss   Malnutrition:       % Weight Loss:  None noted  % Intake:  Decreased intake does not meet criteria for malnutrition   Subcutaneous Fat Loss:  Orbital region severe depletion, Upper arm region severe depletion and Thoracic region severe depletion  Muscle Loss:  Temporal region severe depletion and Dorsal hand region severe depletion  Fluid Retention:  None noted    Malnutrition Diagnosis: Severe malnutrition  In Context of:  Chronic illness or disease     REASON FOR ASSESSMENT  Leesa Jacinto is a 83 year old female seen by Registered Dietitian for Patient/Family Request \"Pt request d/t weight loss concerns. Pt drinks boost at home\"    NUTRITION HISTORY  - Information obtained from patient, chart, and daughter at bedside.   - progressive wt loss over the past several years noted.   - pt eats meals TID at home. She fixes breakfast and lunch, dinner is brought weekly by a delivery service ([employer]). She has frozen dinners when needed.   - drinks a boost every other day. Says they fill her up quite a bit.       CURRENT NUTRITION ORDERS  Diet Order:     Regular     Current Intake/Tolerance:  Eating % of meals TID, pt reports she most often consumes 50%. Appetite is low, but her daughter has been encouraging and making sure pt eats. Wants ensure daily.     NUTRITION FOCUSED PHYSICAL ASSESSMENT FOR DIAGNOSING MALNUTRITION)  Yes          Observed:    Muscle wasting (refer to documentation in Malnutrition section) and Subcutaneous fat loss (refer to documentation in Malnutrition section)   Frail, low body weight     ANTHROPOMETRICS  Height: 4' 11\"  Weight: 100 lbs 12 oz (45.7 kg)  Body mass index is 20.35 kg/m .  Weight " Status:  Normal BMI  IBW: 44 kg   % IBW: 104%  Weight History: pt reports weighing 110# 3-4 years ago.   Wt Readings from Last 10 Encounters:   07/14/23 45.7 kg (100 lb 12 oz)   08/22/22 46.2 kg (101 lb 12.8 oz)       LABS  Labs reviewed    MEDICATIONS  Medications reviewed  culturelle   Levothyroxine   thera vit m  Vit D    ASSESSED NUTRITION NEEDS PER APPROVED PRACTICE GUIDELINES:  Dosing Weight 46 kg   Estimated Energy Needs: 7424-7635 kcals (30-35 Kcal/Kg)  Justification: repletion and underweight  Estimated Protein Needs: 69+ grams protein (1.5 g pro/Kg)  Justification: Repletion and preservation of lean body mass  Estimated Fluid Needs: 1 mL/kcal   Justification: maintenance    MALNUTRITION:  % Weight Loss:  None noted  % Intake:  Decreased intake does not meet criteria for malnutrition   Subcutaneous Fat Loss:  Orbital region severe depletion, Upper arm region severe depletion and Thoracic region severe depletion  Muscle Loss:  Temporal region severe depletion and Dorsal hand region severe depletion  Fluid Retention:  None noted    Malnutrition Diagnosis: Severe malnutrition  In Context of:  Chronic illness or disease    NUTRITION DIAGNOSIS:  Malnutrition related to inadequate oral intake at baseline to maintain weight as evidenced by progressive wt loss over the past several years, low fat and muscle stores.       NUTRITION INTERVENTIONS  Recommendations / Nutrition Prescription  Regular   Ensure @ 10 am  Continue micronutrients       - Calorie-dense supplements daily at minimum  - meals TID, add snacks between meals  - continue thera vit m  - calorie- and protein- dense foods/condiments for no further wt loss    Implementation  Nutrition education: Provided education on see above  Medical Food Supplement; ordered    Nutrition Goals  Intake of >50% adequate meals TID and 1 supplement daily.     MONITORING AND EVALUATION:  Progress towards goals will be monitored and evaluated per protocol and Practice  Guidelines    Matilde Barrios RD, LD  Pager: [phone]  Weekend Pager: [phone]

## 2023-07-20 NOTE — PLAN OF CARE
DATE & TIME: 7/19/23 9026-4056  Cognitive Concerns/ Orientation : A/O x4   BEHAVIOR & AGGRESSION TOOL COLOR: green  CIWA SCORE: N/A   ABNL VS/O2: VSS on RA  MOBILITY: A/1 GB/W, back brace on when OOB  PAIN MANAGMENT: PRN oxy, sched tylenol  DIET: regular  BOWEL/BLADDER: incontinent, purewick. BM x1  ABNL LAB/BG: hbg 10.5  DRAIN/DEVICES: PIV SL  TELEMETRY RHYTHM: N/A  SKIN: blanchable redness to bottom  TESTS/PROCEDURES: N/A  D/C DATE: Pending TCU placement  OTHER IMPORTANT INFO: back brace when oob

## 2023-07-20 NOTE — PLAN OF CARE
SLP:  orders received, chart reviewed and discussed with pt/daughter. Pt already finished breakfast. Pt reported symptoms of chronic, intermittent globus sensation (pointing to sternal notch), early satiety, and recent concern for weight loss. Pt denied any coughing or s/sx of aspiration with intake. Prior VFSS normal per report. No hx of GI intervention. Reviewed options as pt would like to avoid invasive measures.     Dietician consulted as pt drinks boost at home and is concerned with weight loss and new fracture. Per notes, planned discharge Friday. Would consider Outpatient GI consult and consideration for EGD. If pt remains inpatient, could consider Esophagram to further assess if pt is agreeable. Will continue to follow.

## 2023-07-20 NOTE — PLAN OF CARE
Goal Outcome Evaluation:       Summary: Fall at home w/ back injury. Compression fracture of T3. + UTI       DATE & TIME: 7/19/23-7/20/23 2760-1047    Cognitive Concerns/ Orientation : A/O x4   BEHAVIOR & AGGRESSION TOOL COLOR: green  CIWA SCORE: N/A   ABNL VS/O2: VSS on RA  MOBILITY: SBA GB/W, back brace on when OOB - per Neuro surgery.  PAIN MANAGMENT: PRN oxy x1 - effective, sched. Tylenol.  DIET: regular  BOWEL/BLADDER: incontinent of urine, purewick in place. No BM this shift - 1 loose BM 7/19 during day.  ABNL LAB/BG: Hgb 10.5, urine: E. Coli  DRAIN/DEVICES: new L PIV placed, SL  TELEMETRY RHYTHM: N/A  SKIN: blanchable redness to coccyx/sacrum  TESTS/PROCEDURES: N/A  D/C DATE: Pending TCU placement, may discharge home back to apt. Pt states looking into home health.  OTHER IMPORTANT INFO: back brace must be worn for fracture when OOB.

## 2023-07-20 NOTE — PLAN OF CARE
Problem: Plan of Care - These are the overarching goals to be used throughout the patient stay.    Goal: Plan of Care Review  Description: The Plan of Care Review/Shift note should be completed every shift.  The Outcome Evaluation is a brief statement about your assessment that the patient is improving, declining, or no change.  This information will be displayed automatically on your shift note.  Outcome: Progressing     Problem: Orthopaedic Fracture  Goal: Optimal Pain Control and Function  Outcome: Progressing  Intervention: Manage Acute Orthopaedic-Related Pain  Recent Flowsheet Documentation  Taken 7/20/2023 1213 by Demetria Cannon, RN  Pain Management Interventions: medication (see MAR)   Goal Outcome Evaluation:       Summary: Fall at home w/ back injury. Compression fracture of T3. + UTI   DATE & TIME: 7/20/23 1239-9120  Cognitive Concerns/ Orientation : A/O x4   BEHAVIOR & AGGRESSION TOOL COLOR: green  CIWA SCORE: N/A   ABNL VS/O2: VSS on RA  MOBILITY: SBA GB/W, back brace on when OOB - per Neuro surgery.  PAIN MANAGMENT: sched. Tylenol.  DIET: regular  BOWEL/BLADDER: incontinent of urine of bowel, purewick removed. loose stools today.   ABNL LAB/BG: Hgb 10.5, urine: E. Coli  DRAIN/DEVICES: new L PIV placed, SL  TELEMETRY RHYTHM: N/A  SKIN: intact  TESTS/PROCEDURES: N/A  D/C DATE: Pending TCU placement, may discharge home back to apt. Pt states looking into home health.  OTHER IMPORTANT INFO: back brace must be worn for fracture when OOB.

## 2023-07-20 NOTE — PROGRESS NOTES
[facility]    Internal Medicine Hospitalist Progress Note  07/20/2023  I evaluated patient on the above date.      Assessment & Plan New actions/orders today (07/20/2023) are underlined. All lab results in the assessment and plan were reviewed.    Leesa Jacinto is a very pleasant 83 year old female with past medical history including PAF on chronic anticoagulation; HLD; CVA history; hypothyroidism; depression/anxiety; peripheral neuropathy; osteoporosis; h/o Takotsubo cardiomyopathy; and h/o C. Diff; who presented 7/14/2023 with acute back pain after a fall.'    On initially evaluation 7/14, VSS. ECG with SR with NSTWA, no acute ischemic changes. Labs notable for BMP with bicarb 18; CBC unremarkable; LFT's normal. Head CT 7/14 negative for acute findings. '    CT T-spine 7/14 showed a recent-appearing moderate burst compression fracture deformity of the T3 vertebral body with mild retropulsion of bony fragments into the spinal canal; chronic-appearing  moderate compression fracture deformity of the T12 vertebral body; no spinal canal stenosis.    CT L-spine 7/14 showed no acute fractures; multiple areas of degenerative/chronic changes.    CT C-spine 7/14 showed no acute fractures; degenerative changes.    CT CAP 7/14 showed age indeterminate compression deformities at T3, T12, and L2; no other acute traumatic abnormality identified; a few indeterminate pulmonary nodules; mild biliary ductal prominence.    MRI T-spine 7/14 showed recent-appearing moderate burst compression fracture deformity of the T3 vertebral body again noted; chronic-appearing fracture of the T12 vertebral body again noted; bone marrow edema in the T3 vertebral body consistent with recent fracture; bone marrow edema in the T2 vertebral body possibly due to altered biomechanics at T2-T3, marrow signal otherwise normal, no other evidence for fracture or pathologic bony lesion; mixed signal intensity nodule in the  T8 vertebral body that could represent an atypical hemangioma.      Traumatic (fall) moderate burst compression fracture of T3 with mild retropulsion of bony fragments into the spinal canal.  * Initial presentation as above. Neurosurgery consulted on admit.  * Started on multiple pain meds including lidocaine patch, PRN oxycodone, PRN cyclobenzaprine.   * Neurosurgery recommended TLSO.   * Therapies recommended TCU.  - Continue TLSO when out of bed.  - Continue lifting restriction between 5 and 10 pounds.  - Continue lidocaine patch 4% q24h; PRN acetaminophen 650 mg q6h; PRN cyclobenzaprine 10 mg q8h; PRN oxycodone 2.5-5 mg q6h; PRN IV hydromorphone 0.2-0.3 mg q4h; minimize opioids as able.  - Continue PT, OT; planning on discharge home on Friday per the patient. She requests I do not contact her children who are setting up home care  - Follow-up with Neurosurgery in 6 weeks with x-ray prior.    E. Coli UTI.  Gross hematuria, suspect related to above with concomitant anticoagulation.  Severe bladder pain spasms/dysuria, suspect chronic component and worsened by UTI.  History of recurrent UTI.  * Managed by primary doctor. Generally prefers to avoid antibiotics, due to risk of colitis. Regularly uses over-the-counter AZO (phenazopyridine).   * On 7/16, noted with gross hematuria, dysuria. UA grossly abnormal. Started on ceftriaxone. UC growing E. Coli.   * On 7/17, pt c/o severe episodes of bladder pain/dysuria. She had been taking phenazopyridine OTC with good effect for ~1 year, usually about 1x/week. Pt and daughter were upset that this had been stopped. Discussed with them that phenazopyridine was not recommended with pt's renal function (normal cr, but crcl in 20's-30's). Discussed risks with using phenazopyridine with decreased cr clearance and potential side effects; given no adverse effects with once a month usage for the last year, pt seemed to have tolerated; as such, ordered phenazopyridine 100 mg x1 with  pt and family aware of the potential risks.  * On 7/18, pt/family still concerned about episodes of severe dysuria/bladder pain.  - Order oxybutynin 5 mg TID.  - Continue ceftriaxone (started 7/16) - stop after 7/20.  - Continue to monitor hematuria.    Dysphagia and globus sensation  Acute on chronic issue, but now severe to the point where she was unable to tolerate oral intake per her report  Was able to swallow liquids without difficulty or coughing  Daughter was concerned about anaphylaxis, but no history of that, no rash, and no airway issues  Plan  - SLP  - will order esophagram  - if negative and still significant issues with swallowing orals, may need EGD      History of C. difficile colitis.  * Started on PO vanco prophylaxis 7/17.  - Continue PO vanco prophylaxis BID - stop after 7/27.    Acute kidney injury, suspect prerenal.  * Creatinine 0.8-0.9 at baseline.   * Cr 1.03 on 7/15.   * Pt given IVF's 7/17.  Recent Labs   Lab 07/18/23  0647 07/17/23  1243 07/15/23  0724 07/14/23  1051   CR 0.79 0.88 1.03* 0.85   - Continue to monitor BMP - repeat in am.  - Avoid nephrotoxic medications.    Elevated transaminases, unclear etiology.  * LFT's normal on admit. CT CAP 7/14 showed no acute hepatic abnormality, gallbladder not seen, mild intrahepatic biliary ductal prominence.   * Abdominal US 7/17 negative.  Recent Labs   Lab 07/18/23  0647 07/17/23  1243 07/16/23  1542 07/15/23  0724 07/14/23  1051   ALBUMIN  --  3.7  --  3.5 3.9   BILITOTAL  --  0.5  --  0.9 0.6   ALT  --  67*  --  81* 29   AST  --  50*  --  106* 29   ALKPHOS  --  134*  --  92 71   PROTEIN  --   --  30*  --   --    CR 0.79 0.88  --  1.03* 0.85   - Continue to monitor LFT's.    Indeterminate pulmonary nodules.  * Initial presentation and imaging as above. CT CAP 7/14 also showed a few indeterminate pulmonary nodules.  - Follow-up with PCP for follow-up imaging if indicated.    PAF on chronic AC.  CVA (2019) history felt to be related to  intracranial atherosclerosis.  Cerebrovascular disease.  * H/o stroke in 2019 thought secondary to intracranial atherosclerosis with left PRISCA cerebral infarction. In 8/2022, admitted with left arm and leg weakness and limb shaking of LLE; imaging negative for acute stroke. Head CTA 8/2022 showed bilateral M2 segments demonstrated multiple stenoses ranging from mild to severe; bilateral A2/A3 segments with history multiple stenoses ranging from mild to severe; right supraclinoid ICA demonstrated a small 2 mm inferior outpouching may reflect infundibulum with poorly visualized apical artery or small aneurysm. Started on cilostazol 8/2023.  - Continue apixaban, cilostazol, atorvastatin.    Depression/anxiety.  - Continue fluoxetine.    Peripheral neuropathy.  - Continue gabapentin.    Takotsubo cardiomyopathy in 2015, resolved.   - Noted.      Clinically Significant Risk Factors                          # Severe Malnutrition: based on nutrition assessment, PRESENT ON ADMISSION          COVID-19 testing.  COVID-19 PCR Results        10/7/2021    17:39 8/23/2022    12:12   COVID-19 PCR Results   COVID-19 Virus by PCR (External Result) Negative        SARS CoV2 PCR  Negative     Details          This result is from an external source.         COVID-19 Antibody Results, Testing for Immunity         No data to display                Diet: Combination Diet Regular Diet Adult  Snacks/Supplements Adult: Ensure Enlive; Between Meals    Prophylaxis: PCD's, ambulation. On apixaban.  Limon Catheter: Not present  Lines: None     Code Status: No CPR- Do NOT Intubate    Disposition Plan   Expected discharge: 2d recommended to home with 24h supervision vs TCU pending improvement in bladder pain/spasms, family arrangement of 24h cares vs TCU bed availability if pt decides.  Entered: Koby Jones,  07/20/2023, 11:37 AM     Communication.  - I d/w pt's greg on 7/20      Interval History   Endorses new history of difficulty  "of swallowing solids. Not liquids. Some chronicity, but worse to the point today. Food does become stuck in her throat with regurgitation.     -Data reviewed today: I reviewed all new labs and imaging over the last 24 hours. I personally reviewed no images or EKG's today.    Physical Exam    , Blood pressure 133/60, pulse 82, temperature 98.1  F (36.7  C), temperature source Oral, resp. rate 15, height 1.499 m (4' 11\"), weight 45.7 kg (100 lb 12 oz), SpO2 97 %. O2 Device: None (Room air)    Vitals:    07/14/23 1700   Weight: 45.7 kg (100 lb 12 oz)     Vital Signs with Ranges  Temp:  [97.3  F (36.3  C)-98.1  F (36.7  C)] 98.1  F (36.7  C)  Pulse:  [70-82] 82  Resp:  [15-18] 15  BP: (114-134)/(40-72) 133/60  SpO2:  [95 %-97 %] 97 %  Patient Vitals for the past 24 hrs:   BP Temp Temp src Pulse Resp SpO2   07/20/23 0727 133/60 98.1  F (36.7  C) Oral 82 15 97 %   07/19/23 2150 -- -- -- -- 16 --   07/19/23 2032 122/61 97.6  F (36.4  C) Oral 81 18 96 %   07/19/23 1500 134/72 97.9  F (36.6  C) Oral 76 18 95 %   07/19/23 1426 114/40 97.3  F (36.3  C) Oral 70 16 95 %     I/O's Last 24 hours  I/O last 3 completed shifts:  In: 240 [P.O.:240]  Out: -     Constitutional: Awake, alert, oriented, pleasant.  Respiratory: Diminished in bases. No crackles or wheezes.  Cardiovascular: RRR, no m/r/g.  GI: Soft, nt, nd, +BS.  Skin/Integumen:   Other:        Data    Labs reviewed.  Recent Labs   Lab 07/18/23  0647 07/17/23  1243 07/15/23  0724 07/14/23  1051   WBC 4.5 6.0 6.9 10.7   HGB 10.5* 11.6* 11.1* 11.9   MCV 94 94 96 91    201 169 215    141 139 141   POTASSIUM 3.5 3.2* 3.8 3.5   CHLORIDE 112* 109* 107 108*   CO2 21* 20* 22 18*   BUN 19.9 26.9* 24.5* 20.2   CR 0.79 0.88 1.03* 0.85   ANIONGAP 9 12 10 15   DEBI 8.1* 8.5* 8.5* 8.8   GLC 92 134* 95 98   ALBUMIN  --  3.7 3.5 3.9   PROTTOTAL  --  6.1* 5.6* 6.0*   BILITOTAL  --  0.5 0.9 0.6   ALKPHOS  --  134* 92 71   ALT  --  67* 81* 29   AST  --  50* 106* 29     No lab " results found.  Recent Labs   Lab 07/18/23  0647 07/17/23  1243 07/15/23  0724 07/14/23  1051   GLC 92 134* 95 98      Recent Labs   Lab Test 08/21/22  1929   A1C 5.3        No results for input(s): INR, [id number] in the last 168 hours.  Recent Labs   Lab 07/18/23  0647 07/17/23  1243 07/15/23  0724 07/14/23  1051   WBC 4.5 6.0 6.9 10.7       MICRO:  CULTURES (INCLUDING BLOOD AND URINE):  Recent Labs   Lab 07/16/23  1542   CULTURE >100,000 CFU/mL Escherichia coli*       No results found for this or any previous visit (from the past 24 hour(s)).    Medications   All medications were reviewed.    Infusions:    Scheduled Medications:    acetaminophen  650 mg Oral Q6H     apixaban ANTICOAGULANT  2.5 mg Oral BID     atorvastatin  20 mg Oral Daily     cefTRIAXone  1 g Intravenous Q24H     cilostazol  100 mg Oral BID     FLUoxetine  40 mg Oral QPM     gabapentin  300 mg Oral At Bedtime     lactobacillus rhamnosus (GG)  1 capsule Oral BID     levothyroxine  50 mcg Oral QPM     lidocaine  1 patch Transdermal Q24h     multivitamin w/minerals  1 tablet Oral Daily     oxybutynin  5 mg Oral TID     polyethylene glycol  17 g Oral Daily     sennosides  8.6 mg Oral BID     sodium chloride (PF)  3 mL Intracatheter Q8H     vancomycin  125 mg Oral BID     vitamin D3  50 mcg Oral QPM     PRN Medications:  cyclobenzaprine, HYDROmorphone, lidocaine 4%, lidocaine (buffered or not buffered), melatonin, naloxone **OR** naloxone **OR** naloxone **OR** naloxone, ondansetron, oxyCODONE IR, polyethylene glycol-propylene glycol PF, senna-docusate **OR** senna-docusate, sodium chloride (PF)

## 2023-07-21 ENCOUNTER — APPOINTMENT (OUTPATIENT)
Dept: PHYSICAL THERAPY | Facility: CLINIC | Age: 83
DRG: 551 | End: 2023-07-21
Payer: COMMERCIAL

## 2023-07-21 ENCOUNTER — APPOINTMENT (OUTPATIENT)
Dept: GENERAL RADIOLOGY | Facility: CLINIC | Age: 83
DRG: 551 | End: 2023-07-21
Attending: HOSPITALIST
Payer: COMMERCIAL

## 2023-07-21 ENCOUNTER — APPOINTMENT (OUTPATIENT)
Dept: GENERAL RADIOLOGY | Facility: CLINIC | Age: 83
DRG: 551 | End: 2023-07-21
Attending: INTERNAL MEDICINE
Payer: COMMERCIAL

## 2023-07-21 ENCOUNTER — APPOINTMENT (OUTPATIENT)
Dept: SPEECH THERAPY | Facility: CLINIC | Age: 83
DRG: 551 | End: 2023-07-21
Attending: HOSPITALIST
Payer: COMMERCIAL

## 2023-07-21 ENCOUNTER — APPOINTMENT (OUTPATIENT)
Dept: GENERAL RADIOLOGY | Facility: CLINIC | Age: 83
DRG: 551 | End: 2023-07-21
Attending: PHYSICIAN ASSISTANT
Payer: COMMERCIAL

## 2023-07-21 LAB
ERYTHROCYTE [DISTWIDTH] IN BLOOD BY AUTOMATED COUNT: 14.4 % (ref 10–15)
HCT VFR BLD AUTO: 33.4 % (ref 35–47)
HGB BLD-MCNC: 11.2 G/DL (ref 11.7–15.7)
LACTATE SERPL-SCNC: 1.1 MMOL/L (ref 0.7–2)
MAGNESIUM SERPL-MCNC: 2.1 MG/DL (ref 1.7–2.3)
MCH RBC QN AUTO: 31.5 PG (ref 26.5–33)
MCHC RBC AUTO-ENTMCNC: 33.5 G/DL (ref 31.5–36.5)
MCV RBC AUTO: 94 FL (ref 78–100)
PLATELET # BLD AUTO: 222 10E3/UL (ref 150–450)
RBC # BLD AUTO: 3.56 10E6/UL (ref 3.8–5.2)
TROPONIN T SERPL HS-MCNC: 14 NG/L
TROPONIN T SERPL HS-MCNC: 20 NG/L
WBC # BLD AUTO: 7.1 10E3/UL (ref 4–11)

## 2023-07-21 PROCEDURE — 250N000011 HC RX IP 250 OP 636: Mod: JZ | Performed by: NURSE PRACTITIONER

## 2023-07-21 PROCEDURE — 83605 ASSAY OF LACTIC ACID: CPT

## 2023-07-21 PROCEDURE — 120N000001 HC R&B MED SURG/OB

## 2023-07-21 PROCEDURE — 74221 X-RAY XM ESOPHAGUS 2CNTRST: CPT

## 2023-07-21 PROCEDURE — 93005 ELECTROCARDIOGRAM TRACING: CPT

## 2023-07-21 PROCEDURE — 85018 HEMOGLOBIN: CPT

## 2023-07-21 PROCEDURE — 250N000011 HC RX IP 250 OP 636

## 2023-07-21 PROCEDURE — 97116 GAIT TRAINING THERAPY: CPT | Mod: GP

## 2023-07-21 PROCEDURE — 84484 ASSAY OF TROPONIN QUANT: CPT | Performed by: HOSPITALIST

## 2023-07-21 PROCEDURE — 250N000013 HC RX MED GY IP 250 OP 250 PS 637: Performed by: NURSE PRACTITIONER

## 2023-07-21 PROCEDURE — 250N000013 HC RX MED GY IP 250 OP 250 PS 637

## 2023-07-21 PROCEDURE — 92610 EVALUATE SWALLOWING FUNCTION: CPT | Mod: GN | Performed by: SPEECH-LANGUAGE PATHOLOGIST

## 2023-07-21 PROCEDURE — 97530 THERAPEUTIC ACTIVITIES: CPT | Mod: GP

## 2023-07-21 PROCEDURE — 36415 COLL VENOUS BLD VENIPUNCTURE: CPT | Performed by: HOSPITALIST

## 2023-07-21 PROCEDURE — 250N000013 HC RX MED GY IP 250 OP 250 PS 637: Performed by: INTERNAL MEDICINE

## 2023-07-21 PROCEDURE — 84484 ASSAY OF TROPONIN QUANT: CPT | Performed by: PHYSICIAN ASSISTANT

## 2023-07-21 PROCEDURE — 999N000128 HC STATISTIC PERIPHERAL IV START W/O US GUIDANCE

## 2023-07-21 PROCEDURE — 71045 X-RAY EXAM CHEST 1 VIEW: CPT

## 2023-07-21 PROCEDURE — 93010 ELECTROCARDIOGRAM REPORT: CPT | Mod: 76 | Performed by: INTERNAL MEDICINE

## 2023-07-21 PROCEDURE — 250N000011 HC RX IP 250 OP 636: Mod: JZ | Performed by: HOSPITALIST

## 2023-07-21 PROCEDURE — 258N000002 HC RX IP 258 OP 250: Performed by: HOSPITALIST

## 2023-07-21 PROCEDURE — 74019 RADEX ABDOMEN 2 VIEWS: CPT

## 2023-07-21 PROCEDURE — 92526 ORAL FUNCTION THERAPY: CPT | Mod: GN | Performed by: SPEECH-LANGUAGE PATHOLOGIST

## 2023-07-21 PROCEDURE — 250N000013 HC RX MED GY IP 250 OP 250 PS 637: Performed by: PHYSICIAN ASSISTANT

## 2023-07-21 PROCEDURE — 99233 SBSQ HOSP IP/OBS HIGH 50: CPT | Performed by: HOSPITALIST

## 2023-07-21 PROCEDURE — 99291 CRITICAL CARE FIRST HOUR: CPT

## 2023-07-21 RX ORDER — HYDROMORPHONE HYDROCHLORIDE 1 MG/ML
INJECTION, SOLUTION INTRAMUSCULAR; INTRAVENOUS; SUBCUTANEOUS
Status: DISCONTINUED
Start: 2023-07-21 | End: 2023-07-22 | Stop reason: HOSPADM

## 2023-07-21 RX ORDER — SODIUM CHLORIDE 450 MG/100ML
INJECTION, SOLUTION INTRAVENOUS CONTINUOUS
Status: DISCONTINUED | OUTPATIENT
Start: 2023-07-21 | End: 2023-07-22

## 2023-07-21 RX ORDER — NITROGLYCERIN 0.4 MG/1
0.4 TABLET SUBLINGUAL EVERY 5 MIN PRN
Status: DISCONTINUED | OUTPATIENT
Start: 2023-07-21 | End: 2023-07-25 | Stop reason: HOSPADM

## 2023-07-21 RX ORDER — HYDROMORPHONE HYDROCHLORIDE 1 MG/ML
0.5 INJECTION, SOLUTION INTRAMUSCULAR; INTRAVENOUS; SUBCUTANEOUS ONCE
Status: COMPLETED | OUTPATIENT
Start: 2023-07-21 | End: 2023-07-21

## 2023-07-21 RX ORDER — NITROGLYCERIN 0.4 MG/1
TABLET SUBLINGUAL
Status: COMPLETED
Start: 2023-07-21 | End: 2023-07-21

## 2023-07-21 RX ORDER — HYDROMORPHONE HCL IN WATER/PF 6 MG/30 ML
.2-.3 PATIENT CONTROLLED ANALGESIA SYRINGE INTRAVENOUS
Status: DISCONTINUED | OUTPATIENT
Start: 2023-07-21 | End: 2023-07-25 | Stop reason: HOSPADM

## 2023-07-21 RX ADMIN — APIXABAN 2.5 MG: 2.5 TABLET, FILM COATED ORAL at 10:03

## 2023-07-21 RX ADMIN — CILOSTAZOL 100 MG: 100 TABLET ORAL at 10:03

## 2023-07-21 RX ADMIN — SODIUM CHLORIDE: 4.5 INJECTION, SOLUTION INTRAVENOUS at 13:53

## 2023-07-21 RX ADMIN — HYDROMORPHONE HYDROCHLORIDE 0.5 MG: 1 INJECTION, SOLUTION INTRAMUSCULAR; INTRAVENOUS; SUBCUTANEOUS at 17:15

## 2023-07-21 RX ADMIN — LEVOTHYROXINE SODIUM 50 MCG: 50 TABLET ORAL at 06:58

## 2023-07-21 RX ADMIN — ONDANSETRON 4 MG: 2 INJECTION INTRAMUSCULAR; INTRAVENOUS at 17:45

## 2023-07-21 RX ADMIN — ACETAMINOPHEN 650 MG: 325 TABLET, FILM COATED ORAL at 06:58

## 2023-07-21 RX ADMIN — VANCOMYCIN HYDROCHLORIDE 125 MG: 125 CAPSULE ORAL at 10:04

## 2023-07-21 RX ADMIN — HYDROMORPHONE HYDROCHLORIDE 0.2 MG: 0.2 INJECTION, SOLUTION INTRAMUSCULAR; INTRAVENOUS; SUBCUTANEOUS at 15:15

## 2023-07-21 RX ADMIN — HYDROMORPHONE HYDROCHLORIDE 0.2 MG: 0.2 INJECTION, SOLUTION INTRAMUSCULAR; INTRAVENOUS; SUBCUTANEOUS at 22:55

## 2023-07-21 RX ADMIN — NITROGLYCERIN: 0.4 TABLET SUBLINGUAL at 17:46

## 2023-07-21 RX ADMIN — OXYBUTYNIN CHLORIDE 5 MG: 5 TABLET ORAL at 10:03

## 2023-07-21 RX ADMIN — OXYCODONE HYDROCHLORIDE 5 MG: 5 TABLET ORAL at 10:04

## 2023-07-21 ASSESSMENT — ACTIVITIES OF DAILY LIVING (ADL)
ADLS_ACUITY_SCORE: 38
ADLS_ACUITY_SCORE: 40
ADLS_ACUITY_SCORE: 42
ADLS_ACUITY_SCORE: 38
ADLS_ACUITY_SCORE: 40
ADLS_ACUITY_SCORE: 38
ADLS_ACUITY_SCORE: 40
ADLS_ACUITY_SCORE: 42
ADLS_ACUITY_SCORE: 41
ADLS_ACUITY_SCORE: 42

## 2023-07-21 NOTE — PLAN OF CARE
Summary: Fall at home w/ back injury. Compression fracture of T3. + UTI   DATE & TIME: 7/20/23, 19scheduled Tylenol ass,100 - 3347  Cognitive Concerns/ Orientation : A/O x4   BEHAVIOR & AGGRESSION TOOL COLOR: Green  CIWA SCORE: N/A   ABNL VS/O2: VSS on RA  MOBILITY: SBA with GB and walker back brace on when out of bed - per Neuro surgery.  PAIN MANAGMENT: Scheduled Tylenol given for pain along with Prn Oxycodone given for back pain. Helpful per patient. Scheduled Lidocaine patch applied to back  DIET: NPO at midnight  BOWEL/BLADDER: Incontinent of bowels/bladder. BM x 1 this shift. Purewick in place  ABNL LAB/BG: NA  DRAIN/DEVICES: PIV SL  TELEMETRY RHYTHM: N/A  SKIN: Intact  TESTS/PROCEDURES: For Esophagram this morning  D/C DATE: Pending TCU placement, may discharge home back to apartmentt. Pt states looking into home health.  OTHER IMPORTANT INFO: Speech, OT and PT following. Back brace must be worn for fracture when getting out of bed            Goal Outcome Evaluation:      Plan of Care Reviewed With: patient    Overall Patient Progress: improvingOverall Patient Progress: improving

## 2023-07-21 NOTE — PROGRESS NOTES
CLINICAL SWALLOW EVALUATION       07/21/23 1596   Appointment Info   Signing Clinician's Name / Credentials (SLP) Dianelys Vernon M.A., CCC-SLP, [facility]   General Information   Onset of Illness/Injury or Date of Surgery 07/14/23   Referring Physician Dr. Jones   Patient/Family Therapy Goal Statement (SLP) Patient would like to continue eating some food orally.   Pertinent History of Current Problem Patient admitted with UTI and T3 compression fracture after a fall at home.  Patient's past medical history includes PAF on chronic anticoagulation; HLD; CVA history; hypothyroidism; depression/anxiety; peripheral neuropathy; osteoporosis; h/o Takotsubo cardiomyopathy; and h/o C. Diff.  She also describes a IBS diagnosis when she was in her 40s that led to feeding tube placement and eventual 25 year period of ONLY consuming liquid tube feeding formula orally.  She reports she eventually desired to try solid foods when she moved with her  to an assisted living facility, and she began eating regular textures at that time in small amounts, weaning from taking the tube feeding formula.  Now, she and her family endorse some recent weight loss and difficulty getting enough calories, partially due to globus sensation and feeling of early satiety.  She is not vomiting or regurgitating food but underwent esophagram this morning which indicated dysmotility/presbyesophagus, and pill retention above the GE junction.   General Observations Patient is highly pleasant and cooperative.       Present no   Type of Evaluation   Type of Evaluation Swallow Evaluation   Oral Motor   Oral Musculature generally intact   Structural Abnormalities none present   Mucosal Quality dry  (Very dry)   Dentition (Oral Motor)   Dentition (Oral Motor) natural dentition   Facial Symmetry (Oral Motor)   Facial Symmetry (Oral Motor) WNL   Lip Function (Oral Motor)   Lip Range of Motion (Oral Motor) WNL   Tongue Function (Oral  Motor)   Tongue ROM (Oral Motor) WNL   Jaw Function (Oral Motor)   Jaw Function (Oral Motor) WNL   Cough/Swallow/Gag Reflex (Oral Motor)   Soft Palate/Velum (Oral Motor) WNL   Gag Reflex (Oral Motor) WNL   Volitional Throat Clear/Cough (Oral Motor) WNL   Volitional Swallow (Oral Motor) WNL   Vocal Quality/Secretion Management (Oral Motor)   Vocal Quality (Oral Motor) WNL;strained/strangled  (Patient and family endorsing lower pitch and somewhat strained quality)   Secretion Management (Oral Motor) WNL   General Swallowing Observations   Past History of Dysphagia Esophageal dysphagia hx is extensive   Current Diet/Method of Nutritional Intake (General Swallowing Observations, NIS) thin liquids (level 0);regular diet   Swallowing Evaluation Clinical swallow evaluation   Clinical Swallow Evaluation   Feeding Assistance no assistance needed   Additional evaluation(s) completed today No   Clinical Swallow Evaluation Textures Trialed thin liquids;solid foods   Clinical Swallow Eval: Thin Liquid Texture Trial   Mode of Presentation, Thin Liquids cup;straw;self-fed   Volume of Liquid or Food Presented 8 oz   Oral Phase of Swallow WFL   Pharyngeal Phase of Swallow intact   Diagnostic Statement No overt Sx of aspiration   Clinical Swallow Evaluation: Solid Food Texture Trial   Mode of Presentation self-fed   Volume Presented 1/2 cookie   Oral Phase WFL   Pharyngeal Phase impaired;coughing/choking;feeling of something stuck in throat   Diagnostic Statement Cough x1 first swallow with dry cookie, likely related to dry mucosa.  No subsequent Sx of aspiration.   Esophageal Phase of Swallow   Patient reports or presents with symptoms of esophageal dysphagia Yes   Esophageal comments See esophagram 7/21/23   Swallowing Recommendations   Diet Consistency Recommendations thin liquids (level 0);regular diet  (Choose moist foods)   Supervision Level for Intake distant supervision needed   Mode of Delivery Recommendations bolus size,  small;food moistened   Swallowing Maneuver Recommendations alternate food and liquid intake;double dry swallow;extra swallow   Monitoring/Assistance Required (Eating/Swallowing) stop eating activities when fatigue is present   Recommended Feeding/Eating Techniques (Swallow Eval) patient is independent, no specific recommendations;moisten oral mucosa prior to intake;maintain upright sitting position for eating;maintain upright posture during/after eating for 30 minutes   Medication Administration Recommendations, Swallowing (SLP) Small pills whole with liquid or puree carrier, consider crushing large pills due to esophageal retention.  Perform good liquid wash and upright positioning, '   General Therapy Interventions   Planned Therapy Interventions Dysphagia Treatment   Clinical Impression   Criteria for Skilled Therapeutic Interventions Met (SLP Eval) Yes, treatment indicated   SLP Diagnosis Mild oropharyngeal dysphagia, likely resultant from esophageal dysmotility and mucosal dryness   Risks & Benefits of therapy have been explained evaluation/treatment results reviewed;patient;daughter;son   SLP Total Evaluation Time   Eval: oral/pharyngeal swallow function, clinical swallow Minutes (63647) 25   SLP Goals   Therapy Frequency (SLP Eval) one time eval and treatment only   SLP Predicted Duration/Target Date for Goal Attainment 07/21/23   SLP Goals Swallow   SLP: Safely tolerate diet without signs/symptoms of aspiration Regular diet;Thin liquids;With use of swallow precautions;With use of compensatory swallow strategies;With assistance/supervision  (Assistance for positioning with lumbar fracture)   Interventions   Interventions Quick Adds Swallowing Dysfunction   Swallowing Dysfunction &/or Oral Function for Feeding   Treatment of Swallowing Dysfunction &/or Oral Function for Feeding Minutes (06728) 10   Symptoms Noted During/After Treatment None   Treatment Detail/Skilled Intervention Patient and daughter and son  in law present today.  Trained patient and family re: esophageal motility strategies and safe swallowing precautions, with all able to verbalize comprehension and patient demonstrating use of careful chewing, moistened mucosa, liquid wash after swallow, gravity positioning for intake.  Addressed possible use of Boost type beverages during meal to assist in caloric/nutritive intake but also because she requires a liquid wash for her dry mouth and esophageal dysmotility.  Swallow goals met after 1x treatment today.   SLP Discharge Planning   SLP Plan No further skilled SLP services indicated at this time.   SLP Discharge Recommendation home;home with assist   SLP Rationale for DC Rec Assist for positioning upright at meals due to lumbar fracture, returning to previous environment is otherwise deemed safe per swallowing needs.   SLP Brief overview of current status  Clinical swallow evaluation completed and treatment initiated:  recommend continue regular diet with thin liquids.  Assist for safe upright positioning during and after meals.  Patient needs to also moisten mouth before intake, alternate liquids and solids, chew well, take small bites, eat at slow rate.  Swallow goals met today.   Total Session Time   Total Session Time (sum of timed and untimed services) 35

## 2023-07-21 NOTE — PROGRESS NOTES
Pt complained of chest pain from her inability to swallow that had moved up into her throat. No relief with prn dilaudid. Pt was visibly anxious and saying she could not breathe. O2 placed on Pt. Contacted hospitalist to get orders for pain meds changed, but MD was unavailable. RRT was called.

## 2023-07-21 NOTE — PROGRESS NOTES
[facility]    Internal Medicine Hospitalist Progress Note  07/21/2023  I evaluated patient on the above date.      Assessment & Plan New actions/orders today (07/21/2023) are underlined. All lab results in the assessment and plan were reviewed.    Leesa Jacinto is a very pleasant 83 year old female with past medical history including PAF on chronic anticoagulation; HLD; CVA history; hypothyroidism; depression/anxiety; peripheral neuropathy; osteoporosis; h/o Takotsubo cardiomyopathy; and h/o C. Diff; who presented 7/14/2023 with acute back pain after a fall.'    On initially evaluation 7/14, VSS. ECG with SR with NSTWA, no acute ischemic changes. Labs notable for BMP with bicarb 18; CBC unremarkable; LFT's normal. Head CT 7/14 negative for acute findings. '    CT T-spine 7/14 showed a recent-appearing moderate burst compression fracture deformity of the T3 vertebral body with mild retropulsion of bony fragments into the spinal canal; chronic-appearing  moderate compression fracture deformity of the T12 vertebral body; no spinal canal stenosis.    CT L-spine 7/14 showed no acute fractures; multiple areas of degenerative/chronic changes.    CT C-spine 7/14 showed no acute fractures; degenerative changes.    CT CAP 7/14 showed age indeterminate compression deformities at T3, T12, and L2; no other acute traumatic abnormality identified; a few indeterminate pulmonary nodules; mild biliary ductal prominence.    MRI T-spine 7/14 showed recent-appearing moderate burst compression fracture deformity of the T3 vertebral body again noted; chronic-appearing fracture of the T12 vertebral body again noted; bone marrow edema in the T3 vertebral body consistent with recent fracture; bone marrow edema in the T2 vertebral body possibly due to altered biomechanics at T2-T3, marrow signal otherwise normal, no other evidence for fracture or pathologic bony lesion; mixed signal intensity nodule in the  T8 vertebral body that could represent an atypical hemangioma.    Dysphagia and globus sensation  Chest pain likely secondary to esophageal spasm  Acute on chronic issue, but now severe to the point where she was unable to tolerate oral intake per her report  Was able to swallow liquids without difficulty or coughing  Daughter was concerned about anaphylaxis, but no history of that, no rash, and no airway issues  XR esophagram with dysmotility but no obvious e/o stricture. Of note, she did have a medication that appeared stuck at the GE junction and stricture was unable to be fully ruled out  Plan  - unfortunately, she remains very symptomatic, and felt acute sob with water becoming stuck on 7/21. She notes this is a very significant departure from her baseline. She does not endorse chest pain and her EKG is without ischemic change  - consult GI for EGD given escalation of her severe symptoms. Hold eliquis in anticipation of possibly needing a dilation.   - monitor for aspiration signs, aspiration precautions      Traumatic (fall) moderate burst compression fracture of T3 with mild retropulsion of bony fragments into the spinal canal.  * Initial presentation as above. Neurosurgery consulted on admit.  * Started on multiple pain meds including lidocaine patch, PRN oxycodone, PRN cyclobenzaprine.   * Neurosurgery recommended TLSO.   * Therapies recommended TCU.  - Continue TLSO when out of bed.  - Continue lifting restriction between 5 and 10 pounds.  - Continue lidocaine patch 4% q24h; PRN acetaminophen 650 mg q6h; PRN cyclobenzaprine 10 mg q8h; PRN oxycodone 2.5-5 mg q6h; PRN IV hydromorphone 0.2-0.3 mg q4h; minimize opioids as able.  - Continue PT, OT; planning on discharge home on Friday per the patient. She requests I do not contact her children who are setting up home care  - Follow-up with Neurosurgery in 6 weeks with x-ray prior.    E. Coli UTI.  Gross hematuria, suspect related to above with concomitant  anticoagulation.  Severe bladder pain spasms/dysuria, suspect chronic component and worsened by UTI.   History of recurrent UTI.  * Managed by primary doctor. Generally prefers to avoid antibiotics, due to risk of colitis. Regularly uses over-the-counter AZO (phenazopyridine).   * On 7/16, noted with gross hematuria, dysuria. UA grossly abnormal. Started on ceftriaxone. UC growing E. Coli.   * On 7/17, pt c/o severe episodes of bladder pain/dysuria. She had been taking phenazopyridine OTC with good effect for ~1 year, usually about 1x/week. Pt and daughter were upset that this had been stopped. Discussed with them that phenazopyridine was not recommended with pt's renal function (normal cr, but crcl in 20's-30's). Discussed risks with using phenazopyridine with decreased cr clearance and potential side effects; given no adverse effects with once a month usage for the last year, pt seemed to have tolerated; as such, ordered phenazopyridine 100 mg x1 with pt and family aware of the potential risks.  * On 7/18, pt/family still concerned about episodes of severe dysuria/bladder pain.  - Order oxybutynin 5 mg TID.  - Continue ceftriaxone (started 7/16) - stop after 7/20.   - Continue to monitor hematuria.    History of C. difficile colitis.  * Started on PO vanco prophylaxis 7/17.  - Continue PO vanco prophylaxis BID - stop after 7/27.    Acute kidney injury, suspect prerenal.  * Creatinine 0.8-0.9 at baseline.   * Cr 1.03 on 7/15.   * Pt given IVF's 7/17.  Recent Labs   Lab 07/18/23  0647 07/17/23  1243 07/15/23  0724   CR 0.79 0.88 1.03*   - Continue to monitor BMP - repeat in am.  - Avoid nephrotoxic medications.    Elevated transaminases, unclear etiology.  * LFT's normal on admit. CT CAP 7/14 showed no acute hepatic abnormality, gallbladder not seen, mild intrahepatic biliary ductal prominence.   * Abdominal US 7/17 negative.  Recent Labs   Lab 07/18/23  0647 07/17/23  1243 07/16/23  1542 07/15/23  0724   ALBUMIN   --  3.7  --  3.5   BILITOTAL  --  0.5  --  0.9   ALT  --  67*  --  81*   AST  --  50*  --  106*   ALKPHOS  --  134*  --  92   PROTEIN  --   --  30*  --    CR 0.79 0.88  --  1.03*   - Continue to monitor LFT's.    Indeterminate pulmonary nodules.  * Initial presentation and imaging as above. CT CAP 7/14 also showed a few indeterminate pulmonary nodules.  - Follow-up with PCP for follow-up imaging if indicated.    PAF on chronic AC.  CVA (2019) history felt to be related to intracranial atherosclerosis.  Cerebrovascular disease.  * H/o stroke in 2019 thought secondary to intracranial atherosclerosis with left PRISCA cerebral infarction. In 8/2022, admitted with left arm and leg weakness and limb shaking of LLE; imaging negative for acute stroke. Head CTA 8/2022 showed bilateral M2 segments demonstrated multiple stenoses ranging from mild to severe; bilateral A2/A3 segments with history multiple stenoses ranging from mild to severe; right supraclinoid ICA demonstrated a small 2 mm inferior outpouching may reflect infundibulum with poorly visualized apical artery or small aneurysm. Started on cilostazol 8/2023.  - Continue apixaban (hold for EGD), cilostazol, atorvastatin.    Depression/anxiety.  - Continue fluoxetine.    Peripheral neuropathy.  - Continue gabapentin.    Takotsubo cardiomyopathy in 2015, resolved.   - Noted.      Clinically Significant Risk Factors                          # Severe Malnutrition: based on nutrition assessment           COVID-19 testing.  COVID-19 PCR Results        10/7/2021    17:39 8/23/2022    12:12   COVID-19 PCR Results   COVID-19 Virus by PCR (External Result) Negative        SARS CoV2 PCR  Negative     Details          This result is from an external source.         COVID-19 Antibody Results, Testing for Immunity         No data to display                Diet: Snacks/Supplements Adult: Ensure Enlive; Between Meals  NPO per Anesthesia Guidelines for Procedure/Surgery Except for: Meds   "  Prophylaxis: PCD's, ambulation. On apixaban.  Limon Catheter: Not present  Lines: None     Code Status: No CPR- Do NOT Intubate    Disposition Plan   Expected discharge: 2d recommended to home with 24h supervision vs TCU pending improvement in bladder pain/spasms, family arrangement of 24h cares vs TCU bed availability if pt decides.  Entered: Koby SOPHYArchana Jones,  07/21/2023, 2:17 PM     Communication.  - I d/w pt's greg on 7/20      Interval History   Endorses worse dysphagia, with acute inability to swallow water this afternoon. She felt sob and that water was stuck. Discussed with Dr. Ayers who will do EGD when she is off the eliquis    -Data reviewed today: I reviewed all new labs and imaging over the last 24 hours. I personally reviewed no images or EKG's today.    Physical Exam    , Blood pressure (!) 142/79, pulse 87, temperature 97.9  F (36.6  C), temperature source Oral, resp. rate 16, height 1.499 m (4' 11\"), weight 45.7 kg (100 lb 12 oz), SpO2 96 %. O2 Device: None (Room air)    Vitals:    07/14/23 1700   Weight: 45.7 kg (100 lb 12 oz)     Vital Signs with Ranges  Temp:  [97.9  F (36.6  C)-98.4  F (36.9  C)] 97.9  F (36.6  C)  Pulse:  [79-95] 87  Resp:  [16] 16  BP: (133-151)/(64-89) 142/79  SpO2:  [96 %] 96 %  Patient Vitals for the past 24 hrs:   BP Temp Temp src Pulse Resp SpO2   07/21/23 0759 (!) 142/79 97.9  F (36.6  C) Oral 87 16 --   07/20/23 2312 (!) 151/89 98.4  F (36.9  C) Oral 95 16 96 %   07/20/23 1648 133/64 98  F (36.7  C) Oral 79 16 96 %     I/O's Last 24 hours  I/O last 3 completed shifts:  In: 120 [P.O.:120]  Out: 300 [Urine:300]    Constitutional: Awake, alert, oriented, pleasant.  Respiratory: Diminished in bases. No crackles or wheezes.  Cardiovascular: RRR, no m/r/g.  GI: Soft, nt, nd, +BS.  Skin/Integumen:   Other:        Data    Labs reviewed.  Recent Labs   Lab 07/18/23  0647 07/17/23  1243 07/15/23  0724   WBC 4.5 6.0 6.9   HGB 10.5* 11.6* 11.1*   MCV 94 94 96    " 201 169    141 139   POTASSIUM 3.5 3.2* 3.8   CHLORIDE 112* 109* 107   CO2 21* 20* 22   BUN 19.9 26.9* 24.5*   CR 0.79 0.88 1.03*   ANIONGAP 9 12 10   DEBI 8.1* 8.5* 8.5*   GLC 92 134* 95   ALBUMIN  --  3.7 3.5   PROTTOTAL  --  6.1* 5.6*   BILITOTAL  --  0.5 0.9   ALKPHOS  --  134* 92   ALT  --  67* 81*   AST  --  50* 106*     No lab results found.  Recent Labs   Lab 07/18/23  0647 07/17/23  1243 07/15/23  0724   GLC 92 134* 95      Recent Labs   Lab Test 08/21/22  1929   A1C 5.3        No results for input(s): INR, [id number] in the last 168 hours.  Recent Labs   Lab 07/18/23  0647 07/17/23  1243 07/15/23  0724   WBC 4.5 6.0 6.9       MICRO:  CULTURES (INCLUDING BLOOD AND URINE):  Recent Labs   Lab 07/16/23  1542   CULTURE >100,000 CFU/mL Escherichia coli*       Recent Results (from the past 24 hour(s))   XR Esophagram    Narrative    ESOPHAGRAM DOUBLE CONTRAST 7/21/2023 9:21 AM     HISTORY: Dysphagia esophageal.  TECHNIQUE: 1 minute fluoroscopy utilized. 2 spot images.  COMPARISON: None    FINDINGS: Esophageal dysmotility with decreased secondary stripping  waves and multiple nonperistaltic tertiary waves present. There is  some stasis within the esophagus. No visible stricture, however a 13  mm barium tablet was withheld at the GE junction and does not pass  into the stomach.      Impression    IMPRESSION:  1.  Esophageal dysmotility/presbyesophagus.  2.  No visible strictures with the barium however a 13 mm tablet was  withheld at the GE junction. Findings indeterminant. Follow-up  endoscopy could be considered as clinically warranted.       Medications   All medications were reviewed.    Infusions:    NaCl 50 mL/hr at 07/21/23 1353     Scheduled Medications:    acetaminophen  650 mg Oral Q6H     apixaban ANTICOAGULANT  2.5 mg Oral BID     atorvastatin  20 mg Oral Daily     cilostazol  100 mg Oral BID     FLUoxetine  40 mg Oral QPM     gabapentin  300 mg Oral At Bedtime     lactobacillus rhamnosus (GG)  1  capsule Oral BID     levothyroxine  50 mcg Oral QPM     lidocaine  1 patch Transdermal Q24h     multivitamin w/minerals  1 tablet Oral Daily     oxybutynin  5 mg Oral TID     sodium chloride (PF)  3 mL Intracatheter Q8H     vancomycin  125 mg Oral BID     vitamin D3  50 mcg Oral QPM     PRN Medications:  cyclobenzaprine, HYDROmorphone, lidocaine 4%, lidocaine (buffered or not buffered), melatonin, naloxone **OR** naloxone **OR** naloxone **OR** naloxone, ondansetron, oxyCODONE IR, polyethylene glycol-propylene glycol PF, senna-docusate **OR** senna-docusate, sodium chloride (PF)

## 2023-07-21 NOTE — CONSULTS
GASTROENTEROLOGY CONSULTATION      Leesa Jacinto  [address]  [place] [id number]  83 year old female     Admission Date/Time: 7/14/2023  Primary Care Provider: Carlos Kim     We were asked to see the patient in consultation by Dr. Jones for evaluation of dysphagia.    ASSESSMENT:    1.  Dysphagia- prodrome of chronic, intermittent globus sensation.  Also, over the years 2 times a year with more significant solid food dysphagia.  Increased symptoms yesterday progressing to today.  Esophagram does not show any stricture, however, the barium pill does get hung up at the GE junction.  - Patient is on Eliquis for paroxysmal atrial fibrillation.  Thus, would be preferable to defer the upper endoscopy until 2 to 3 days after the Eliquis has been stopped to allow for intervention, such as a dilation, if there is a prominent stricture present at the GE junction.  - Patient has had difficulty with liquids today, however, she can control her own saliva.  She has only had a small portion of pancakes and eggs, thus do not suspect an acute food impaction.  - The differential diagnosis includes esophageal stricture, atypical appearance of achalasia, etc.    2.  Abdominal distention- she does report this is new, there is no significant pain.  We will evaluate from with an abdominal x-ray.    RECOMMENDATIONS:  - Discussed with Dr. Jones, he will hold Eliquis now.  - N.p.o. today, but try to start clear liquids tomorrow and if tolerating could advance up to a mechanical soft diet over the weekend  - Upper endoscopy with MAC sedation Monday with dilation if indicated, after being off Eliquis, starting today.    Henry Gonzalez MD   [facility]  [phone]      ________________________________________________________________________        HPI:  Leesa Jacinto is a 83 year old female who has a history of atrial fibrillation on anticoagulation, CVA, peripheral  neuropathy, osteoporosis, Takotsubo cardiomyopathy in the past.  She was admitted with a burst compression fracture of her vertebral body.  Hospitalization also complicated complicated by recurrent UTI.  She has had a chronic intermittent globus sensation, however, yesterday felt that she was having more solid food dysphagia.  Today feels like there is dysphagia with liquids as well.  She did have an esophagram this morning in which there was no obvious stricture, however, there was evidence of esophageal dysmotility and the barium pill got hung up at the GE junction.  She has only had a small portion of pancakes and eggs today.  She is able to tolerate her own saliva.    She does describe more of an acute abdominal distention today.  No significant pain.  There is no nausea or vomiting.     ROS: A comprehensive ten point review of systems was negative aside from those in mentioned in the HPI.      PAST MEDICAL HISTORY:  Paroxysmal atrial fibrillation on chronic anticoagulation.  Hyperlipidemia  History of CVA  Hypothyroidism  Depression anxiety  Peripheral neuropathy  Osteoporosis  History of Takotsubo cardiomyopathy  History of C. Difficile    PAST SURGICAL HISTORY:  No past surgical history on file.  SOCIAL HISTORY:  Cholecystectomy, history of gastrostomy jejunostomy tube, cataract removal   FAMILY HISTORY:  No family history on file.  ALLERGIES:   Allergies   Allergen Reactions     Sodium Hypochlorite Shortness Of Breath     Alendronate Diarrhea     Raloxifene Other (See Comments)     RIND on 11/8/2019     Sulfa Antibiotics Rash     Sulfasalazine Rash     MEDICATIONS:   Prior to Admission medications    Medication Sig Start Date End Date Taking? Authorizing Provider   apixaban ANTICOAGULANT (ELIQUIS) 2.5 MG tablet Take 2.5 mg by mouth 2 times daily   Yes Unknown, Entered By History   atorvastatin (LIPITOR) 20 MG tablet Take 20 mg by mouth daily   Yes Unknown, Entered By History   calcium carbonate 600  "mg-vitamin D 400 units (CALTRATE) 600-400 MG-UNIT per tablet Take 1 tablet by mouth 2 times daily   Yes Unknown, Entered By History   cilostazol (PLETAL) 100 MG tablet Take 100 mg by mouth 2 times daily   Yes Unknown, Entered By History   FLUoxetine (PROZAC) 20 MG capsule Take 40 mg by mouth every evening   Yes Unknown, Entered By History   gabapentin (NEURONTIN) 100 MG capsule Take 300 mg by mouth At Bedtime   Yes Unknown, Entered By History   levothyroxine (SYNTHROID/LEVOTHROID) 50 MCG tablet Take 50 mcg by mouth every evening   Yes Unknown, Entered By History   multivitamin w/minerals (THERA-VIT-M) tablet Take 1 tablet by mouth daily   Yes Unknown, Entered By History   phenazopyridine (AZO) 97.5 MG tablet Take 195 mg by mouth 3 times daily as needed for urinary tract discomfort   Yes Unknown, Entered By History   polyethylene glycol-propylene glycol PF (SYSTANE ULTRA PF) 0.4-0.3 % SOLN opthalmic solution Place 1 drop into both eyes 4 times daily as needed for dry eyes   Yes Unknown, Entered By History   vitamin D3 (CHOLECALCIFEROL) 50 mcg (2000 units) tablet Take 1 tablet by mouth every evening   Yes Unknown, Entered By History     PHYSICAL EXAM:   BP (!) 142/79 (BP Location: Right arm)   Pulse 87   Temp 97.9  F (36.6  C) (Oral)   Resp 16   Ht 1.499 m (4' 11\")   Wt 45.7 kg (100 lb 12 oz)   SpO2 96%   BMI 20.35 kg/m     GEN: Alert, NAD.  NICOLLE: AT, anicteric, OP without erythema, exudate, or ulcers.    LYMPH: No LAD noted.  HRT: RRR, no RAHUL  LUNGS: CTA  ABD: +BS, non-tender, mild to moderately distended, no rebound or guarding.  SKIN: No rash, jaundice   NEURO: MS intact       ADDITIONAL DATA:   I reviewed the patient's new clinical lab test results.   Recent Labs   Lab Test 07/18/23  0647 07/17/23  1243 07/15/23  0724 08/22/22  0722 08/21/22  1929   WBC 4.5 6.0 6.9   < > 6.6   HGB 10.5* 11.6* 11.1*   < > 13.2   MCV 94 94 96   < > 91    201 169   < > 217   INR  --   --   --   --  1.12    < > = values " in this interval not displayed.     Recent Labs   Lab Test 07/18/23  0647 07/17/23  1243 07/15/23  0724    141 139   POTASSIUM 3.5 3.2* 3.8   CHLORIDE 112* 109* 107   CO2 21* 20* 22   BUN 19.9 26.9* 24.5*   CR 0.79 0.88 1.03*   ANIONGAP 9 12 10   DEBI 8.1* 8.5* 8.5*   GLC 92 134* 95     Recent Labs   Lab Test 07/17/23  1243 07/16/23  1542 07/15/23  0724 07/14/23  1051   ALBUMIN 3.7  --  3.5 3.9   BILITOTAL 0.5  --  0.9 0.6   ALT 67*  --  81* 29   AST 50*  --  106* 29   PROTEIN  --  30*  --   --         I reviewed the patient's new imaging results.     ESOPHAGRAM DOUBLE CONTRAST 7/21/2023 9:21 AM      HISTORY: Dysphagia esophageal.  TECHNIQUE: 1 minute fluoroscopy utilized. 2 spot images.  COMPARISON: None     FINDINGS: Esophageal dysmotility with decreased secondary stripping  waves and multiple nonperistaltic tertiary waves present. There is  some stasis within the esophagus. No visible stricture, however a 13  mm barium tablet was withheld at the GE junction and does not pass  into the stomach.                                                                      IMPRESSION:  1.  Esophageal dysmotility/presbyesophagus.  2.  No visible strictures with the barium however a 13 mm tablet was  withheld at the GE junction. Findings indeterminant. Follow-up  endoscopy could be considered as clinically warranted.    EXAM: US ABDOMEN LIMITED  LOCATION: [facility]  DATE: 7/17/2023     INDICATION: elevated enzymes, pain  COMPARISON: None.  TECHNIQUE: Limited abdominal ultrasound.     FINDINGS:     GALLBLADDER: Surgically removed.     BILE DUCTS: No biliary dilatation. The common duct measures 6 mm.     LIVER: Normal parenchyma with smooth contour. No focal mass.     RIGHT KIDNEY: No hydronephrosis.     PANCREAS: The visualized portions are normal.     No ascites.                                                                      IMPRESSION:  1.  Cholecystectomy.  2.  Liver within normal  limits.

## 2023-07-22 ENCOUNTER — APPOINTMENT (OUTPATIENT)
Dept: OCCUPATIONAL THERAPY | Facility: CLINIC | Age: 83
DRG: 551 | End: 2023-07-22
Payer: COMMERCIAL

## 2023-07-22 ENCOUNTER — APPOINTMENT (OUTPATIENT)
Dept: PHYSICAL THERAPY | Facility: CLINIC | Age: 83
DRG: 551 | End: 2023-07-22
Payer: COMMERCIAL

## 2023-07-22 LAB
ANION GAP SERPL CALCULATED.3IONS-SCNC: 17 MMOL/L (ref 7–15)
BUN SERPL-MCNC: 11.7 MG/DL (ref 8–23)
CALCIUM SERPL-MCNC: 8.5 MG/DL (ref 8.8–10.2)
CHLORIDE SERPL-SCNC: 105 MMOL/L (ref 98–107)
CREAT SERPL-MCNC: 0.77 MG/DL (ref 0.51–0.95)
DEPRECATED HCO3 PLAS-SCNC: 19 MMOL/L (ref 22–29)
ERYTHROCYTE [DISTWIDTH] IN BLOOD BY AUTOMATED COUNT: 14.4 % (ref 10–15)
GFR SERPL CREATININE-BSD FRML MDRD: 76 ML/MIN/1.73M2
GLUCOSE SERPL-MCNC: 73 MG/DL (ref 70–99)
HCT VFR BLD AUTO: 32.3 % (ref 35–47)
HGB BLD-MCNC: 11 G/DL (ref 11.7–15.7)
LACTATE SERPL-SCNC: 1 MMOL/L (ref 0.7–2)
MCH RBC QN AUTO: 32.1 PG (ref 26.5–33)
MCHC RBC AUTO-ENTMCNC: 34.1 G/DL (ref 31.5–36.5)
MCV RBC AUTO: 94 FL (ref 78–100)
PLATELET # BLD AUTO: 203 10E3/UL (ref 150–450)
POTASSIUM SERPL-SCNC: 3.4 MMOL/L (ref 3.4–5.3)
RBC # BLD AUTO: 3.43 10E6/UL (ref 3.8–5.2)
SODIUM SERPL-SCNC: 141 MMOL/L (ref 136–145)
TROPONIN T SERPL HS-MCNC: 21 NG/L
WBC # BLD AUTO: 6.3 10E3/UL (ref 4–11)

## 2023-07-22 PROCEDURE — 97116 GAIT TRAINING THERAPY: CPT | Mod: GP

## 2023-07-22 PROCEDURE — 36415 COLL VENOUS BLD VENIPUNCTURE: CPT | Performed by: HOSPITALIST

## 2023-07-22 PROCEDURE — 84484 ASSAY OF TROPONIN QUANT: CPT | Performed by: HOSPITALIST

## 2023-07-22 PROCEDURE — 120N000001 HC R&B MED SURG/OB

## 2023-07-22 PROCEDURE — 85027 COMPLETE CBC AUTOMATED: CPT | Performed by: HOSPITALIST

## 2023-07-22 PROCEDURE — 97530 THERAPEUTIC ACTIVITIES: CPT | Mod: GO

## 2023-07-22 PROCEDURE — 83605 ASSAY OF LACTIC ACID: CPT | Performed by: HOSPITALIST

## 2023-07-22 PROCEDURE — 99233 SBSQ HOSP IP/OBS HIGH 50: CPT | Performed by: HOSPITALIST

## 2023-07-22 PROCEDURE — 97530 THERAPEUTIC ACTIVITIES: CPT | Mod: GP

## 2023-07-22 PROCEDURE — 97535 SELF CARE MNGMENT TRAINING: CPT | Mod: GO

## 2023-07-22 PROCEDURE — 250N000013 HC RX MED GY IP 250 OP 250 PS 637: Performed by: HOSPITALIST

## 2023-07-22 PROCEDURE — C9113 INJ PANTOPRAZOLE SODIUM, VIA: HCPCS | Mod: JZ | Performed by: INTERNAL MEDICINE

## 2023-07-22 PROCEDURE — 258N000002 HC RX IP 258 OP 250: Performed by: HOSPITALIST

## 2023-07-22 PROCEDURE — 250N000011 HC RX IP 250 OP 636: Mod: JZ | Performed by: INTERNAL MEDICINE

## 2023-07-22 PROCEDURE — 80048 BASIC METABOLIC PNL TOTAL CA: CPT | Performed by: HOSPITALIST

## 2023-07-22 RX ORDER — SODIUM CHLORIDE 450 MG/100ML
INJECTION, SOLUTION INTRAVENOUS CONTINUOUS
Status: DISCONTINUED | OUTPATIENT
Start: 2023-07-22 | End: 2023-07-24

## 2023-07-22 RX ORDER — ACETAMINOPHEN 325 MG/10.15ML
650 LIQUID ORAL EVERY 6 HOURS
Status: DISCONTINUED | OUTPATIENT
Start: 2023-07-22 | End: 2023-07-25

## 2023-07-22 RX ORDER — VANCOMYCIN HYDROCHLORIDE 50 MG/ML
125 KIT ORAL 2 TIMES DAILY
Status: DISCONTINUED | OUTPATIENT
Start: 2023-07-22 | End: 2023-07-25 | Stop reason: HOSPADM

## 2023-07-22 RX ADMIN — VANCOMYCIN HYDROCHLORIDE 125 MG: KIT at 21:05

## 2023-07-22 RX ADMIN — SODIUM CHLORIDE: 4.5 INJECTION, SOLUTION INTRAVENOUS at 19:50

## 2023-07-22 RX ADMIN — PANTOPRAZOLE SODIUM 40 MG: 40 INJECTION, POWDER, FOR SOLUTION INTRAVENOUS at 16:41

## 2023-07-22 RX ADMIN — ACETAMINOPHEN 650 MG: 325 SUSPENSION ORAL at 18:01

## 2023-07-22 ASSESSMENT — ACTIVITIES OF DAILY LIVING (ADL)
ADLS_ACUITY_SCORE: 39
ADLS_ACUITY_SCORE: 38
ADLS_ACUITY_SCORE: 41
ADLS_ACUITY_SCORE: 42
ADLS_ACUITY_SCORE: 41
ADLS_ACUITY_SCORE: 42
ADLS_ACUITY_SCORE: 41
ADLS_ACUITY_SCORE: 37
ADLS_ACUITY_SCORE: 41
ADLS_ACUITY_SCORE: 42
ADLS_ACUITY_SCORE: 38
ADLS_ACUITY_SCORE: 38

## 2023-07-22 NOTE — PLAN OF CARE
Goal Outcome Evaluation:    Pt denies any chest pain or pain when swallowing, up with 1 GB&W, CL diet, IV fluids, Tele SR, Medications changed to IV/liquid, Plan for EGD on Monday.

## 2023-07-22 NOTE — PROGRESS NOTES
[facility]    Internal Medicine Hospitalist Progress Note  07/22/2023  I evaluated patient on the above date.      Assessment & Plan New actions/orders today (07/22/2023) are underlined. All lab results in the assessment and plan were reviewed.    Leesa Jacitno is a very pleasant 83 year old female with past medical history including PAF on chronic anticoagulation; HLD; CVA history; hypothyroidism; depression/anxiety; peripheral neuropathy; osteoporosis; h/o Takotsubo cardiomyopathy; and h/o C. Diff; who presented 7/14/2023 with acute back pain after a fall.'    On initially evaluation 7/14, VSS. ECG with SR with NSTWA, no acute ischemic changes. Labs notable for BMP with bicarb 18; CBC unremarkable; LFT's normal. Head CT 7/14 negative for acute findings. '    CT T-spine 7/14 showed a recent-appearing moderate burst compression fracture deformity of the T3 vertebral body with mild retropulsion of bony fragments into the spinal canal; chronic-appearing  moderate compression fracture deformity of the T12 vertebral body; no spinal canal stenosis.    CT L-spine 7/14 showed no acute fractures; multiple areas of degenerative/chronic changes.    CT C-spine 7/14 showed no acute fractures; degenerative changes.    CT CAP 7/14 showed age indeterminate compression deformities at T3, T12, and L2; no other acute traumatic abnormality identified; a few indeterminate pulmonary nodules; mild biliary ductal prominence.    MRI T-spine 7/14 showed recent-appearing moderate burst compression fracture deformity of the T3 vertebral body again noted; chronic-appearing fracture of the T12 vertebral body again noted; bone marrow edema in the T3 vertebral body consistent with recent fracture; bone marrow edema in the T2 vertebral body possibly due to altered biomechanics at T2-T3, marrow signal otherwise normal, no other evidence for fracture or pathologic bony lesion; mixed signal intensity nodule in the  T8 vertebral body that could represent an atypical hemangioma.    Dysphagia and globus sensation  Chest pain likely secondary to esophageal spasm  Acute on chronic issue, but now severe to the point where she was unable to tolerate oral intake per her report  Was able to swallow liquids without difficulty or coughing  Daughter was concerned about anaphylaxis, but no history of that, no rash, and no airway issues  XR esophagram with dysmotility but no obvious e/o stricture. Of note, she did have a medication that appeared stuck at the GE junction and stricture was unable to be fully ruled out  RRT later that day due to chest pain likely causes by globus and esophageal spasm  - GI planning on EGD on Monday  - tolerating clears no major chest pain  - hold oral pills, transition to liquid/IV as able to reduce pill burden  - BID PPI      Traumatic (fall) moderate burst compression fracture of T3 with mild retropulsion of bony fragments into the spinal canal.  * Initial presentation as above. Neurosurgery consulted on admit.  * Started on multiple pain meds including lidocaine patch, PRN oxycodone, PRN cyclobenzaprine.   * Neurosurgery recommended TLSO.   * Therapies recommended TCU.  - Continue TLSO when out of bed.  - Continue lifting restriction between 5 and 10 pounds.  - Continue lidocaine patch 4% q24h; PRN acetaminophen 650 mg q6h; PRN cyclobenzaprine 10 mg q8h; PRN oxycodone 2.5-5 mg q6h; PRN IV hydromorphone 0.2-0.3 mg q4h; minimize opioids as able.  - Continue PT  - Follow-up with Neurosurgery in 6 weeks with x-ray prior.    E. Coli UTI.  Gross hematuria, suspect related to above with concomitant anticoagulation.  Severe bladder pain spasms/dysuria, suspect chronic component and worsened by UTI.   History of recurrent UTI.  * Managed by primary doctor. Generally prefers to avoid antibiotics, due to risk of colitis. Regularly uses over-the-counter AZO (phenazopyridine).   * On 7/16, noted with gross hematuria,  dysuria. UA grossly abnormal. Started on ceftriaxone. UC growing E. Coli.   * On 7/17, pt c/o severe episodes of bladder pain/dysuria. She had been taking phenazopyridine OTC with good effect for ~1 year, usually about 1x/week. Pt and daughter were upset that this had been stopped. Discussed with them that phenazopyridine was not recommended with pt's renal function (normal cr, but crcl in 20's-30's). Discussed risks with using phenazopyridine with decreased cr clearance and potential side effects; given no adverse effects with once a month usage for the last year, pt seemed to have tolerated; as such, ordered phenazopyridine 100 mg x1 with pt and family aware of the potential risks.  * bladder spasms slowly resolved  - stop oxybutynin  - completed course of ceftriaxone  - Continue to monitor hematuria.    History of C. difficile colitis.  * Started on PO vanco prophylaxis 7/17.  - Continue PO vanco prophylaxis BID - stop after 7/27.    Acute kidney injury, suspect prerenal.  * Creatinine 0.8-0.9 at baseline.   * Cr 1.03 on 7/15.   * Pt given IVF's 7/17.  Recent Labs   Lab 07/22/23  1202 07/18/23  0647 07/17/23  1243   CR 0.77 0.79 0.88   - Continue to monitor BMP - repeat in am.  - Avoid nephrotoxic medications.    Elevated transaminases, unclear etiology.  * LFT's normal on admit. CT CAP 7/14 showed no acute hepatic abnormality, gallbladder not seen, mild intrahepatic biliary ductal prominence.   * Abdominal US 7/17 negative.  Recent Labs   Lab 07/22/23  1202 07/18/23  0647 07/17/23  1243 07/16/23  1542   ALBUMIN  --   --  3.7  --    BILITOTAL  --   --  0.5  --    ALT  --   --  67*  --    AST  --   --  50*  --    ALKPHOS  --   --  134*  --    PROTEIN  --   --   --  30*   CR 0.77 0.79 0.88  --    - Continue to monitor LFT's.    Indeterminate pulmonary nodules.  * Initial presentation and imaging as above. CT CAP 7/14 also showed a few indeterminate pulmonary nodules.  - Follow-up with PCP for follow-up imaging if  indicated.    PAF on chronic AC.  CVA (2019) history felt to be related to intracranial atherosclerosis.  Cerebrovascular disease.  * H/o stroke in 2019 thought secondary to intracranial atherosclerosis with left PRISCA cerebral infarction. In 8/2022, admitted with left arm and leg weakness and limb shaking of LLE; imaging negative for acute stroke. Head CTA 8/2022 showed bilateral M2 segments demonstrated multiple stenoses ranging from mild to severe; bilateral A2/A3 segments with history multiple stenoses ranging from mild to severe; right supraclinoid ICA demonstrated a small 2 mm inferior outpouching may reflect infundibulum with poorly visualized apical artery or small aneurysm. Started on cilostazol 8/2023.  - Continue apixaban (hold for EGD), cilostazol, atorvastatin.    Depression/anxiety.  - Continue fluoxetine.    Peripheral neuropathy.  - Continue gabapentin.    Takotsubo cardiomyopathy in 2015, resolved.   - Noted.      Clinically Significant Risk Factors                          # Severe Malnutrition: based on nutrition assessment           COVID-19 testing.  COVID-19 PCR Results        10/7/2021    17:39 8/23/2022    12:12   COVID-19 PCR Results   COVID-19 Virus by PCR (External Result) Negative        SARS CoV2 PCR  Negative     Details          This result is from an external source.         COVID-19 Antibody Results, Testing for Immunity         No data to display                Diet: Snacks/Supplements Adult: Ensure Enlive; Between Meals  Clear Liquid Diet    Prophylaxis: PCD's, ambulation. On apixaban.  Limon Catheter: Not present  Lines: None     Code Status: No CPR- Do NOT Intubate    Disposition Plan   Expected discharge: 2d recommended to home with 24h supervision vs TCU pending improvement in bladder pain/spasms, family arrangement of 24h cares vs TCU bed availability if pt decides.  Entered: Koby Jones,  07/22/2023, 4:00 PM     Communication.  - I d/w pt's greg russo  "7/20      Interval History   Endorses worse dysphagia, with acute inability to swallow water this afternoon. She felt sob and that water was stuck. Discussed with Dr. Ayers who will do EGD when she is off the eliquis    -Data reviewed today: I reviewed all new labs and imaging over the last 24 hours. I personally reviewed no images or EKG's today.    Physical Exam    , Blood pressure (!) 141/61, pulse 75, temperature 98.2  F (36.8  C), temperature source Oral, resp. rate 18, height 1.499 m (4' 11\"), weight 45.7 kg (100 lb 12 oz), SpO2 97 %. O2 Device: None (Room air) Oxygen Delivery: 1 LPM  Vitals:    07/14/23 1700   Weight: 45.7 kg (100 lb 12 oz)     Vital Signs with Ranges  Temp:  [96.6  F (35.9  C)-98.2  F (36.8  C)] 98.2  F (36.8  C)  Pulse:  [] 75  Resp:  [16-26] 18  BP: (132-166)/() 141/61  SpO2:  [93 %-98 %] 97 %  Patient Vitals for the past 24 hrs:   BP Temp Temp src Pulse Resp SpO2   07/22/23 1537 (!) 141/61 98.2  F (36.8  C) Oral 75 18 97 %   07/22/23 0746 134/72 (!) 96.6  F (35.9  C) Oral 84 17 97 %   07/22/23 0135 134/67 -- -- 85 16 93 %   07/21/23 2028 135/61 97.2  F (36.2  C) Oral 88 16 98 %   07/21/23 1800 (!) 142/80 -- -- 99 18 --   07/21/23 1740 (!) 145/69 -- -- 111 20 --   07/21/23 1730 (!) 132/100 -- -- 101 22 --   07/21/23 1715 (!) 153/81 -- -- 98 26 --   07/21/23 1705 (!) 166/80 -- -- 100 24 97 %     I/O's Last 24 hours  No intake/output data recorded.    Constitutional: Awake, alert, oriented, pleasant.  Respiratory: Diminished in bases. No crackles or wheezes.  Cardiovascular: RRR, no m/r/g.  GI: Soft, nt, nd, +BS.  Skin/Integumen:   Other:        Data    Labs reviewed.  Recent Labs   Lab 07/22/23  1202 07/21/23  1747 07/18/23  0647 07/17/23  1243   WBC 6.3 7.1 4.5 6.0   HGB 11.0* 11.2* 10.5* 11.6*   MCV 94 94 94 94    222 189 201     --  142 141   POTASSIUM 3.4  --  3.5 3.2*   CHLORIDE 105  --  112* 109*   CO2 19*  --  21* 20*   BUN 11.7  --  19.9 26.9*   CR 0.77  --  " 0.79 0.88   ANIONGAP 17*  --  9 12   DEBI 8.5*  --  8.1* 8.5*   GLC 73  --  92 134*   ALBUMIN  --   --   --  3.7   PROTTOTAL  --   --   --  6.1*   BILITOTAL  --   --   --  0.5   ALKPHOS  --   --   --  134*   ALT  --   --   --  67*   AST  --   --   --  50*     Recent Labs   Lab Test 07/22/23  0214 07/21/23  2305 07/21/23  1747   TROPONIN T HIGH SENSITIVITY 21* 20* 14     Recent Labs   Lab 07/22/23  1202 07/18/23  0647 07/17/23  1243   GLC 73 92 134*      Recent Labs   Lab Test 08/21/22  1929   A1C 5.3        No results for input(s): INR, [id number] in the last 168 hours.  Recent Labs   Lab 07/22/23  1202 07/21/23  1747 07/18/23  0647 07/17/23  1243   WBC 6.3 7.1 4.5 6.0   LACT  --  1.1  --   --        MICRO:  CULTURES (INCLUDING BLOOD AND URINE):  Recent Labs   Lab 07/16/23  1542   CULTURE >100,000 CFU/mL Escherichia coli*       Recent Results (from the past 24 hour(s))   XR Chest Port 1 View    Narrative    EXAM: XR CHEST PORT 1 VIEW  LOCATION: [facility]  DATE: 7/21/2023    INDICATION: chest pain  COMPARISON: None.      Impression    IMPRESSION: Negative chest.   XR Abdomen 2 Views    Narrative    EXAM: XR ABDOMEN 2 VIEWS  LOCATION: [facility]  DATE: 7/21/2023    INDICATION: Abdominal distention.    COMPARISON: None.      Impression    IMPRESSION: No definite free air on lateral decubitus views. Some air-filled colon loops are noted, no dilated small bowel to suggest small bowel obstruction.         Medications   All medications were reviewed.    Infusions:    Scheduled Medications:    acetaminophen  650 mg Oral Q6H     [Held by provider] acetaminophen  650 mg Oral Q6H     [Held by provider] apixaban ANTICOAGULANT  2.5 mg Oral BID     [Held by provider] atorvastatin  20 mg Oral Daily     [Held by provider] cilostazol  100 mg Oral BID     [Held by provider] FLUoxetine  40 mg Oral QPM     [Held by provider] gabapentin  300 mg Oral At Bedtime     [Held by provider]  lactobacillus rhamnosus (GG)  1 capsule Oral BID     lidocaine  1 patch Transdermal Q24h     [Held by provider] multivitamin w/minerals  1 tablet Oral Daily     pantoprazole  40 mg Intravenous BID AC     sodium chloride (PF)  3 mL Intracatheter Q8H     vancomycin  125 mg Oral BID     [Held by provider] vitamin D3  50 mcg Oral QPM     PRN Medications:  cyclobenzaprine, HYDROmorphone, lidocaine 4%, lidocaine (buffered or not buffered), melatonin, naloxone **OR** naloxone **OR** naloxone **OR** naloxone, nitroGLYcerin, ondansetron, oxyCODONE IR, polyethylene glycol-propylene glycol PF, senna-docusate **OR** senna-docusate, sodium chloride (PF)

## 2023-07-22 NOTE — PROGRESS NOTES
[facility]  Gastroenterology Progress Note        Juan Ramon Mcqueen MD    Patient Name: Leesa Jacinto MRN# [id number]   YOB: 1940 Age: 83 year old      Date of Admission:  7/14/2023  Today's Date: 07/22/2023        Interval History:        Chart reviewed.  Swallowing improved 20%.  Able to swallow clear liquids although still feels some chest tightness.  No nausea or vomiting.  No GI bleeding reported.  Eliquis on hold for EGD on Monday.  MAR reviewed.  Currently not on a PPI.  No other complaints from a GI standpoint.         Assessment and Plan:        Impression:  1.  Dysphagia, modestly improved, able to tolerate liquid diet.  Differential diagnosis includes presbyesophagus plus minus esophageal inflammation from GERD versus esophageal stricture.  Esophagram findings may be caused by either.  Mass or malignancy less likely.  No evidence for ongoing tee esophageal obstruction.  2.  Chronic multiple medical problems including anticoagulation with Eliquis currently now on hold.    Recommendations:  -Start pantoprazole 40 mg IV twice daily.  Order placed.  -Continue clear liquid diet.  -Will evaluate again in the morning.  If significantly improved could try puréed diet.  -EGD on Monday with MAC IV for definitive diagnosis and possible therapeutic intervention if indicated based on findings.  -Further recommendations pending above and course.  -Supportive care for concomitant medical problems per admitting team.  -We will follow.  Please call any further questions.                   Physical Exam:        Vital Signs with Ranges  Temp:  [96.6  F (35.9  C)-97.5  F (36.4  C)] 96.6  F (35.9  C)  Pulse:  [] 84  Resp:  [16-26] 17  BP: (132-166)/() 134/72  SpO2:  [93 %-98 %] 97 %  I/O's Last 24 hours  No intake/output data recorded.    Constitutional:  Alert and no distress.   HEENT: PERRL, EOMI, sclera nonicteric, conjunctiva pink and moist.  NECK: Supple,  nontender. No lymphadenopathy, JVD or bruits noted.  PULMONARY: Clear to auscultation bilaterally.  CARDIOVASCULAR: S1, S2, no S3, no S4, no murmur, regular rate and rhythm  ABDOMEN: Soft, nontender, nondistended, no tympany, no organomegaly, no fullness, guarding or rebound are noted.   NEURO: Alert and oriented to place, time and person. Neurological exam grossly nonfocal, CN II through XII are grossly intact. Detailed neurological exam is not performed.  EXT: No cyanosis, clubbing or edema.       Medications:          acetaminophen  650 mg Oral Q6H     [Held by provider] apixaban ANTICOAGULANT  2.5 mg Oral BID     atorvastatin  20 mg Oral Daily     cilostazol  100 mg Oral BID     FLUoxetine  40 mg Oral QPM     gabapentin  300 mg Oral At Bedtime     lactobacillus rhamnosus (GG)  1 capsule Oral BID     levothyroxine  50 mcg Oral QPM     lidocaine  1 patch Transdermal Q24h     multivitamin w/minerals  1 tablet Oral Daily     oxybutynin  5 mg Oral TID     sodium chloride (PF)  3 mL Intracatheter Q8H     vancomycin  125 mg Oral BID     vitamin D3  50 mcg Oral QPM     PRN Meds: cyclobenzaprine, HYDROmorphone, lidocaine 4%, lidocaine (buffered or not buffered), melatonin, naloxone **OR** naloxone **OR** naloxone **OR** naloxone, nitroGLYcerin, ondansetron, oxyCODONE IR, polyethylene glycol-propylene glycol PF, senna-docusate **OR** senna-docusate, sodium chloride (PF)         Data:      All new lab and imaging data was reviewed.  .  Recent Labs   Lab Test 07/21/23  1747 07/18/23  0647 07/17/23  1243 08/22/22  0722 08/21/22  1929   WBC 7.1 4.5 6.0   < > 6.6   HGB 11.2* 10.5* 11.6*   < > 13.2   MCV 94 94 94   < > 91    189 201   < > 217   INR  --   --   --   --  1.12    < > = values in this interval not displayed.     Recent Labs   Lab Test 07/18/23  0647 07/17/23  1243 07/15/23  0724    141 139   POTASSIUM 3.5 3.2* 3.8   CHLORIDE 112* 109* 107   CO2 21* 20* 22   BUN 19.9 26.9* 24.5*   CR 0.79 0.88 1.03*    ANIONGAP 9 12 10     Recent Labs   Lab Test 07/17/23  1243 07/16/23  1542 07/15/23  0724 07/14/23  1051   ALBUMIN 3.7  --  3.5 3.9   BILITOTAL 0.5  --  0.9 0.6   ALT 67*  --  81* 29   AST 50*  --  106* 29   ALKPHOS 134*  --  92 71   PROTEIN  --  30*  --   --             Juan Ramon Mcqueen MD, FACG  [facility]  [phone]

## 2023-07-22 NOTE — CODE/RAPID RESPONSE
[facility]    RRT Note  7/21/2023   Time Called: 5:00 PM    Code Status: No CPR- Do NOT Intubate    I was called to evaluate Leesa Jacinto, who is a 83 year old female with a past medical history significant for PAF on chronic anticoagulation, HLD, CVA, hypothyroidism, depression, anxiety, peripheral neuropathy, osteoporosis, history of Takotsubo cardiomyopathy, and history of C. difficile who was admitted on 7/14/2023 for acute back pain after fall, found to have T3 compression fracture.    Assessment & Plan     Acute chest pain suspect 2/2 esophageal spasm vs. GERD vs. ACS vs. Anxiety vs. MSK  Upon arrival patient is ill-appearing, in acute distress.  Patient is dyspneic, anxious.  She has a sense of impending doom.  Patient reports 10/10 squeezing nonexertional chest pain which is nonradiating.  Patient endorses shortness of breath and nausea.  She denies lightheadedness, dizziness, palpitations, fever, or chills.  On exam skin is warm and dry.  Lungs are clear to auscultation.  RRR.  No murmur, rub, or gallop.  Chest pain reproducible in mid sternum and epigastric area no peripheral edema.  No unilateral leg swelling.  Abdomen soft, nontender, distended.    Patient had an esophagram today which does not show any stricture, however the barium pill does get stuck at the GE junction.  Patient was evaluated by [facility] and recommend upper endoscopy, however would like patient to stop Eliquis for 2 to 3 days prior to intervention.    After receiving 0.5 mg IV Dilaudid chest pain decreased to 7/10.  After reviewing EKG and appreciating ST changes in inferior and lateral leads 0.4 mg sublingual nitroglycerin given.  Patient did not have immediate response to nitroglycerin.  Approximately 10 minutes later patient's pain decreased to 5/10.    Differentials considered include PE, less likely is atypical chest pain presentation; no hypoxia or tachycardia.  No unilateral leg swelling.   Aortic dissection considered however patient had CT Chest abdomen pelvis this admission which was not remarkable for any aortic abnormalities.  Considered esophageal rupture, however patient hemodynamically stable, and lactic acid normal.  No crepitus on exam.      INTERVENTIONS:  - 12-lead EKG  - CXR- negative for pneumothorax, pneumonia, or pulmonary edema  - 0.5 mg IV Dilaudid  - 0.4 mg sublingual nitroglycerin  - keep NPO    At the end of evaluation patient is rating her pain a 2/10, which is the baseline discomfort patient has been experiencing with her dysphagia. She remains hemodynamically stable. Patient will remain on station 66.  Discussed and defer further cares to hospitalist Dr. Jones.      Magdiel Cardozo NP  [facility]  Securely message with the Vocera Web Console (learn more here)  Text page via [employer] Paging/Directory          Physical Exam   Vital Signs with Ranges:  Temp:  [97.5  F (36.4  C)-98.4  F (36.9  C)] 97.5  F (36.4  C)  Pulse:  [] 99  Resp:  [16-26] 18  BP: (132-166)/() 142/80  SpO2:  [96 %-98 %] 97 %  I/O last 3 completed shifts:  In: 120 [P.O.:120]  Out: 300 [Urine:300]      Physical Exam   General: Ill-appearing, in acute distress.  A&O x 3.  Skin:  Warm, dry. No rashes or lesions on exposed skin.  HEENT:  Normocephalic, atraumatic.  Neck:  Supple.  Chest:  Breath sounds CTA.  Tachypneic without sternocleidomastoid accessory muscle use.  Cardiovascular:  RRR, no rub or murmur. No peripheral edema.  Midsternal and epigastric chest pain reproducible.  Abdomen:  Soft, non-tender, distended.  Musculoskeletal:  Moves all four extremities.   Neurological: No focal neurological deficits.  Psychiatric: Anxious.    Data     EKG:  Interpreted by Magdiel Cardozo NP  Time reviewed: 7:31 AM  Symptoms at time of EKG: Chest pain   Rhythm: normal sinus   Rate: 98  Axis: Normal  Ectopy: none  Conduction: normal  ST Segments/ T Waves: Non-specific ST-T wave  changes  Q Waves: none  Comparison to prior: non-specific t-wave abnormality improved    Clinical Impression: non-specific EKG    IMAGING: (X-ray/CT/MRI)   Recent Results (from the past 24 hour(s))   XR Esophagram    Narrative    ESOPHAGRAM DOUBLE CONTRAST 7/21/2023 9:21 AM     HISTORY: Dysphagia esophageal.  TECHNIQUE: 1 minute fluoroscopy utilized. 2 spot images.  COMPARISON: None    FINDINGS: Esophageal dysmotility with decreased secondary stripping  waves and multiple nonperistaltic tertiary waves present. There is  some stasis within the esophagus. No visible stricture, however a 13  mm barium tablet was withheld at the GE junction and does not pass  into the stomach.      Impression    IMPRESSION:  1.  Esophageal dysmotility/presbyesophagus.  2.  No visible strictures with the barium however a 13 mm tablet was  withheld at the GE junction. Findings indeterminant. Follow-up  endoscopy could be considered as clinically warranted.    MARYANN MARIA MD         SYSTEM ID:  [id number]   XR Chest Port 1 View    Narrative    EXAM: XR CHEST PORT 1 VIEW  LOCATION: [facility]  DATE: 7/21/2023    INDICATION: chest pain  COMPARISON: None.      Impression    IMPRESSION: Negative chest.   XR Abdomen 2 Views    Narrative    EXAM: XR ABDOMEN 2 VIEWS  LOCATION: [facility]  DATE: 7/21/2023    INDICATION: Abdominal distention.    COMPARISON: None.      Impression    IMPRESSION: No definite free air on lateral decubitus views. Some air-filled colon loops are noted, no dilated small bowel to suggest small bowel obstruction.         CBC with Diff:  Recent Labs   Lab Test 07/21/23  1747 08/22/22  0722 08/21/22  1929   WBC 7.1   < > 6.6   HGB 11.2*   < > 13.2   MCV 94   < > 91      < > 217   INR  --   --  1.12    < > = values in this interval not displayed.      No results found for: RETICABSCT  No results found for: RETP    Comprehensive Metabolic Panel:  Recent Labs   Lab  07/18/23  0647 07/17/23  1243    141   POTASSIUM 3.5 3.2*   CHLORIDE 112* 109*   CO2 21* 20*   ANIONGAP 9 12   GLC 92 134*   BUN 19.9 26.9*   CR 0.79 0.88   GFRESTIMATED 73 64   DEBI 8.1* 8.5*   MAG 2.1  --    PROTTOTAL  --  6.1*   ALBUMIN  --  3.7   BILITOTAL  --  0.5   ALKPHOS  --  134*   AST  --  50*   ALT  --  67*         Time Spent on this Encounter   CRITICAL CARE TIME  I spent 45 minutes (5:05 PM - 5:50 PM) of critical care time on the unit/floor managing the care of Leesa Jacinto. Upon evaluation, this patient had a high probability of imminent or life-threatening deterioration due to acute chest pain which required my direct attention, intervention, and personal management. 100% of my time was spent at the bedside counseling the patient and/or coordinating care regarding services listed in this note.

## 2023-07-22 NOTE — PLAN OF CARE
Goal Outcome Evaluation:      Pt. A & O x 4 , forgetful. Vitals stable on 1 lpm O2 overnight. Pain managed with PRN IV dilaudid. Denies chest pain, sob or trouble breathing. Difficulty of swallowing. NPO after midnight. Incontinent of bowels/bladder. Active BS, had BM  this shift. Adequate urin output via purewick. 2 x L PIV, SL. Assist of one w/ GBW. Back brace worn when getting out of bed. Abnormal labs/trends- troponin trending up. Plan- start clear liquids and advance as tolerated.

## 2023-07-23 ENCOUNTER — APPOINTMENT (OUTPATIENT)
Dept: OCCUPATIONAL THERAPY | Facility: CLINIC | Age: 83
DRG: 551 | End: 2023-07-23
Payer: COMMERCIAL

## 2023-07-23 ENCOUNTER — APPOINTMENT (OUTPATIENT)
Dept: PHYSICAL THERAPY | Facility: CLINIC | Age: 83
DRG: 551 | End: 2023-07-23
Payer: COMMERCIAL

## 2023-07-23 LAB — C DIFF TOX B STL QL: NEGATIVE

## 2023-07-23 PROCEDURE — 120N000001 HC R&B MED SURG/OB

## 2023-07-23 PROCEDURE — 258N000002 HC RX IP 258 OP 250: Performed by: HOSPITALIST

## 2023-07-23 PROCEDURE — 97530 THERAPEUTIC ACTIVITIES: CPT | Mod: GO

## 2023-07-23 PROCEDURE — 99232 SBSQ HOSP IP/OBS MODERATE 35: CPT | Performed by: HOSPITALIST

## 2023-07-23 PROCEDURE — 97535 SELF CARE MNGMENT TRAINING: CPT | Mod: GO

## 2023-07-23 PROCEDURE — 87493 C DIFF AMPLIFIED PROBE: CPT | Performed by: HOSPITALIST

## 2023-07-23 PROCEDURE — 250N000011 HC RX IP 250 OP 636: Mod: JZ | Performed by: HOSPITALIST

## 2023-07-23 PROCEDURE — C9113 INJ PANTOPRAZOLE SODIUM, VIA: HCPCS | Mod: JZ | Performed by: INTERNAL MEDICINE

## 2023-07-23 PROCEDURE — 97530 THERAPEUTIC ACTIVITIES: CPT | Mod: GP

## 2023-07-23 PROCEDURE — 250N000013 HC RX MED GY IP 250 OP 250 PS 637: Performed by: HOSPITALIST

## 2023-07-23 PROCEDURE — 250N000011 HC RX IP 250 OP 636: Mod: JZ | Performed by: INTERNAL MEDICINE

## 2023-07-23 RX ADMIN — PANTOPRAZOLE SODIUM 40 MG: 40 INJECTION, POWDER, FOR SOLUTION INTRAVENOUS at 06:22

## 2023-07-23 RX ADMIN — PANTOPRAZOLE SODIUM 40 MG: 40 INJECTION, POWDER, FOR SOLUTION INTRAVENOUS at 16:14

## 2023-07-23 RX ADMIN — HYDROMORPHONE HYDROCHLORIDE 0.2 MG: 0.2 INJECTION, SOLUTION INTRAMUSCULAR; INTRAVENOUS; SUBCUTANEOUS at 22:23

## 2023-07-23 RX ADMIN — SODIUM CHLORIDE: 4.5 INJECTION, SOLUTION INTRAVENOUS at 16:14

## 2023-07-23 RX ADMIN — VANCOMYCIN HYDROCHLORIDE 125 MG: KIT at 08:46

## 2023-07-23 RX ADMIN — VANCOMYCIN HYDROCHLORIDE 125 MG: KIT at 21:08

## 2023-07-23 RX ADMIN — HYDROMORPHONE HYDROCHLORIDE 0.3 MG: 0.2 INJECTION, SOLUTION INTRAMUSCULAR; INTRAVENOUS; SUBCUTANEOUS at 00:10

## 2023-07-23 ASSESSMENT — ACTIVITIES OF DAILY LIVING (ADL)
ADLS_ACUITY_SCORE: 36
ADLS_ACUITY_SCORE: 32
ADLS_ACUITY_SCORE: 36
ADLS_ACUITY_SCORE: 32
ADLS_ACUITY_SCORE: 36
ADLS_ACUITY_SCORE: 32
ADLS_ACUITY_SCORE: 36

## 2023-07-23 NOTE — PLAN OF CARE
DATE & TIME: 7/23/23   Cognitive Concerns/ Orientation : A/O x4   BEHAVIOR & AGGRESSION TOOL COLOR: Green  CIWA SCORE: N/A       ABNL VS/O2: VSS on RA  MOBILITY: Ax1 with GB and walker with back brace except when up to the bathroom Pt does not need brace  PAIN MANAGMENT:declined lidocaine patches and liquid tylenol  DIET: Clear liquids  BOWEL/BLADDER: incontinent of stool  ABNL LAB/BG: NA  DRAIN/DEVICES: PIV 1/2 NS 50 ml/hr  TELEMETRY RHYTHM: SR  SKIN: Intact  TESTS/PROCEDURES: Plan for EGD tomorrow 7/24/23, R/O C-Diff  D/C DATE: Discharge pending to TCU vs back to apartmentt. Pt states looking into home health.

## 2023-07-23 NOTE — PROGRESS NOTES
[facility]  Gastroenterology Progress Note        Juan Ramon Mcqueen MD    Patient Name: Leesa Jacinto MRN# [id number]   YOB: 1940 Age: 83 year old      Date of Admission:  7/14/2023  Today's Date: 07/23/2023        Interval History:        Tolerating clear liquids without any symptoms on PPIs started yesterday.  Plan for EGD tomorrow.  Offers no complaints.         Assessment and Plan:        Impression:  1.  Dysphagia, seemingly resolved on clear liquids.  Differential diagnosis includes presbyesophagus plus minus esophageal inflammation from GERD versus esophageal stricture.  Esophagram findings may be caused by either.  Mass or malignancy less likely.  No evidence for ongoing tee esophageal obstruction.  2.  Chronic multiple medical problems including anticoagulation with Eliquis currently now on hold.    Recommendations:  -Continue IV pantoprazole twice daily.  -Advance to puréed diet.  Patient would like clear liquids for dinner to ensure her EGD is successful.  -EGD on Monday with MAC IV for definitive diagnosis and possible therapeutic intervention if indicated based on findings.  -Further recommendations pending above and course.  -Supportive care for concomitant medical problems per admitting team.  -Incoming [facility] team will assume care in the morning. Please call with any questions in the interim.                   Physical Exam:        Vital Signs with Ranges  Temp:  [97.7  F (36.5  C)-98.3  F (36.8  C)] 97.7  F (36.5  C)  Pulse:  [75-84] 82  Resp:  [17-18] 17  BP: (128-141)/(61-82) 138/82  SpO2:  [97 %-98 %] 98 %  I/O's Last 24 hours  I/O last 3 completed shifts:  In: 1514 [I.V.:1514]  Out: 150 [Urine:150]    Constitutional:  Alert and no distress.   HEENT: PERRL, EOMI, sclera nonicteric, conjunctiva pink and moist.  NECK: Supple, nontender. No lymphadenopathy, JVD or bruits noted.  PULMONARY: Clear to auscultation  bilaterally.  CARDIOVASCULAR: S1, S2, no S3, no S4, no murmur, regular rate and rhythm  ABDOMEN: Soft, nontender, nondistended, no tympany, no organomegaly, no fullness, guarding or rebound are noted.   NEURO: Alert and oriented to place, time and person. Neurological exam grossly nonfocal, CN II through XII are grossly intact. Detailed neurological exam is not performed.  EXT: No cyanosis, clubbing or edema.       Medications:          acetaminophen  650 mg Oral Q6H     [Held by provider] acetaminophen  650 mg Oral Q6H     [Held by provider] apixaban ANTICOAGULANT  2.5 mg Oral BID     [Held by provider] atorvastatin  20 mg Oral Daily     [Held by provider] cilostazol  100 mg Oral BID     [Held by provider] FLUoxetine  40 mg Oral QPM     [Held by provider] gabapentin  300 mg Oral At Bedtime     [Held by provider] lactobacillus rhamnosus (GG)  1 capsule Oral BID     lidocaine  1 patch Transdermal Q24h     [Held by provider] multivitamin w/minerals  1 tablet Oral Daily     pantoprazole  40 mg Intravenous BID AC     sodium chloride (PF)  3 mL Intracatheter Q8H     vancomycin  125 mg Oral BID     [Held by provider] vitamin D3  50 mcg Oral QPM     PRN Meds: cyclobenzaprine, HYDROmorphone, lidocaine 4%, lidocaine (buffered or not buffered), melatonin, naloxone **OR** naloxone **OR** naloxone **OR** naloxone, nitroGLYcerin, ondansetron, oxyCODONE IR, polyethylene glycol-propylene glycol PF, senna-docusate **OR** senna-docusate, sodium chloride (PF)         Data:      All new lab and imaging data was reviewed.  .  Recent Labs   Lab Test 07/22/23  1202 07/21/23  1747 07/18/23  0647 08/22/22  0722 08/21/22  1929   WBC 6.3 7.1 4.5   < > 6.6   HGB 11.0* 11.2* 10.5*   < > 13.2   MCV 94 94 94   < > 91    222 189   < > 217   INR  --   --   --   --  1.12    < > = values in this interval not displayed.     Recent Labs   Lab Test 07/22/23  1202 07/18/23  0647 07/17/23  1243    142 141   POTASSIUM 3.4 3.5 3.2*   CHLORIDE  105 112* 109*   CO2 19* 21* 20*   BUN 11.7 19.9 26.9*   CR 0.77 0.79 0.88   ANIONGAP 17* 9 12     Recent Labs   Lab Test 07/17/23  1243 07/16/23  1542 07/15/23  0724 07/14/23  1051   ALBUMIN 3.7  --  3.5 3.9   BILITOTAL 0.5  --  0.9 0.6   ALT 67*  --  81* 29   AST 50*  --  106* 29   ALKPHOS 134*  --  92 71   PROTEIN  --  30*  --   --             Juan Ramon Mcqueen MD, FACG  [facility]  [phone]

## 2023-07-23 NOTE — PLAN OF CARE
Physical Therapy Discharge Summary    Reason for therapy discharge:    No further expectations of functional progress during acute stay, pt anticipated to continue to do well with continue gait with nsg.    Progress towards therapy goal(s). See goals on Care Plan in [facility] electronic health record for goal details.  Goals partially met.  Barriers to achieving goals:   continues to require intermittent assist/cues on brace don/doffing and bed mobility.    Therapy recommendation(s):    Continued therapy is recommended.  Rationale/Recommendations:  Pt is appropriate for HHPT at discharge to progress strength and balance to progress toward PLOF and reduce falls risk. Pt would require assist of 1 and significant effort to leave home and is appropriate for home services.

## 2023-07-23 NOTE — PROGRESS NOTES
[facility]    Internal Medicine Hospitalist Progress Note  07/23/2023  I evaluated patient on the above date.      Assessment & Plan New actions/orders today (07/23/2023) are underlined. All lab results in the assessment and plan were reviewed.    Leesa Jacinto is a very pleasant 83 year old female with past medical history including PAF on chronic anticoagulation; HLD; CVA history; hypothyroidism; depression/anxiety; peripheral neuropathy; osteoporosis; h/o Takotsubo cardiomyopathy; and h/o C. Diff; who presented 7/14/2023 with acute back pain after a fall.'    On initially evaluation 7/14, VSS. ECG with SR with NSTWA, no acute ischemic changes. Labs notable for BMP with bicarb 18; CBC unremarkable; LFT's normal. Head CT 7/14 negative for acute findings. '    CT T-spine 7/14 showed a recent-appearing moderate burst compression fracture deformity of the T3 vertebral body with mild retropulsion of bony fragments into the spinal canal; chronic-appearing  moderate compression fracture deformity of the T12 vertebral body; no spinal canal stenosis.    CT L-spine 7/14 showed no acute fractures; multiple areas of degenerative/chronic changes.    CT C-spine 7/14 showed no acute fractures; degenerative changes.    CT CAP 7/14 showed age indeterminate compression deformities at T3, T12, and L2; no other acute traumatic abnormality identified; a few indeterminate pulmonary nodules; mild biliary ductal prominence.    MRI T-spine 7/14 showed recent-appearing moderate burst compression fracture deformity of the T3 vertebral body again noted; chronic-appearing fracture of the T12 vertebral body again noted; bone marrow edema in the T3 vertebral body consistent with recent fracture; bone marrow edema in the T2 vertebral body possibly due to altered biomechanics at T2-T3, marrow signal otherwise normal, no other evidence for fracture or pathologic bony lesion; mixed signal intensity nodule in the  T8 vertebral body that could represent an atypical hemangioma.    Dysphagia and globus sensation  Chest pain likely secondary to esophageal spasm  Acute on chronic issue, but now severe to the point where she was unable to tolerate oral intake per her report  Was able to swallow liquids without difficulty or coughing  Daughter was concerned about anaphylaxis, but no history of that, no rash, and no airway issues  XR esophagram with dysmotility but no obvious e/o stricture. Of note, she did have a medication that appeared stuck at the GE junction and stricture was unable to be fully ruled out  RRT later that day due to chest pain likely causes by globus and esophageal spasm  - GI planning on EGD on Monday  - tolerating clears no major chest pain  - patient wants to continue holding oral pills given her dysphagia. Did switch a majority to IV, but still the holding the lipitor, eliquis, prozac, cilostazol, and MVI  - BID PPI      Traumatic (fall) moderate burst compression fracture of T3 with mild retropulsion of bony fragments into the spinal canal.  * Initial presentation as above. Neurosurgery consulted on admit.  * Started on multiple pain meds including lidocaine patch, PRN oxycodone, PRN cyclobenzaprine.   * Neurosurgery recommended TLSO.   * Therapies recommended TCU.  - Continue TLSO when out of bed.  - Continue lifting restriction between 5 and 10 pounds.  - Continue lidocaine patch 4% q24h; PRN acetaminophen 650 mg q6h; PRN cyclobenzaprine 10 mg q8h; PRN oxycodone 2.5-5 mg q6h; PRN IV hydromorphone 0.2-0.3 mg q4h; minimize opioids as able.  - Continue PT  - Follow-up with Neurosurgery in 6 weeks with x-ray prior.    E. Coli UTI.  Gross hematuria, suspect related to above with concomitant anticoagulation.  Severe bladder pain spasms/dysuria, suspect chronic component and worsened by UTI.   History of recurrent UTI.  * Managed by primary doctor. Generally prefers to avoid antibiotics, due to risk of colitis.  Regularly uses over-the-counter AZO (phenazopyridine).   * On 7/16, noted with gross hematuria, dysuria. UA grossly abnormal. Started on ceftriaxone. UC growing E. Coli.   * On 7/17, pt c/o severe episodes of bladder pain/dysuria. She had been taking phenazopyridine OTC with good effect for ~1 year, usually about 1x/week. Pt and daughter were upset that this had been stopped. Discussed with them that phenazopyridine was not recommended with pt's renal function (normal cr, but crcl in 20's-30's). Discussed risks with using phenazopyridine with decreased cr clearance and potential side effects; given no adverse effects with once a month usage for the last year, pt seemed to have tolerated; as such, ordered phenazopyridine 100 mg x1 with pt and family aware of the potential risks.  * bladder spasms slowly resolved  - stop oxybutynin  - completed course of ceftriaxone  - Continue to monitor hematuria.    History of C. difficile colitis.  * Started on PO vanco prophylaxis 7/17.  - Continue PO vanco prophylaxis BID - stop after 7/27  - rule out c diff given loose stools.    Acute kidney injury, suspect prerenal.  * Creatinine 0.8-0.9 at baseline.   * Cr 1.03 on 7/15.   * Pt given IVF's 7/17.  Recent Labs   Lab 07/22/23  1202 07/18/23  0647 07/17/23  1243   CR 0.77 0.79 0.88   - Continue to monitor BMP - repeat in am.  - Avoid nephrotoxic medications.    Elevated transaminases, unclear etiology.  * LFT's normal on admit. CT CAP 7/14 showed no acute hepatic abnormality, gallbladder not seen, mild intrahepatic biliary ductal prominence.   * Abdominal US 7/17 negative.  Recent Labs   Lab 07/22/23  1202 07/18/23  0647 07/17/23  1243 07/16/23  1542   ALBUMIN  --   --  3.7  --    BILITOTAL  --   --  0.5  --    ALT  --   --  67*  --    AST  --   --  50*  --    ALKPHOS  --   --  134*  --    PROTEIN  --   --   --  30*   CR 0.77 0.79 0.88  --    - Continue to monitor LFT's.    Indeterminate pulmonary nodules.  * Initial presentation  and imaging as above. CT CAP 7/14 also showed a few indeterminate pulmonary nodules.  - Follow-up with PCP for follow-up imaging if indicated.    PAF on chronic AC.  CVA (2019) history felt to be related to intracranial atherosclerosis.  Cerebrovascular disease.  * H/o stroke in 2019 thought secondary to intracranial atherosclerosis with left PRISCA cerebral infarction. In 8/2022, admitted with left arm and leg weakness and limb shaking of LLE; imaging negative for acute stroke. Head CTA 8/2022 showed bilateral M2 segments demonstrated multiple stenoses ranging from mild to severe; bilateral A2/A3 segments with history multiple stenoses ranging from mild to severe; right supraclinoid ICA demonstrated a small 2 mm inferior outpouching may reflect infundibulum with poorly visualized apical artery or small aneurysm. Started on cilostazol 8/2023.  - Continue apixaban (hold for EGD), cilostazol, atorvastatin.    Depression/anxiety.  - Continue fluoxetine.    Peripheral neuropathy.  - Continue gabapentin.    Takotsubo cardiomyopathy in 2015, resolved.   - Noted.      Clinically Significant Risk Factors                          # Severe Malnutrition: based on nutrition assessment           COVID-19 testing.  COVID-19 PCR Results        10/7/2021    17:39 8/23/2022    12:12   COVID-19 PCR Results   COVID-19 Virus by PCR (External Result) Negative        SARS CoV2 PCR  Negative     Details          This result is from an external source.         COVID-19 Antibody Results, Testing for Immunity         No data to display                Diet: Snacks/Supplements Adult: Ensure Enlive; Between Meals  Clear Liquid Diet    Prophylaxis: PCD's, ambulation. On apixaban.  Limon Catheter: Not present  Lines: None     Code Status: No CPR- Do NOT Intubate    Disposition Plan   Expected discharge: 2d recommended to home with 24h supervision vs TCU pending improvement in bladder pain/spasms, family arrangement of 24h cares vs TCU bed  "availability if pt decides.  Entered: Koby Jnoes,  07/23/2023, 3:33 PM     Communication.  - I d/w pt's greg on 7/20      Interval History   Dysphagia is improved, tolerating clears, but not wanting to advance further. Still desires egd tomorrow. Wants to wait on trying oral pills after her issues with pills being stuck on friday    -Data reviewed today: I reviewed all new labs and imaging over the last 24 hours. I personally reviewed no images or EKG's today.    Physical Exam    , Blood pressure 138/82, pulse 82, temperature 97.7  F (36.5  C), temperature source Oral, resp. rate 17, height 1.499 m (4' 11\"), weight 45.7 kg (100 lb 12 oz), SpO2 98 %. O2 Device: None (Room air)    Vitals:    07/14/23 1700   Weight: 45.7 kg (100 lb 12 oz)     Vital Signs with Ranges  Temp:  [97.7  F (36.5  C)-98.3  F (36.8  C)] 97.7  F (36.5  C)  Pulse:  [75-84] 82  Resp:  [17-18] 17  BP: (128-141)/(61-82) 138/82  SpO2:  [97 %-98 %] 98 %  Patient Vitals for the past 24 hrs:   BP Temp Temp src Pulse Resp SpO2   07/23/23 0743 138/82 97.7  F (36.5  C) Oral 82 17 98 %   07/23/23 0100 -- -- -- -- 18 --   07/23/23 0013 139/74 98.1  F (36.7  C) Oral 79 18 97 %   07/23/23 0005 -- -- -- -- 18 --   07/22/23 1951 128/61 98.3  F (36.8  C) Oral 84 18 97 %   07/22/23 1537 (!) 141/61 98.2  F (36.8  C) Oral 75 18 97 %     I/O's Last 24 hours  I/O last 3 completed shifts:  In: 1514 [I.V.:1514]  Out: 150 [Urine:150]    Constitutional: Awake, alert, oriented, pleasant.  Respiratory: Diminished in bases. No crackles or wheezes.  Cardiovascular: RRR, no m/r/g.  GI: Soft, nt, nd, +BS.  Skin/Integumen:   Other:        Data    Labs reviewed.  Recent Labs   Lab 07/22/23  1202 07/21/23  1747 07/18/23  0647 07/17/23  1243   WBC 6.3 7.1 4.5 6.0   HGB 11.0* 11.2* 10.5* 11.6*   MCV 94 94 94 94    222 189 201     --  142 141   POTASSIUM 3.4  --  3.5 3.2*   CHLORIDE 105  --  112* 109*   CO2 19*  --  21* 20*   BUN 11.7  --  19.9 26.9*   CR " 0.77  --  0.79 0.88   ANIONGAP 17*  --  9 12   DEBI 8.5*  --  8.1* 8.5*   GLC 73  --  92 134*   ALBUMIN  --   --   --  3.7   PROTTOTAL  --   --   --  6.1*   BILITOTAL  --   --   --  0.5   ALKPHOS  --   --   --  134*   ALT  --   --   --  67*   AST  --   --   --  50*     Recent Labs   Lab Test 07/22/23  0214 07/21/23  2305 07/21/23  1747   TROPONIN T HIGH SENSITIVITY 21* 20* 14     Recent Labs   Lab 07/22/23  1202 07/18/23  0647 07/17/23  1243   GLC 73 92 134*      Recent Labs   Lab Test 08/21/22  1929   A1C 5.3        No results for input(s): INR, [id number] in the last 168 hours.  Recent Labs   Lab 07/22/23  1925 07/22/23  1202 07/21/23  1747 07/18/23  0647 07/17/23  1243   WBC  --  6.3 7.1 4.5 6.0   LACT 1.0  --  1.1  --   --        MICRO:  CULTURES (INCLUDING BLOOD AND URINE):  Recent Labs   Lab 07/16/23  1542   CULTURE >100,000 CFU/mL Escherichia coli*       No results found for this or any previous visit (from the past 24 hour(s)).    Medications   All medications were reviewed.    Infusions:    NaCl 50 mL/hr at 07/23/23 1046     Scheduled Medications:    acetaminophen  650 mg Oral Q6H     [Held by provider] acetaminophen  650 mg Oral Q6H     [Held by provider] apixaban ANTICOAGULANT  2.5 mg Oral BID     [Held by provider] atorvastatin  20 mg Oral Daily     [Held by provider] cilostazol  100 mg Oral BID     [Held by provider] FLUoxetine  40 mg Oral QPM     [Held by provider] gabapentin  300 mg Oral At Bedtime     [Held by provider] lactobacillus rhamnosus (GG)  1 capsule Oral BID     lidocaine  1 patch Transdermal Q24h     [Held by provider] multivitamin w/minerals  1 tablet Oral Daily     pantoprazole  40 mg Intravenous BID AC     sodium chloride (PF)  3 mL Intracatheter Q8H     vancomycin  125 mg Oral BID     [Held by provider] vitamin D3  50 mcg Oral QPM     PRN Medications:  cyclobenzaprine, HYDROmorphone, lidocaine 4%, lidocaine (buffered or not buffered), melatonin, naloxone **OR** naloxone **OR**  naloxone **OR** naloxone, nitroGLYcerin, ondansetron, oxyCODONE IR, polyethylene glycol-propylene glycol PF, senna-docusate **OR** senna-docusate, sodium chloride (PF)

## 2023-07-23 NOTE — PLAN OF CARE
Summary: Fall at home w/ back injury. Compression fracture of T3. + UTI   DATE & TIME: 7/22/23 1900- 7/23/23 0730  Cognitive Concerns/ Orientation : A/O x4   BEHAVIOR & AGGRESSION TOOL COLOR: Green  CIWA SCORE: N/A       ABNL VS/O2: VSS on RA  MOBILITY: Ax1 with GB and walker with back brace on when out of bed.  PAIN MANAGMENT: PRN Dilaudid given x1 for back pain, declined lidocaine patches.  DIET: Clear liquids  BOWEL/BLADDER: Continent  ABNL LAB/BG: NA  DRAIN/DEVICES: PIV SL  TELEMETRY RHYTHM: N/A  SKIN: Intact  TESTS/PROCEDURES: Plan for EGD tomorrow 7/24/23  D/C DATE: Discharge pending to TCU vs back to apartmentt. Pt states looking into home health.  OTHER IMPORTANT INFO: Speech, OT and PT following. Back brace must be worn for fracture when getting out of bed

## 2023-07-24 ENCOUNTER — ANESTHESIA (OUTPATIENT)
Dept: GASTROENTEROLOGY | Facility: CLINIC | Age: 83
DRG: 551 | End: 2023-07-24
Payer: COMMERCIAL

## 2023-07-24 ENCOUNTER — ANESTHESIA EVENT (OUTPATIENT)
Dept: GASTROENTEROLOGY | Facility: CLINIC | Age: 83
DRG: 551 | End: 2023-07-24
Payer: COMMERCIAL

## 2023-07-24 LAB
ANION GAP SERPL CALCULATED.3IONS-SCNC: 14 MMOL/L (ref 7–15)
BUN SERPL-MCNC: 7.2 MG/DL (ref 8–23)
CALCIUM SERPL-MCNC: 8.5 MG/DL (ref 8.8–10.2)
CHLORIDE SERPL-SCNC: 107 MMOL/L (ref 98–107)
CREAT SERPL-MCNC: 0.72 MG/DL (ref 0.51–0.95)
DEPRECATED HCO3 PLAS-SCNC: 22 MMOL/L (ref 22–29)
GFR SERPL CREATININE-BSD FRML MDRD: 82 ML/MIN/1.73M2
GLUCOSE SERPL-MCNC: 72 MG/DL (ref 70–99)
POTASSIUM SERPL-SCNC: 3.6 MMOL/L (ref 3.4–5.3)
SODIUM SERPL-SCNC: 143 MMOL/L (ref 136–145)
UPPER GI ENDOSCOPY: NORMAL

## 2023-07-24 PROCEDURE — 250N000011 HC RX IP 250 OP 636: Mod: JZ | Performed by: INTERNAL MEDICINE

## 2023-07-24 PROCEDURE — C9113 INJ PANTOPRAZOLE SODIUM, VIA: HCPCS | Mod: JZ | Performed by: INTERNAL MEDICINE

## 2023-07-24 PROCEDURE — 0DB38ZX EXCISION OF LOWER ESOPHAGUS, VIA NATURAL OR ARTIFICIAL OPENING ENDOSCOPIC, DIAGNOSTIC: ICD-10-PCS | Performed by: INTERNAL MEDICINE

## 2023-07-24 PROCEDURE — 258N000003 HC RX IP 258 OP 636: Performed by: NURSE ANESTHETIST, CERTIFIED REGISTERED

## 2023-07-24 PROCEDURE — 250N000011 HC RX IP 250 OP 636: Mod: JZ | Performed by: NURSE PRACTITIONER

## 2023-07-24 PROCEDURE — 99232 SBSQ HOSP IP/OBS MODERATE 35: CPT | Performed by: STUDENT IN AN ORGANIZED HEALTH CARE EDUCATION/TRAINING PROGRAM

## 2023-07-24 PROCEDURE — 250N000011 HC RX IP 250 OP 636: Mod: JZ | Performed by: HOSPITALIST

## 2023-07-24 PROCEDURE — 0DB28ZX EXCISION OF MIDDLE ESOPHAGUS, VIA NATURAL OR ARTIFICIAL OPENING ENDOSCOPIC, DIAGNOSTIC: ICD-10-PCS | Performed by: INTERNAL MEDICINE

## 2023-07-24 PROCEDURE — 370N000017 HC ANESTHESIA TECHNICAL FEE, PER MIN: Performed by: INTERNAL MEDICINE

## 2023-07-24 PROCEDURE — 250N000011 HC RX IP 250 OP 636: Performed by: NURSE ANESTHETIST, CERTIFIED REGISTERED

## 2023-07-24 PROCEDURE — 120N000001 HC R&B MED SURG/OB

## 2023-07-24 PROCEDURE — 43249 ESOPH EGD DILATION <30 MM: CPT | Performed by: INTERNAL MEDICINE

## 2023-07-24 PROCEDURE — 250N000009 HC RX 250: Performed by: NURSE ANESTHETIST, CERTIFIED REGISTERED

## 2023-07-24 PROCEDURE — 36415 COLL VENOUS BLD VENIPUNCTURE: CPT | Performed by: HOSPITALIST

## 2023-07-24 PROCEDURE — 999N000010 HC STATISTIC ANES STAT CODE-CRNA PER MINUTE: Performed by: INTERNAL MEDICINE

## 2023-07-24 PROCEDURE — 0DB68ZX EXCISION OF STOMACH, VIA NATURAL OR ARTIFICIAL OPENING ENDOSCOPIC, DIAGNOSTIC: ICD-10-PCS | Performed by: INTERNAL MEDICINE

## 2023-07-24 PROCEDURE — 82310 ASSAY OF CALCIUM: CPT | Performed by: HOSPITALIST

## 2023-07-24 PROCEDURE — 0D738ZZ DILATION OF LOWER ESOPHAGUS, VIA NATURAL OR ARTIFICIAL OPENING ENDOSCOPIC: ICD-10-PCS | Performed by: INTERNAL MEDICINE

## 2023-07-24 PROCEDURE — 250N000013 HC RX MED GY IP 250 OP 250 PS 637: Performed by: HOSPITALIST

## 2023-07-24 PROCEDURE — 88305 TISSUE EXAM BY PATHOLOGIST: CPT | Mod: TC | Performed by: INTERNAL MEDICINE

## 2023-07-24 PROCEDURE — 43239 EGD BIOPSY SINGLE/MULTIPLE: CPT | Performed by: INTERNAL MEDICINE

## 2023-07-24 RX ORDER — LIDOCAINE HYDROCHLORIDE 20 MG/ML
INJECTION, SOLUTION INFILTRATION; PERINEURAL PRN
Status: DISCONTINUED | OUTPATIENT
Start: 2023-07-24 | End: 2023-07-24

## 2023-07-24 RX ORDER — PROPOFOL 10 MG/ML
INJECTION, EMULSION INTRAVENOUS PRN
Status: DISCONTINUED | OUTPATIENT
Start: 2023-07-24 | End: 2023-07-24

## 2023-07-24 RX ORDER — ONDANSETRON 2 MG/ML
INJECTION INTRAMUSCULAR; INTRAVENOUS PRN
Status: DISCONTINUED | OUTPATIENT
Start: 2023-07-24 | End: 2023-07-24

## 2023-07-24 RX ORDER — SODIUM CHLORIDE, SODIUM LACTATE, POTASSIUM CHLORIDE, CALCIUM CHLORIDE 600; 310; 30; 20 MG/100ML; MG/100ML; MG/100ML; MG/100ML
INJECTION, SOLUTION INTRAVENOUS CONTINUOUS PRN
Status: DISCONTINUED | OUTPATIENT
Start: 2023-07-24 | End: 2023-07-24

## 2023-07-24 RX ORDER — PROPOFOL 10 MG/ML
INJECTION, EMULSION INTRAVENOUS CONTINUOUS PRN
Status: DISCONTINUED | OUTPATIENT
Start: 2023-07-24 | End: 2023-07-24

## 2023-07-24 RX ADMIN — PANTOPRAZOLE SODIUM 40 MG: 40 INJECTION, POWDER, FOR SOLUTION INTRAVENOUS at 16:16

## 2023-07-24 RX ADMIN — SODIUM CHLORIDE, POTASSIUM CHLORIDE, SODIUM LACTATE AND CALCIUM CHLORIDE: 600; 310; 30; 20 INJECTION, SOLUTION INTRAVENOUS at 13:08

## 2023-07-24 RX ADMIN — LIDOCAINE HYDROCHLORIDE 50 MG: 20 INJECTION, SOLUTION INFILTRATION; PERINEURAL at 13:11

## 2023-07-24 RX ADMIN — PROPOFOL 100 MCG/KG/MIN: 10 INJECTION, EMULSION INTRAVENOUS at 13:11

## 2023-07-24 RX ADMIN — PROPOFOL 30 MG: 10 INJECTION, EMULSION INTRAVENOUS at 13:13

## 2023-07-24 RX ADMIN — ONDANSETRON 4 MG: 2 INJECTION INTRAMUSCULAR; INTRAVENOUS at 13:13

## 2023-07-24 RX ADMIN — ONDANSETRON 4 MG: 2 INJECTION INTRAMUSCULAR; INTRAVENOUS at 15:13

## 2023-07-24 RX ADMIN — PROPOFOL 20 MG: 10 INJECTION, EMULSION INTRAVENOUS at 13:18

## 2023-07-24 RX ADMIN — PANTOPRAZOLE SODIUM 40 MG: 40 INJECTION, POWDER, FOR SOLUTION INTRAVENOUS at 06:30

## 2023-07-24 RX ADMIN — VANCOMYCIN HYDROCHLORIDE 125 MG: KIT at 20:06

## 2023-07-24 RX ADMIN — HYDROMORPHONE HYDROCHLORIDE 0.2 MG: 0.2 INJECTION, SOLUTION INTRAMUSCULAR; INTRAVENOUS; SUBCUTANEOUS at 22:42

## 2023-07-24 ASSESSMENT — ACTIVITIES OF DAILY LIVING (ADL)
ADLS_ACUITY_SCORE: 36
ADLS_ACUITY_SCORE: 38
ADLS_ACUITY_SCORE: 36

## 2023-07-24 ASSESSMENT — LIFESTYLE VARIABLES: TOBACCO_USE: 0

## 2023-07-24 ASSESSMENT — ENCOUNTER SYMPTOMS: DYSRHYTHMIAS: 1

## 2023-07-24 NOTE — ANESTHESIA CARE TRANSFER NOTE
Patient: Leesa Jacinto    Procedure: Procedure(s):  Esophagoscopy, gastroscopy, duodenoscopy (EGD), combined  ESOPHAGOGASTRODUODENOSCOPY, WITH BALLOON DILATION OF LESS THAN 30 MILLIMETERS       Diagnosis: Dysphagia [R13.10]  Diagnosis Additional Information: No value filed.    Anesthesia Type:   MAC     Note:    Oropharynx: oropharynx clear of all foreign objects and spontaneously breathing  Level of Consciousness: drowsy  Oxygen Supplementation: room air    Independent Airway: airway patency satisfactory and stable  Dentition: dentition unchanged  Vital Signs Stable: post-procedure vital signs reviewed and stable  Report to RN Given: handoff report given  Patient transferred to: PACU    Handoff Report: Identifed the Patient, Identified the Reponsible Provider, Reviewed the pertinent medical history, Discussed the surgical course, Reviewed Intra-OP anesthesia mangement and issues during anesthesia, Set expectations for post-procedure period and Allowed opportunity for questions and acknowledgement of understanding      Vitals:  Vitals Value Taken Time   BP     Temp     Pulse     Resp     SpO2 96        Electronically Signed By: TANYA Clifton CRNA  July 24, 2023  1:33 PM

## 2023-07-24 NOTE — ANESTHESIA POSTPROCEDURE EVALUATION
Patient: Leesa Jacinto    Procedure: Procedure(s):  Esophagoscopy, gastroscopy, duodenoscopy (EGD), combined  ESOPHAGOGASTRODUODENOSCOPY, WITH BALLOON DILATION OF LESS THAN 30 MILLIMETERS       Anesthesia Type:  MAC    Note:  Disposition: Inpatient   Postop Pain Control: Uneventful            Sign Out: Well controlled pain   PONV: No   Neuro/Psych: Uneventful            Sign Out: Acceptable/Baseline neuro status   Airway/Respiratory: Uneventful            Sign Out: Acceptable/Baseline resp. status   CV/Hemodynamics: Uneventful            Sign Out: Acceptable CV status; No obvious hypovolemia; No obvious fluid overload   Other NRE: NONE   DID A NON-ROUTINE EVENT OCCUR? No           Last vitals:  Vitals Value Taken Time   /62 07/24/23 1425   Temp 36.6  C (97.9  F) 07/24/23 1425   Pulse 75 07/24/23 1425   Resp 16 07/24/23 1425   SpO2 93 % 07/24/23 1425       Electronically Signed By: Na Stafford MD  July 24, 2023  4:47 PM

## 2023-07-24 NOTE — ANESTHESIA PREPROCEDURE EVALUATION
Anesthesia Pre-Procedure Evaluation    Patient: Leesa Jacinto   MRN: [id number] : 1940        Procedure : Procedure(s):  Esophagoscopy, gastroscopy, duodenoscopy (EGD), combined          Past Medical History:   Diagnosis Date    Cancer (H)     squamous cell    Cerebrovascular accident (H)     Chronic atrial fibrillation (H)     Hyperlipidemia LDL goal <100     Hypothyroidism       Past Surgical History:   Procedure Laterality Date    CHOLECYSTECTOMY        Allergies   Allergen Reactions    Sodium Hypochlorite Shortness Of Breath    Alendronate Diarrhea    Raloxifene Other (See Comments)     RIND on 2019    Sulfa Antibiotics Rash    Sulfasalazine Rash      Social History     Tobacco Use    Smoking status: Never    Smokeless tobacco: Never   Substance Use Topics    Alcohol use: Not Currently     Comment: very rarely      Wt Readings from Last 1 Encounters:   23 43.9 kg (96 lb 12.8 oz)        Anesthesia Evaluation   Pt has had prior anesthetic.     No history of anesthetic complications       ROS/MED HX  ENT/Pulmonary: Comment: Indeterminate pulmonary nodules   (-) tobacco use, asthma, sleep apnea and recent URI   Neurologic:     (+)    peripheral neuropathy (Peripheral),      CVA (No residula),                      Cardiovascular: Comment: h/o Takotsubo cardiomyopathy ()    (+) Dyslipidemia - -   -  - -   Taking blood thinners (Stopped )                     dysrhythmias (pAFib),              METS/Exercise Tolerance: >4 METS    Hematologic:       Musculoskeletal: Comment: Traumatic (fall) moderate burst compression fracture of T3 with mild retropulsion of bony fragments into the spinal canal      GI/Hepatic: Comment: Dysphagia  H/o C. Diff colitis     (-) GERD and liver disease   Renal/Genitourinary:     (+) renal disease, type: ARF,            Endo:     (+)          thyroid problem, hypothyroidism,        (-) obesity   Psychiatric/Substance Use:     (+) psychiatric history  depression and anxiety       Infectious Disease:    (-) Recent Fever   Malignancy:       Other:            Physical Exam    Airway        Mallampati: II   TM distance: > 3 FB   Neck ROM: full   Mouth opening: < 3 cm    Respiratory Devices and Support         Dental       (+) Minor Abnormalities - some fillings, tiny chips      Cardiovascular          Rhythm and rate: regular and normal     Pulmonary           breath sounds clear to auscultation           OUTSIDE LABS:  CBC:   Lab Results   Component Value Date    WBC 6.3 07/22/2023    WBC 7.1 07/21/2023    HGB 11.0 (L) 07/22/2023    HGB 11.2 (L) 07/21/2023    HCT 32.3 (L) 07/22/2023    HCT 33.4 (L) 07/21/2023     07/22/2023     07/21/2023     BMP:   Lab Results   Component Value Date     07/24/2023     07/22/2023    POTASSIUM 3.6 07/24/2023    POTASSIUM 3.4 07/22/2023    CHLORIDE 107 07/24/2023    CHLORIDE 105 07/22/2023    CO2 22 07/24/2023    CO2 19 (L) 07/22/2023    BUN 7.2 (L) 07/24/2023    BUN 11.7 07/22/2023    CR 0.72 07/24/2023    CR 0.77 07/22/2023    GLC 72 07/24/2023    GLC 73 07/22/2023     COAGS:   Lab Results   Component Value Date    PTT 30 08/21/2022    INR 1.12 08/21/2022     POC: No results found for: BGM, HCG, HCGS  HEPATIC:   Lab Results   Component Value Date    ALBUMIN 3.7 07/17/2023    PROTTOTAL 6.1 (L) 07/17/2023    ALT 67 (H) 07/17/2023    AST 50 (H) 07/17/2023    ALKPHOS 134 (H) 07/17/2023    BILITOTAL 0.5 07/17/2023     OTHER:   Lab Results   Component Value Date    LACT 1.0 07/22/2023    A1C 5.3 08/21/2022    DEBI 8.5 (L) 07/24/2023    MAG 2.1 07/18/2023       Anesthesia Plan    ASA Status:  3    NPO Status:  NPO Appropriate    Anesthesia Type: MAC.     - Reason for MAC: straight local not clinically adequate   Induction: Intravenous, Propofol.   Maintenance: TIVA.        Consents    Anesthesia Plan(s) and associated risks, benefits, and realistic alternatives discussed. Questions answered and  patient/representative(s) expressed understanding.     - Discussed: Risks, Benefits and Alternatives for the PROCEDURE were discussed     - Discussed with:  Patient       - Patient is DNR/DNI Status: Yes             DNR/DNI SUSPEND: Patient is in AGREEMENT with: ETT/ LMA, chemical resuscitation. Patient DOES NOT WANT: CPR, Defib/DCCV.    Use of blood products discussed: Yes.     - Discussed with: Patient.     - Consented: consented to blood products            Reason for refusal: other.     Postoperative Care       PONV prophylaxis: Ondansetron (or other 5HT-3)     Comments:                Na Stafford MD

## 2023-07-24 NOTE — PROGRESS NOTES
[facility]    Internal Medicine Hospitalist Progress Note  07/24/2023  I evaluated patient on the above date.      Assessment & Plan New actions/orders today (07/24/2023) are underlined. All lab results in the assessment and plan were reviewed.    Leesa Jacinto is a very pleasant 83 year old female with past medical history including PAF on chronic anticoagulation; HLD; CVA history; hypothyroidism; depression/anxiety; peripheral neuropathy; osteoporosis; h/o Takotsubo cardiomyopathy; and h/o C. Diff; who presented 7/14/2023 with acute back pain after a fall.'    On initially evaluation 7/14, VSS. ECG with SR with NSTWA, no acute ischemic changes. Labs notable for BMP with bicarb 18; CBC unremarkable; LFT's normal. Head CT 7/14 negative for acute findings. CT spine positive for compression fractures and follow up MRI obtained    MRI T-spine 7/14 showed recent-appearing moderate burst compression fracture deformity of the T3 vertebral body again noted; chronic-appearing fracture of the T12 vertebral body again noted; bone marrow edema in the T3 vertebral body consistent with recent fracture; bone marrow edema in the T2 vertebral body possibly due to altered biomechanics at T2-T3, marrow signal otherwise normal, no other evidence for fracture or pathologic bony lesion; mixed signal intensity nodule in the T8 vertebral body that could represent an atypical hemangioma.    Dysphagia  Chest pain likely secondary to esophageal etiology  Possible pill esophagitis  Possible atypical achalasia  Acute on chronic issue, but now severe to the point where she was unable to tolerate oral intake per her report.   *SLP evaluated earlier this stay and signed off without concerns  *XR esophagram with dysmotility but no obvious e/o stricture. Of note, she did have a medication that appeared stuck at the GE junction and stricture was unable to be fully ruled out  RRT later that day due to chest pain  likely causes by globus and esophageal spasm    - GI planning on EGD today, follow up on procedure  - continue PPI  - patient wants to continue holding oral pills given her dysphagia. Did switch a majority to IV but unable to change out some including home eliquis    Traumatic (fall) moderate burst compression fracture of T3 with mild retropulsion of bony fragments into the spinal canal.  * Initial presentation as above. Neurosurgery consulted on admit.  * Started on multiple pain meds including lidocaine patch, PRN oxycodone, PRN cyclobenzaprine.   * Neurosurgery recommended TLSO.   * Therapies recommended TCU.  - Continue TLSO when out of bed.  - Continue lifting restriction between 5 and 10 pounds.  - Continue lidocaine patch 4% q24h; PRN acetaminophen 650 mg q6h; PRN cyclobenzaprine 10 mg q8h; PRN oxycodone 2.5-5 mg q6h; PRN IV hydromorphone 0.2-0.3 mg q4h; minimize opioids as able.  - Continue PT  - Follow-up with Neurosurgery in 6 weeks with x-ray prior.    E. Coli UTI.  Gross hematuria, suspect related to above with concomitant anticoagulation.  Severe bladder pain spasms/dysuria, suspect chronic component and worsened by UTI.   History of recurrent UTI.  * Managed by primary doctor. Generally prefers to avoid antibiotics, due to risk of colitis. Regularly uses over-the-counter AZO (phenazopyridine).   * On 7/16, noted with gross hematuria, dysuria. UA grossly abnormal. Started on ceftriaxone. UC growing E. Coli.   * On 7/17, pt c/o severe episodes of bladder pain/dysuria. She had been taking phenazopyridine OTC with good effect for ~1 year, usually about 1x/week. Pt and daughter were upset that this had been stopped. Discussed with them that phenazopyridine was not recommended with pt's renal function (normal cr, but crcl in 20's-30's). Discussed risks with using phenazopyridine with decreased cr clearance and potential side effects; given no adverse effects with once a month usage for the last year, pt  seemed to have tolerated; as such, ordered phenazopyridine 100 mg x1 with pt and family aware of the potential risks.  * bladder spasms slowly resolved  - stop oxybutynin  - completed course of ceftriaxone  - Continue to monitor hematuria.    History of C. difficile colitis.  * Started on PO vanco prophylaxis 7/17.  - Continue PO vanco prophylaxis BID - stop after 7/27  - rule out c diff given loose stools.    Acute kidney injury, suspect prerenal.  * Creatinine 0.8-0.9 at baseline.   * Cr 1.03 on 7/15.   * Pt given IVF's 7/17.  Recent Labs   Lab 07/24/23  0804 07/22/23  1202 07/18/23  0647 07/17/23  1243   CR 0.72 0.77 0.79 0.88     - Continue to monitor BMP - repeat in am.  - Avoid nephrotoxic medications.    Elevated transaminases, unclear etiology.  * LFT's normal on admit. CT CAP 7/14 showed no acute hepatic abnormality, gallbladder not seen, mild intrahepatic biliary ductal prominence.   * Abdominal US 7/17 negative.  Recent Labs   Lab 07/24/23  0804 07/22/23  1202 07/18/23  0647 07/17/23  1243   ALBUMIN  --   --   --  3.7   BILITOTAL  --   --   --  0.5   ALT  --   --   --  67*   AST  --   --   --  50*   ALKPHOS  --   --   --  134*   CR 0.72 0.77 0.79 0.88     - Continue to monitor LFT's.    Indeterminate pulmonary nodules.  * Initial presentation and imaging as above. CT CAP 7/14 also showed a few indeterminate pulmonary nodules.  - Follow-up with PCP for follow-up imaging if indicated.    PAF on chronic AC.  CVA (2019) history felt to be related to intracranial atherosclerosis.  Cerebrovascular disease.  * H/o stroke in 2019 thought secondary to intracranial atherosclerosis with left PRISCA cerebral infarction. In 8/2022, admitted with left arm and leg weakness and limb shaking of LLE; imaging negative for acute stroke. Head CTA 8/2022 showed bilateral M2 segments demonstrated multiple stenoses ranging from mild to severe; bilateral A2/A3 segments with history multiple stenoses ranging from mild to severe;  "right supraclinoid ICA demonstrated a small 2 mm inferior outpouching may reflect infundibulum with poorly visualized apical artery or small aneurysm. Started on cilostazol 8/2023.  - Continue apixaban (hold for EGD), cilostazol, atorvastatin.    Depression/anxiety.  - Continue fluoxetine.    Peripheral neuropathy.  - Continue gabapentin.    Takotsubo cardiomyopathy in 2015, resolved.   - Noted.      Clinically Significant Risk Factors                          # Severe Malnutrition: based on nutrition assessment             COVID-19 testing.  COVID-19 PCR Results          10/7/2021    17:39 8/23/2022    12:12   COVID-19 PCR Results   COVID-19 Virus by PCR (External Result) Negative        SARS CoV2 PCR  Negative       Details          This result is from an external source.             COVID-19 Antibody Results, Testing for Immunity           No data to display                Diet: Snacks/Supplements Adult: Ensure Enlive; Between Meals  Clear Liquid Diet    Prophylaxis: PCD's, ambulation. On apixaban.  Limon Catheter: Not present  Lines: None     Code Status: No CPR- Do NOT Intubate    Disposition Plan   Expected discharge: 2d recommended to  home with 24h supervision vs TCU  pending  improvement in bladder pain/spasms, family arrangement of 24h cares vs TCU bed availability if pt decides .  Entered: Teri Pete DO 07/24/2023, 9:40 AM     Communication.  - I d/w pt's greg on 7/20      Interval History   Patient without complaints today other then her swallowing. Not actually painful however she hasn't tried to really eat much. No pills since her RRT. She is in good spirits and ready for her EGD.    -Data reviewed today: I reviewed all new labs and imaging over the last 24 hours. I personally reviewed no images or EKG's today.    Physical Exam    , Blood pressure (!) 145/68, pulse 80, temperature 98.3  F (36.8  C), temperature source Oral, resp. rate 18, height 1.499 m (4' 11\"), weight 45.7 kg (100 lb 12 " oz), SpO2 96 %. O2 Device: None (Room air)    Vitals:    07/14/23 1700   Weight: 45.7 kg (100 lb 12 oz)     Vital Signs with Ranges  Temp:  [97.4  F (36.3  C)-98.3  F (36.8  C)] 98.3  F (36.8  C)  Pulse:  [72-84] 80  Resp:  [18] 18  BP: (138-148)/(68-83) 145/68  SpO2:  [95 %-97 %] 96 %  Patient Vitals for the past 24 hrs:   BP Temp Temp src Pulse Resp SpO2   07/24/23 0733 (!) 145/68 98.3  F (36.8  C) Oral 80 18 96 %   07/24/23 0448 (!) 148/74 98  F (36.7  C) Oral 78 18 95 %   07/23/23 2241 -- -- -- -- 18 --   07/23/23 2223 -- -- -- -- 18 --   07/23/23 2105 (!) 143/83 98  F (36.7  C) Oral 84 18 97 %   07/23/23 1545 138/73 97.4  F (36.3  C) Oral 72 18 97 %       I/O's Last 24 hours  I/O last 3 completed shifts:  In: 1141 [I.V.:1141]  Out: 750 [Urine:750]    Constitutional: Awake, alert, oriented, pleasant.  Respiratory: Diminished in bases. No crackles or wheezes.  Cardiovascular: RRR, no m/r/g.  GI: Soft, nt, nd, +BS.  Skin/Integumen:   Other:        Data    Labs reviewed.  Recent Labs   Lab 07/24/23  0804 07/22/23  1202 07/21/23  1747 07/18/23  0647 07/17/23  1243   WBC  --  6.3 7.1 4.5 6.0   HGB  --  11.0* 11.2* 10.5* 11.6*   MCV  --  94 94 94 94   PLT  --  203 222 189 201    141  --  142 141   POTASSIUM 3.6 3.4  --  3.5 3.2*   CHLORIDE 107 105  --  112* 109*   CO2 22 19*  --  21* 20*   BUN 7.2* 11.7  --  19.9 26.9*   CR 0.72 0.77  --  0.79 0.88   ANIONGAP 14 17*  --  9 12   DEBI 8.5* 8.5*  --  8.1* 8.5*   GLC 72 73  --  92 134*   ALBUMIN  --   --   --   --  3.7   PROTTOTAL  --   --   --   --  6.1*   BILITOTAL  --   --   --   --  0.5   ALKPHOS  --   --   --   --  134*   ALT  --   --   --   --  67*   AST  --   --   --   --  50*       Recent Labs   Lab Test 07/22/23  0214 07/21/23  2305 07/21/23  1747   TROPONIN T HIGH SENSITIVITY 21* 20* 14       Recent Labs   Lab 07/24/23  0804 07/22/23  1202 07/18/23  0647 07/17/23  1243   GLC 72 73 92 134*        Recent Labs   Lab Test 08/21/22  1929   A1C 5.3          No  results for input(s): INR, [id number] in the last 168 hours.  Recent Labs   Lab 07/22/23  1925 07/22/23  1202 07/21/23  1747 07/18/23  0647 07/17/23  1243   WBC  --  6.3 7.1 4.5 6.0   LACT 1.0  --  1.1  --   --          MICRO:  CULTURES (INCLUDING BLOOD AND URINE):  No lab results found in last 7 days.      No results found for this or any previous visit (from the past 24 hour(s)).    Medications   All medications were reviewed.    Infusions:   NaCl 50 mL/hr at 07/23/23 2000     Scheduled Medications:   acetaminophen  650 mg Oral Q6H    [Held by provider] acetaminophen  650 mg Oral Q6H    [Held by provider] apixaban ANTICOAGULANT  2.5 mg Oral BID    [Held by provider] atorvastatin  20 mg Oral Daily    [Held by provider] cilostazol  100 mg Oral BID    [Held by provider] FLUoxetine  40 mg Oral QPM    [Held by provider] gabapentin  300 mg Oral At Bedtime    [Held by provider] lactobacillus rhamnosus (GG)  1 capsule Oral BID    lidocaine  1 patch Transdermal Q24h    [Held by provider] multivitamin w/minerals  1 tablet Oral Daily    pantoprazole  40 mg Intravenous BID AC    sodium chloride (PF)  3 mL Intracatheter Q8H    vancomycin  125 mg Oral BID    [Held by provider] vitamin D3  50 mcg Oral QPM     PRN Medications:  cyclobenzaprine, HYDROmorphone, lidocaine 4%, lidocaine (buffered or not buffered), melatonin, naloxone **OR** naloxone **OR** naloxone **OR** naloxone, nitroGLYcerin, ondansetron, oxyCODONE IR, polyethylene glycol-propylene glycol PF, senna-docusate **OR** senna-docusate, sodium chloride (PF)

## 2023-07-24 NOTE — PLAN OF CARE
Summary: Fall at home w/ back injury. Compression fracture of T3. + UTI (resolved)  DATE & TIME: 7/23/23 1900- 7/24/23 0730  Cognitive Concerns/ Orientation : A/O x4   BEHAVIOR & AGGRESSION TOOL COLOR: Green  CIWA SCORE: N/A       ABNL VS/O2: VSS on RA  MOBILITY: Ax1 with GB and walker with back brace other than up to BR.  PAIN MANAGMENT: PRN Dilaudid given x1, pt declined lidocaine patches and liquid Tylenol  DIET: NPO since midnight, was on clear liquids  BOWEL/BLADDER: Continent B/B, loose stools, C.diff negative.  ABNL LAB/BG: NA  DRAIN/DEVICES: PIV with 1/2 NS 50 ml/hr  TELEMETRY RHYTHM: NSR  SKIN: Intact  TESTS/PROCEDURES: Plan for EGD today 7/24/23  D/C DATE: Discharge pending to TCU vs back to apartmentt. Pt states looking into home health.  OTHER IMPORTANT INFO: Speech, OT and PT following. Back brace must be worn for fracture when getting out of bed, but not when going to the bathroom                Lot # (Optional): [id number] Lot # (Optional): [id number] normal (ped)...

## 2023-07-24 NOTE — PLAN OF CARE
Summary: Fall at home w/ back injury. Compression fracture of T3. + UTI (resolved)  DATE & TIME: 07/24/23 7-3 pm   Cognitive Concerns/ Orientation : A & O x 4   BEHAVIOR & AGGRESSION TOOL COLOR: Green  ABNL VS/O2: VSS on RA  MOBILITY: A1 with GB and walker with back brace other than up to BR.  PAIN MANAGMENT: Declined any intervention for pain. States only with some movements.   DIET: NPO since midnight, starting on clears and diet is advance as tolerated.   BOWEL/BLADDER: Continent B/B, loose stools x2.   ABNL LAB/BG: WDL  DRAIN/DEVICES: PIV SL  TELEMETRY RHYTHM: SR  SKIN: Intact  TESTS/PROCEDURES: EGD completed today 7/24/23  D/C DATE: Discharge tomorrow if remains stable and able to tolerate a diet.   OTHER IMPORTANT INFO: Speech, OT and PT following. Back brace must be worn for fracture when getting out of bed, but not when going to the bathroom. Ambulated hallway x1 this morning. Tolerated EGD well.

## 2023-07-25 VITALS
WEIGHT: 96.8 LBS | RESPIRATION RATE: 16 BRPM | HEART RATE: 81 BPM | DIASTOLIC BLOOD PRESSURE: 75 MMHG | SYSTOLIC BLOOD PRESSURE: 147 MMHG | HEIGHT: 59 IN | TEMPERATURE: 97.9 F | OXYGEN SATURATION: 98 % | BODY MASS INDEX: 19.52 KG/M2

## 2023-07-25 LAB
ATRIAL RATE - MUSE: 84 BPM
ATRIAL RATE - MUSE: 98 BPM
DIASTOLIC BLOOD PRESSURE - MUSE: NORMAL MMHG
DIASTOLIC BLOOD PRESSURE - MUSE: NORMAL MMHG
INTERPRETATION ECG - MUSE: NORMAL
INTERPRETATION ECG - MUSE: NORMAL
P AXIS - MUSE: 72 DEGREES
P AXIS - MUSE: NORMAL DEGREES
PR INTERVAL - MUSE: 162 MS
PR INTERVAL - MUSE: NORMAL MS
QRS DURATION - MUSE: 78 MS
QRS DURATION - MUSE: 80 MS
QT - MUSE: 404 MS
QT - MUSE: 424 MS
QTC - MUSE: 492 MS
QTC - MUSE: 515 MS
R AXIS - MUSE: 51 DEGREES
R AXIS - MUSE: 51 DEGREES
SYSTOLIC BLOOD PRESSURE - MUSE: NORMAL MMHG
SYSTOLIC BLOOD PRESSURE - MUSE: NORMAL MMHG
T AXIS - MUSE: 18 DEGREES
T AXIS - MUSE: 68 DEGREES
VENTRICULAR RATE- MUSE: 81 BPM
VENTRICULAR RATE- MUSE: 98 BPM

## 2023-07-25 PROCEDURE — 88312 SPECIAL STAINS GROUP 1: CPT | Mod: 26 | Performed by: PATHOLOGY

## 2023-07-25 PROCEDURE — 250N000013 HC RX MED GY IP 250 OP 250 PS 637: Performed by: NURSE PRACTITIONER

## 2023-07-25 PROCEDURE — 250N000013 HC RX MED GY IP 250 OP 250 PS 637: Performed by: STUDENT IN AN ORGANIZED HEALTH CARE EDUCATION/TRAINING PROGRAM

## 2023-07-25 PROCEDURE — 88305 TISSUE EXAM BY PATHOLOGIST: CPT | Mod: 26 | Performed by: PATHOLOGY

## 2023-07-25 PROCEDURE — C9113 INJ PANTOPRAZOLE SODIUM, VIA: HCPCS | Mod: JZ | Performed by: INTERNAL MEDICINE

## 2023-07-25 PROCEDURE — 250N000013 HC RX MED GY IP 250 OP 250 PS 637: Performed by: HOSPITALIST

## 2023-07-25 PROCEDURE — 250N000011 HC RX IP 250 OP 636: Mod: JZ | Performed by: INTERNAL MEDICINE

## 2023-07-25 PROCEDURE — 99239 HOSP IP/OBS DSCHRG MGMT >30: CPT | Performed by: STUDENT IN AN ORGANIZED HEALTH CARE EDUCATION/TRAINING PROGRAM

## 2023-07-25 RX ORDER — PANTOPRAZOLE SODIUM 40 MG/1
40 TABLET, DELAYED RELEASE ORAL 2 TIMES DAILY
Qty: 60 TABLET | Refills: 0 | Status: SHIPPED | OUTPATIENT
Start: 2023-07-25 | End: 2023-08-24

## 2023-07-25 RX ORDER — LIDOCAINE 4 G/G
1 PATCH TOPICAL EVERY 24 HOURS
Qty: 30 PATCH | Refills: 0 | Status: SHIPPED | OUTPATIENT
Start: 2023-07-25

## 2023-07-25 RX ORDER — PANTOPRAZOLE SODIUM 40 MG/1
40 TABLET, DELAYED RELEASE ORAL
Status: DISCONTINUED | OUTPATIENT
Start: 2023-07-26 | End: 2023-07-25 | Stop reason: HOSPADM

## 2023-07-25 RX ORDER — LEVOTHYROXINE SODIUM 50 UG/1
50 TABLET ORAL
Status: DISCONTINUED | OUTPATIENT
Start: 2023-07-25 | End: 2023-07-25 | Stop reason: HOSPADM

## 2023-07-25 RX ORDER — OXYCODONE HYDROCHLORIDE 5 MG/1
2.5-5 TABLET ORAL EVERY 6 HOURS PRN
Qty: 7 TABLET | Refills: 0 | Status: SHIPPED | OUTPATIENT
Start: 2023-07-25

## 2023-07-25 RX ADMIN — APIXABAN 2.5 MG: 2.5 TABLET, FILM COATED ORAL at 09:16

## 2023-07-25 RX ADMIN — PANTOPRAZOLE SODIUM 40 MG: 40 INJECTION, POWDER, FOR SOLUTION INTRAVENOUS at 06:40

## 2023-07-25 RX ADMIN — CILOSTAZOL 100 MG: 100 TABLET ORAL at 09:16

## 2023-07-25 RX ADMIN — LEVOTHYROXINE SODIUM 50 MCG: 50 TABLET ORAL at 10:35

## 2023-07-25 RX ADMIN — VANCOMYCIN HYDROCHLORIDE 125 MG: KIT at 09:16

## 2023-07-25 RX ADMIN — ACETAMINOPHEN 650 MG: 325 TABLET, FILM COATED ORAL at 10:35

## 2023-07-25 ASSESSMENT — ACTIVITIES OF DAILY LIVING (ADL)
ADLS_ACUITY_SCORE: 37
ADLS_ACUITY_SCORE: 36
ADLS_ACUITY_SCORE: 37
ADLS_ACUITY_SCORE: 36

## 2023-07-25 NOTE — PROGRESS NOTES
Care Management Discharge Note    Discharge Date: 07/25/2023       Discharge Disposition: Home     Discharge Services: [facility] RN/PT?OT    Discharge DME: Brace    Discharge Transportation: family or friend will provide    Private pay costs discussed: Not applicable    Does the patient's insurance plan have a 3 day qualifying hospital stay waiver?  No    PAS Confirmation Code:  NA  Patient/family educated on Medicare website which has current facility and service quality ratings: yes    Education Provided on the Discharge Plan:  yes  Persons Notified of Discharge Plans: Patient, Nsg  Patient/Family in Agreement with the Plan: yes    Handoff Referral Completed: No    Additional Information:  Patient is discharging home.  Her family has helped her set-up 24/7 private duty home help until she is getting around better.  [facility] has accepted for home care services of RN/PT/OT.  Conversed with Jaycob  from [facility].  They are able to access information from [employer] and will plan to see patient tomorrow.  Patient was updated.  Patient preferred to make her own follow-up appointments. Patient's daughter is providing transportation home.    Heather Curry RN  Inpatient Care Management  [phone]

## 2023-07-25 NOTE — PLAN OF CARE
Goal Outcome Evaluation:    Summary: Fall at home w/ back injury. Compression fracture of T3. + UTI (resolved)  DATE & TIME: 07/24/23 8894-9105   Cognitive Concerns/ Orientation : A & O x 4   BEHAVIOR & AGGRESSION TOOL COLOR: Green  ABNL VS/O2: VSS on RA  MOBILITY:Ax1 GB/W utilizing back brace when ambulating outside of room/further than bathroom  PAIN MANAGMENT: Denies pain at rest - minor back pain upon standing that quickly resolves without intervention - C/O increased back pain at bedtime - admin IV dilaudid x1  DIET: advanced from clears to Full Liq.   BOWEL/BLADDER: Continent B/B, Per pt report pt is no longer having loose stools  ABNL LAB/BG: WDL  DRAIN/DEVICES: PIV SL  TELEMETRY RHYTHM: SR  SKIN: Intact  TESTS/PROCEDURES: EGD completed today 7/24/23  D/C DATE: Discharge tomorrow if remains stable and able to tolerate a diet.   OTHER IMPORTANT INFO: Speech, OT and PT following. Pt's appetite was fair following procedure consuming two Jello & shaved ice - upon eating first jello pt complained of slight discomfort that resolved following completion of meal.

## 2023-07-25 NOTE — PLAN OF CARE
Discharge    Patient discharged to home via wheelchair with daughter.     Care plan note  Summary: Fall at home w/ back injury. Compression fracture of T3. + UTI (resolved)  DATE & TIME: 07/25/23 7-3 pm   Cognitive Concerns/ Orientation : A & O x 4   BEHAVIOR & AGGRESSION TOOL COLOR: Green  ABNL VS/O2: VSS on RA  MOBILITY: A1 GB/W utilizing back brace when ambulating outside of room/further than bathroom  PAIN MANAGMENT: C/O of back pain 6/10 at rest; Tylenol x1.  DIET: Advanced to regular, tolerated.   BOWEL/BLADDER: Continent B/B, Per pt report pt is no longer having loose stools  ABNL LAB/BG: N/A  DRAIN/DEVICES: PIV SL and removed for discharge.   TELEMETRY RHYTHM: SR  SKIN: Intact; scattered bruising.   TESTS/PROCEDURES: Had an EGD completed on 7/24/23.   D/C DATE: Discharge pending home care set up.   OTHER IMPORTANT INFO: Doing overall well. Stable. Tolerated solid foods and pills.     Listed belongings gathered and given to patient (including from security/pharmacy). Yes  Care Plan and Patient education resolved: Yes  Prescriptions if needed, hard copies sent with patient  NA  Medication Bin checked and emptied on discharge Yes  SW/care coordinator/charge RN aware of discharge: Yes

## 2023-07-25 NOTE — PLAN OF CARE
Goal Outcome Evaluation:       Summary: Fall at home w/ back injury. Compression fracture of T3. + UTI (resolved)  DATE & TIME: 07/24/23-07/25/23 2090-0514  Cognitive Concerns/ Orientation : A & O x 4   BEHAVIOR & AGGRESSION TOOL COLOR: Green  ABNL VS/O2: VSS on RA  MOBILITY:Ax1 GB/W utilizing back brace when ambulating outside of room/further than bathroom  PAIN MANAGMENT: C/O of back pain 5/10 at rest; refused Tylenol and pain medication.   DIET: Full Liquid diet    BOWEL/BLADDER: Continent B/B, Per pt report pt is no longer having loose stools  ABNL LAB/BG: WDL  DRAIN/DEVICES: PIV SL  TELEMETRY RHYTHM: SR  SKIN: Intact; scattered bruising   TESTS/PROCEDURES: Had an EGD completed on 7/24/23  D/C DATE: Possible discharge if able to tolerate a diet.   OTHER IMPORTANT INFO: Speech, OT and PT following.

## 2023-07-25 NOTE — DISCHARGE SUMMARY
[facility]  Hospitalist Discharge Summary      Date of Admission:  7/14/2023  Date of Discharge:  7/25/2023  Discharging Provider: Teri Pete DO  Discharge Service: Hospitalist Service    Discharge Diagnoses   See below    Clinically Significant Risk Factors     # Severe Malnutrition: based on nutrition assessment      Follow-ups Needed After Discharge   Follow-up Appointments     Follow-up and recommended labs and tests       Your Neurosurgical follow up appointments have been recommended in 6   weeks with XR prior. You may call [phone] to make, confirm or change   your follow-up Neurosurgery appointment dates and/or times.        Follow-up and recommended labs and tests       Follow up with primary care provider, Carlos Kim, within 7 days   for hospital follow- up.  No follow up labs or test are needed.            Discharge Disposition   Discharged to home  Condition at discharge: Stable    Hospital Course   Leesa Jacinto is a very pleasant 83 year old female with past medical history including PAF on chronic anticoagulation; HLD; CVA history; hypothyroidism; depression/anxiety; peripheral neuropathy; osteoporosis; h/o Takotsubo cardiomyopathy; and h/o C. Diff; who presented 7/14/2023 with acute back pain after a fall. Found to have acute compression fractures of the spine       MRI T-spine 7/14 showed recent-appearing moderate burst compression fracture deformity of the T3 vertebral body again noted; chronic-appearing fracture of the T12 vertebral body again noted; bone marrow edema in the T3 vertebral body consistent with recent fracture; bone marrow edema in the T2 vertebral body possibly due to altered biomechanics at T2-T3, marrow signal otherwise normal, no other evidence for fracture or pathologic bony lesion; mixed signal intensity nodule in the T8 vertebral body that could represent an atypical hemangioma.     Traumatic (fall) moderate burst  compression fracture of T3 with mild retropulsion of bony fragments into the spinal canal.  * Initial presentation as above. Neurosurgery consulted on admit recommended TLSO  * Therapies recommended TCU however patient declines for private pay home care with home health  - Continue TLSO when out of bed.  - Continue lifting restriction between 5 and 10 pounds  - will discharge with lidocaine patch and oxycodone  - Follow-up with Neurosurgery in 6 weeks with x-ray prior    Dysphagia  Chest pain likely secondary to esophageal etiology  Possible pill esophagitis  Possible atypical achalasia  Acute on chronic issue, but now severe to the point where she was unable to tolerate oral intake per her report.   *SLP evaluated earlier this stay and signed off without concerns  *XR esophagram with dysmotility but no obvious e/o stricture. Of note, she did have a medication that appeared stuck at the GE junction and stricture was unable to be fully ruled out  RRT later that day due to chest pain likely causes by globus and esophageal spasm     - dilation with EGD 07/24. Patient tolerated pills and regular diet on discharge continue PPI     E. Coli UTI.  Gross hematuria, suspect related to above with concomitant anticoagulation.  Severe bladder pain spasms/dysuria, suspect chronic component and worsened by UTI.   History of recurrent UTI.  * Managed by primary doctor. Generally prefers to avoid antibiotics, due to risk of colitis. Regularly uses over-the-counter AZO (phenazopyridine).   * On 7/16, noted with gross hematuria, dysuria. UA grossly abnormal. Started on ceftriaxone. UC growing E. Coli.   * On 7/17, pt c/o severe episodes of bladder pain/dysuria. She had been taking phenazopyridine OTC with good effect for ~1 year, usually about 1x/week. Pt and daughter were upset that this had been stopped. Discussed with them that phenazopyridine was not recommended with pt's renal function (normal cr, but crcl in 20's-30's).  Discussed risks with using phenazopyridine with decreased cr clearance and potential side effects; given no adverse effects with once a month usage for the last year, pt seemed to have tolerated; as such, ordered phenazopyridine 100 mg x1 with pt and family aware of the potential risks.  * bladder spasms slowly resolved  - stop oxybutynin  - completed course of ceftriaxone  - Continue to monitor hematuria.     History of C. difficile colitis.  * Started on PO vanco prophylaxis 7/17.  - Continue PO vanco prophylaxis BID stop on discharge     Acute kidney injury, suspect prerenal.  * Creatinine 0.8-0.9 at baseline.   * Cr 1.03 on 7/15.   * Pt given IVF's 7/17.         Recent Labs   Lab 07/24/23  0804 07/22/23  1202 07/18/23  0647 07/17/23  1243   CR 0.72 0.77 0.79 0.88      - Continue to monitor BMP - repeat in am.  - Avoid nephrotoxic medications.     Elevated transaminases, unclear etiology.  * LFT's normal on admit. CT CAP 7/14 showed no acute hepatic abnormality, gallbladder not seen, mild intrahepatic biliary ductal prominence.   * Abdominal US 7/17 negative.  - monitor with PCP     Indeterminate pulmonary nodules.  * Initial presentation and imaging as above. CT CAP 7/14 also showed a few indeterminate pulmonary nodules.  - Follow-up with PCP for follow-up imaging if indicated.     PAF on chronic AC.  CVA (2019) history felt to be related to intracranial atherosclerosis.  Cerebrovascular disease.  * H/o stroke in 2019 thought secondary to intracranial atherosclerosis with left PRISCA cerebral infarction. In 8/2022, admitted with left arm and leg weakness and limb shaking of LLE; imaging negative for acute stroke. Head CTA 8/2022 showed bilateral M2 segments demonstrated multiple stenoses ranging from mild to severe; bilateral A2/A3 segments with history multiple stenoses ranging from mild to severe; right supraclinoid ICA demonstrated a small 2 mm inferior outpouching may reflect infundibulum with poorly visualized  apical artery or small aneurysm. Started on cilostazol 8/2023.  - Continue apixaban cilostazol, atorvastatin.     Depression/anxiety.  - Continue fluoxetine.     Peripheral neuropathy.  - Continue gabapentin.     Takotsubo cardiomyopathy in 2015, resolved.   - Noted.       Consultations This Hospital Stay   ORTHOSIS BRACE IP CONSULT  PHYSICAL THERAPY ADULT IP CONSULT  CARE MANAGEMENT / SOCIAL WORK IP CONSULT  OCCUPATIONAL THERAPY ADULT IP CONSULT  SPEECH LANGUAGE PATH ADULT IP CONSULT  NUTRITION SERVICES ADULT IP CONSULT  GASTROENTEROLOGY IP CONSULT  VASCULAR ACCESS ADULT IP CONSULT  CARE MANAGEMENT / SOCIAL WORK IP CONSULT    Code Status   No CPR- Do NOT Intubate    Time Spent on this Encounter   I, Teri Pete DO, personally saw the patient today and spent greater than 30 minutes discharging this patient.       Teri Pete DO  [place] [address] [facility]  [address] [place] [address]  Phone: [phone]  ______________________________________________________________________    Physical Exam   Vital Signs: Temp: 97.9  F (36.6  C) Temp src: Oral BP: (!) 147/75 Pulse: 81   Resp: 16 SpO2: 98 % O2 Device: None (Room air)    Weight: 96 lbs 12.8 oz       Primary Care Physician   Carlos Kim    Discharge Orders      XR Thoracic Spine 2 Views     Follow-up and recommended labs and tests     Your Neurosurgical follow up appointments have been recommended in 6 weeks with XR prior. You may call [phone] to make, confirm or change your follow-up Neurosurgery appointment dates and/or times.     Activity    Your activity upon discharge: wear brace when out of bed, OK to have OFF when sitting, resting, hygiene as long as not at high risk of falling. Please limit your lifting to no more that ten pounds and avoid excessive bending, twisting and turning at the lumbar spine. You should also avoid excessive jostling and jarring activities.     Reason for your hospital stay     You presented for a fall were found to have a UTI and fracture in your back. While here you also required a dilation of your esophagus to help with swallowing.     Follow-up and recommended labs and tests     Follow up with primary care provider, Carlos Kim, within 7 days for hospital follow- up.  No follow up labs or test are needed.     Activity    Your activity upon discharge: activity as tolerated     Diet    Follow this diet upon discharge: Orders Placed This Encounter      Snacks/Supplements Adult: Ensure Enlive; Between Meals      Regular Diet Adult       Significant Results and Procedures   Results for orders placed or performed during the hospital encounter of 07/14/23   CT Head w/o Contrast    Narrative    CT OF THE HEAD WITHOUT CONTRAST  7/14/2023 11:46 AM     COMPARISON: Brain MR 8/21/2022.    HISTORY: Fall, on Eliquis.    TECHNIQUE: 5 mm thick axial CT images of the head were acquired  without IV contrast material.    FINDINGS:  There is mild diffuse cerebral volume loss. There are  subtle patchy areas of decreased density in the cerebral white matter  bilaterally that are consistent with sequela of chronic small vessel  ischemic disease.     The ventricles and basal cisterns are within normal limits in  configuration given the degree of cerebral volume loss.  There is no  midline shift. There are no extra-axial fluid collections.     No intracranial hemorrhage, mass or recent infarct.    The visualized paranasal sinuses are well-aerated. There is no  mastoiditis. There are no fractures of the visualized bones.       Impression    IMPRESSION:  Diffuse cerebral volume loss and cerebral white matter  changes consistent with chronic small vessel ischemic disease. No  evidence for acute intracranial pathology.       Radiation dose for this scan was reduced using automated exposure  control, adjustment of the mA and/or kV according to patient size, or  iterative reconstruction technique.    KAMERON  MD HERNAN         SYSTEM ID:  [id number]   CT Cervical Spine w/o Contrast    Narrative    CT OF THE CERVICAL SPINE WITHOUT CONTRAST   7/14/2023 11:48 AM     COMPARISON: None    HISTORY: Fall. Trauma. Pain.     TECHNIQUE: Axial images of the cervical spine were acquired without  intravenous contrast. Multiplanar reformations were created.        Impression    IMPRESSION: Minimal degenerative anterolisthesis of C4 upon C5 and C6  upon C7. Alignment of the cervical vertebrae is otherwise normal.  Vertebral body heights of the cervical spine are normal.  Craniocervical alignment is normal. There are no fractures of the  cervical spine.  No spinal canal stenosis. No prevertebral soft tissue  swelling.      Radiation dose for this scan was reduced using automated exposure  control, adjustment of the mA and/or kV according to patient size, or  iterative reconstruction technique    KAMERON BECERRA MD         SYSTEM ID:  [id number]   CT Chest Abdomen Pelvis w/o Contrast    Narrative    CT CHEST, ABDOMEN AND PELVIS WITHOUT CONTRAST  7/14/2023 11:49 AM    CLINICAL HISTORY: Fall, on Eliquis, upper and lower back pain in the  midline.    TECHNIQUE: CT scan of the chest, abdomen, and pelvis was performed  without IV contrast. Multiplanar reformats were obtained. Dose  reduction techniques were used.   CONTRAST: None.    COMPARISON: None.    FINDINGS:   LUNGS AND PLEURA: No effusions. No pneumothorax. Bilateral subpleural  scarring or atelectasis. Right upper lobe 2 mm nodule, series 3 image  73. Right middle lobe 3 mm nodule, series 5 image 188. Another 3 mm  right middle lobe nodule, image 193. There are other small pulmonary  nodules.    MEDIASTINUM/AXILLAE: No adenopathy. No acute mediastinal abnormality.    CORONARY ARTERY CALCIFICATION: Moderate.    HEPATOBILIARY: No acute hepatic abnormality. Gallbladder not seen.  Mild intrahepatic biliary ductal prominence.    PANCREAS: Normal.    SPLEEN: Normal.    ADRENAL GLANDS:  Normal.    KIDNEYS/BLADDER: No hydronephrosis or urolithiasis. No acute  abnormality.    BOWEL: No small bowel obstruction. Fluid and moderate stool in the  colon. No convincing evidence for appendicitis. A few colonic  diverticula.    LYMPH NODES: Normal.    VASCULATURE: Scattered vascular calcifications    PELVIC ORGANS: No acute abnormality.    OTHER: None.    MUSCULOSKELETAL: Compression deformities at T3, T12, and L2 vertebral  bodies are age indeterminate. Spine degenerative changes.  Anterolisthesis of L4 relative to L5.      Impression    IMPRESSION:  1.  Compression deformities at T3, T12, and L2 are age indeterminate.  2.  No other acute traumatic abnormality identified.  3.  A few indeterminate pulmonary nodules.   4.  Mild biliary ductal prominence. Correlate with any serum biliary  abnormality.    Recommendations for one or multiple incidental lung nodules < 6mm:    Low risk patients: No routine follow-up.    High risk patients: Optional follow-up CT at 12 months; if  unchanged, no further follow-up.    *Low Risk: Minimal or absent history of smoking or other known risk  factors.  *Nonsolid (ground glass) or partly solid nodules may require longer  follow-up to exclude indolent adenocarcinoma.  *Recommendations based on Guidelines for the Management of Incidental  Pulmonary Nodules Detected at CT: From the Fleischner Society 2017,  Radiology 2017.    AMOS KAY MD         SYSTEM ID:  [id number]   CT Lumbar Spine Reconstructed    Narrative    CT OF THE LUMBAR SPINE WITHOUT CONTRAST  7/14/2023 11:47 AM     COMPARISON: None.    HISTORY: Fall, on Eliquis, upper and lower back pain in the midline.     TECHNIQUE: Axial images of the lumbar spine were acquired without  intravenous contrast. Multiplanar reformations were created from the  axial source images.        Impression    IMPRESSION:  There is grade 1-2 degenerative anterolisthesis of L4  upon L5. There is grade 1 degenerative anterolisthesis of L5 upon  S1.  Alignment otherwise normal. There is a large bony defect in the  superior aspect of the L2 vertebral body that most likely represents a  large degenerative Schmorl's node deformity, although chronic fracture  cannot be completely excluded. Vertebral body heights and contours  otherwise normal. No recent fractures. Moderate to severe facet  arthropathy bilaterally at L3-L4, L4-L5 and L5-S1. Moderate  degenerative spinal canal stenosis at L4-L5. No other significant  spinal canal narrowing of the lumbar spine.      Radiation dose for this scan was reduced using automated exposure  control, adjustment of the mA and/or kV according to patient size, or  iterative reconstruction technique.    KAMERON BECERRA MD         SYSTEM ID:  [id number]   CT Thoracic Spine Reconstructed    Narrative    CT OF THE THORACIC SPINE WITHOUT CONTRAST   7/14/2023 11:46 AM     COMPARISON: None.    HISTORY: Fall. Trauma. Pain.     TECHNIQUE: Axial CT images of the thoracic spine were acquired without  intravenous contrast. Multiplanar reformations were created from the  axial source images.        Impression    IMPRESSION:  There is a recent-appearing moderate burst compression  fracture deformity of the T3 vertebral body with mild retropulsion of  bony fragments into the spinal canal. There is chronic-appearing  moderate compression fracture deformity of the T12 vertebral body.  Vertebral body heights and contours of the thoracic spine are  otherwise normal. No other recent fractures identified. No spinal  canal stenosis.      Radiation dose for this scan was reduced using automated exposure  control, adjustment of the mA and/or kV according to patient size, or  iterative reconstruction technique.    KAMERON BECERRA MD         SYSTEM ID:  [id number]   Thoracic spine MRI w/o contrast    Narrative    MRI OF THE THORACIC SPINE WITHOUT CONTRAST  7/14/2023 4:18 PM     COMPARISON: CT thoracic spine same day    HISTORY: T3 fracture with  retropulsion    TECHNIQUE: Multiplanar, multisequence MRI images of the thoracic spine  were acquired without gadolinium IV contrast.      Impression    IMPRESSION:   1. Normal alignment. Recent-appearing moderate burst compression  fracture deformity of the T3 vertebral body again noted.  Chronic-appearing fracture of the T12 vertebral body again noted.  There is a mixed signal intensity nodule in the T8 vertebral body that  could represent an atypical hemangioma. There is bone marrow edema in  the T3 vertebral body consistent with recent fracture. There is bone  marrow edema in the T2 vertebral body possibly due to altered  biomechanics at T2-T3. Marrow signal otherwise normal. No other  evidence for fracture or pathologic bony lesion.  2. No significant posterior disc bulges or herniations in the thoracic  spine.  3. Normal-appearing thoracic spinal cord.  4. No spinal canal stenosis.    KAMERON BECERRA MD         SYSTEM ID:  [id number]   US Abdomen Limited    Narrative    EXAM: US ABDOMEN LIMITED  LOCATION: [facility]  DATE: 7/17/2023    INDICATION: elevated enzymes, pain  COMPARISON: None.  TECHNIQUE: Limited abdominal ultrasound.    FINDINGS:    GALLBLADDER: Surgically removed.    BILE DUCTS: No biliary dilatation. The common duct measures 6 mm.    LIVER: Normal parenchyma with smooth contour. No focal mass.    RIGHT KIDNEY: No hydronephrosis.    PANCREAS: The visualized portions are normal.    No ascites.      Impression    IMPRESSION:  1.  Cholecystectomy.  2.  Liver within normal limits.   XR Esophagram    Narrative    ESOPHAGRAM DOUBLE CONTRAST 7/21/2023 9:21 AM     HISTORY: Dysphagia esophageal.  TECHNIQUE: 1 minute fluoroscopy utilized. 2 spot images.  COMPARISON: None    FINDINGS: Esophageal dysmotility with decreased secondary stripping  waves and multiple nonperistaltic tertiary waves present. There is  some stasis within the esophagus. No visible stricture, however a 13  mm  barium tablet was withheld at the GE junction and does not pass  into the stomach.      Impression    IMPRESSION:  1.  Esophageal dysmotility/presbyesophagus.  2.  No visible strictures with the barium however a 13 mm tablet was  withheld at the GE junction. Findings indeterminant. Follow-up  endoscopy could be considered as clinically warranted.    MARYANN MARIA MD         SYSTEM ID:  [id number]   XR Abdomen 2 Views    Narrative    EXAM: XR ABDOMEN 2 VIEWS  LOCATION: [facility]  DATE: 7/21/2023    INDICATION: Abdominal distention.    COMPARISON: None.      Impression    IMPRESSION: No definite free air on lateral decubitus views. Some air-filled colon loops are noted, no dilated small bowel to suggest small bowel obstruction.     XR Chest Port 1 View    Narrative    EXAM: XR CHEST PORT 1 VIEW  LOCATION: [facility]  DATE: 7/21/2023    INDICATION: chest pain  COMPARISON: None.      Impression    IMPRESSION: Negative chest.       Discharge Medications   Current Discharge Medication List        START taking these medications    Details   Lidocaine (LIDOCARE) 4 % Patch Place 1 patch onto the skin every 24 hours To prevent lidocaine toxicity, patient should be patch free for 12 hrs daily.  Qty: 30 patch, Refills: 0    Associated Diagnoses: Closed fracture of third thoracic vertebra, unspecified fracture morphology, initial encounter (H)      oxyCODONE (ROXICODONE) 5 MG tablet Take 0.5-1 tablets (2.5-5 mg) by mouth every 6 hours as needed for breakthrough pain  Qty: 7 tablet, Refills: 0    Associated Diagnoses: Closed fracture of third thoracic vertebra, unspecified fracture morphology, initial encounter (H)      pantoprazole (PROTONIX) 40 MG EC tablet Take 1 tablet (40 mg) by mouth 2 times daily for 30 days  Qty: 60 tablet, Refills: 0    Associated Diagnoses: Closed fracture of third thoracic vertebra, unspecified fracture morphology, initial encounter (H)            CONTINUE these medications which have NOT CHANGED    Details   apixaban ANTICOAGULANT (ELIQUIS) 2.5 MG tablet Take 2.5 mg by mouth 2 times daily      atorvastatin (LIPITOR) 20 MG tablet Take 20 mg by mouth daily      calcium carbonate 600 mg-vitamin D 400 units (CALTRATE) 600-400 MG-UNIT per tablet Take 1 tablet by mouth 2 times daily      cilostazol (PLETAL) 100 MG tablet Take 100 mg by mouth 2 times daily      FLUoxetine (PROZAC) 20 MG capsule Take 40 mg by mouth every evening      gabapentin (NEURONTIN) 100 MG capsule Take 300 mg by mouth At Bedtime      levothyroxine (SYNTHROID/LEVOTHROID) 50 MCG tablet Take 50 mcg by mouth every evening      multivitamin w/minerals (THERA-VIT-M) tablet Take 1 tablet by mouth daily      phenazopyridine (AZO) 97.5 MG tablet Take 195 mg by mouth 3 times daily as needed for urinary tract discomfort      polyethylene glycol-propylene glycol PF (SYSTANE ULTRA PF) 0.4-0.3 % SOLN opthalmic solution Place 1 drop into both eyes 4 times daily as needed for dry eyes      vitamin D3 (CHOLECALCIFEROL) 50 mcg (2000 units) tablet Take 1 tablet by mouth every evening           Allergies   Allergies   Allergen Reactions    Sodium Hypochlorite Shortness Of Breath    Alendronate Diarrhea    Raloxifene Other (See Comments)     RIND on 11/8/2019    Sulfa Antibiotics Rash    Sulfasalazine Rash

## 2023-07-25 NOTE — PROGRESS NOTES
"    Gastroenterology Follow Up Note    Leesa Jacinto MRN#  [id number]   Age:  83 year old YOB: 1940    Date of Admission:  7/14/2023     Subjective   No acute events overnight.  Reports swallowing has improved significantly following endoscopic dilation yesterday.  Tolerating diet and pills without any difficulties.  Denies any abdominal pain, nausea, or emesis.  Afebrile and hemodynamically stable.     MEDICATIONS & ALLERGIES   Current medication list reviewed.      Allergies   Allergen Reactions    Sodium Hypochlorite Shortness Of Breath    Alendronate Diarrhea    Raloxifene Other (See Comments)     RIND on 11/8/2019    Sulfa Antibiotics Rash    Sulfasalazine Rash          OBJECTIVE   Vitals BP (!) 147/75 (BP Location: Right arm)   Pulse 81   Temp 97.9  F (36.6  C) (Oral)   Resp 16   Ht 1.499 m (4' 11\")   Wt 43.9 kg (96 lb 12.8 oz)   SpO2 98%   BMI 19.55 kg/m          General:   Well-developed elderly CF in NAD.   HEENT:   NC/AT. PERRL. MMM.   Lungs:  No respiratory distress.   Heart:  RRR. +S1, S2.    Abdomen:   Soft. NT/ND. BS active.    Ext/MS:   No cyanosis or erythema.    Neuro:   A&O x3. No focal deficits noted.    Skin:  No obvious rashes or lesions noted.         LABORATORY AND IMAGING    ELECTROLYTE PANEL   Recent Labs   Lab Test 07/24/23  0804 07/22/23  1202 07/18/23  0647   POTASSIUM 3.6 3.4 3.5   BUN 7.2* 11.7 19.9      HEMATOLOGY PANEL   Recent Labs   Lab Test 07/22/23  1202 07/21/23  1747 07/18/23  0647 08/22/22  0722 08/21/22  1929   WBC 6.3 7.1 4.5   < > 6.6   MCV 94 94 94   < > 91   INR  --   --   --   --  1.12    < > = values in this interval not displayed.      LIVER AND PANCREAS PANEL   Recent Labs   Lab Test 07/17/23  1243 07/15/23  0724 07/14/23  1051   ALBUMIN 3.7 3.5 3.9      I have reviewed the current diagnostic and laboratory tests.     Assessment / Recommendations:     Assessment:  Dysphagia.  EGD on 07/24 notable for benign appearing distal esophageal " stricture / Schatzki's ring with endoscopic balloon dilation to 18 mm.  Clinical improvement after endoscopic interventions.  Suspect peptic stricture.  Low suspicion for underlying primary dysmotility, but cannot be completely excluded.  No endoscopic evidence of malignant obstructive lesions.  Chronic anticoagulation with Eliquis.    Recommendations:  Continue Pantoprazole 40 mg once daily prior to breakfast on discharge.  Soft consistency diet.  Dysphagia precautions: sit upright while eating, slow pace of eating, smaller but frequent meals, do not lounge or lie down soon after meals, identify and avoid any trigger foods, avoid late night meals, elevate head of the bed if needed.  Ok to resume Eliquis as clinically indicated.  Ok to discharge from GI standpoint.  Will sign off.  Please feel free with any questions or concerns.       Total time spent in chart review, direct medical discussion, examination, and documentation was 20 minutes.    Ayad Figueroa MD  [facility]  [phone]  I appreciate the opportunity to participate in the care of this patient.   Please feel free to call me with any questions or concerns.

## 2023-07-26 LAB
PATH REPORT.ADDENDUM SPEC: NORMAL
PATH REPORT.COMMENTS IMP SPEC: NORMAL
PATH REPORT.COMMENTS IMP SPEC: NORMAL
PATH REPORT.FINAL DX SPEC: NORMAL
PATH REPORT.GROSS SPEC: NORMAL
PATH REPORT.MICROSCOPIC SPEC OTHER STN: NORMAL
PATH REPORT.RELEVANT HX SPEC: NORMAL
PHOTO IMAGE: NORMAL

## 2023-09-08 ENCOUNTER — TELEPHONE (OUTPATIENT)
Dept: NEUROSURGERY | Facility: CLINIC | Age: 83
End: 2023-09-08
Payer: COMMERCIAL

## 2023-09-08 NOTE — TELEPHONE ENCOUNTER
Writer called and spoke to patient to advise she will need to have her follow up care within her insurance network as [facility] does not accept [employer]. Patient was in understanding and will reach out to her provider. Appointments have been cancelled.